# Patient Record
Sex: FEMALE | Race: WHITE | Employment: OTHER | URBAN - METROPOLITAN AREA
[De-identification: names, ages, dates, MRNs, and addresses within clinical notes are randomized per-mention and may not be internally consistent; named-entity substitution may affect disease eponyms.]

---

## 2018-11-23 ENCOUNTER — HOSPITAL ENCOUNTER (INPATIENT)
Facility: HOSPITAL | Age: 63
LOS: 11 days | Discharge: HOME | DRG: 871 | End: 2018-12-04
Attending: EMERGENCY MEDICINE | Admitting: INTERNAL MEDICINE
Payer: MEDICARE

## 2018-11-23 ENCOUNTER — APPOINTMENT (OUTPATIENT)
Dept: GENERAL RADIOLOGY | Facility: HOSPITAL | Age: 63
DRG: 871 | End: 2018-11-23
Attending: EMERGENCY MEDICINE
Payer: MEDICARE

## 2018-11-23 DIAGNOSIS — N39.0 SEPSIS DUE TO URINARY TRACT INFECTION (HCC): Primary | ICD-10-CM

## 2018-11-23 DIAGNOSIS — E87.5 HYPERKALEMIA: ICD-10-CM

## 2018-11-23 DIAGNOSIS — G93.41 ENCEPHALOPATHY IN SEPSIS: ICD-10-CM

## 2018-11-23 DIAGNOSIS — A41.9 SEPSIS DUE TO URINARY TRACT INFECTION (HCC): Primary | ICD-10-CM

## 2018-11-23 PROBLEM — E87.2 METABOLIC ACIDOSIS: Status: ACTIVE | Noted: 2018-11-23

## 2018-11-23 PROBLEM — R79.89 AZOTEMIA: Status: ACTIVE | Noted: 2018-11-23

## 2018-11-23 PROBLEM — G93.49 ENCEPHALOPATHY DUE TO INFECTION: Status: ACTIVE | Noted: 2018-11-23

## 2018-11-23 PROBLEM — D64.9 ANEMIA: Status: ACTIVE | Noted: 2018-11-23

## 2018-11-23 PROBLEM — D72.829 LEUKOCYTOSIS: Status: ACTIVE | Noted: 2018-11-23

## 2018-11-23 PROBLEM — R73.9 HYPERGLYCEMIA: Status: ACTIVE | Noted: 2018-11-23

## 2018-11-23 PROBLEM — B99.9 ENCEPHALOPATHY DUE TO INFECTION: Status: ACTIVE | Noted: 2018-11-23

## 2018-11-23 PROCEDURE — 71045 X-RAY EXAM CHEST 1 VIEW: CPT | Performed by: EMERGENCY MEDICINE

## 2018-11-23 PROCEDURE — 99291 CRITICAL CARE FIRST HOUR: CPT | Performed by: INTERNAL MEDICINE

## 2018-11-23 RX ORDER — AMMONIUM LACTATE 12 G/100G
LOTION TOPICAL AS NEEDED
Status: ON HOLD | COMMUNITY
End: 2018-12-04

## 2018-11-23 RX ORDER — HYDROCODONE BITARTRATE AND ACETAMINOPHEN 10; 325 MG/1; MG/1
1 TABLET ORAL EVERY 6 HOURS PRN
Status: ON HOLD | COMMUNITY
End: 2018-12-04

## 2018-11-23 RX ORDER — DEXTROSE MONOHYDRATE 25 G/50ML
50 INJECTION, SOLUTION INTRAVENOUS ONCE
Status: COMPLETED | OUTPATIENT
Start: 2018-11-23 | End: 2018-11-23

## 2018-11-23 RX ORDER — HYDROMORPHONE HYDROCHLORIDE 1 MG/ML
0.5 INJECTION, SOLUTION INTRAMUSCULAR; INTRAVENOUS; SUBCUTANEOUS EVERY 30 MIN PRN
Status: DISCONTINUED | OUTPATIENT
Start: 2018-11-23 | End: 2018-11-23

## 2018-11-23 RX ORDER — HEPARIN SODIUM 5000 [USP'U]/ML
5000 INJECTION, SOLUTION INTRAVENOUS; SUBCUTANEOUS EVERY 8 HOURS SCHEDULED
Status: DISCONTINUED | OUTPATIENT
Start: 2018-11-23 | End: 2018-12-04

## 2018-11-23 RX ORDER — LABETALOL 200 MG/1
200 TABLET, FILM COATED ORAL 2 TIMES DAILY
COMMUNITY

## 2018-11-23 RX ORDER — PIMECROLIMUS 10 MG/G
CREAM TOPICAL 2 TIMES DAILY
Status: ON HOLD | COMMUNITY
End: 2018-12-04

## 2018-11-23 RX ORDER — BACLOFEN 20 MG/1
20 TABLET ORAL 3 TIMES DAILY
Status: ON HOLD | COMMUNITY
End: 2018-12-04

## 2018-11-23 RX ORDER — DOCUSATE SODIUM 100 MG/1
100 CAPSULE, LIQUID FILLED ORAL 2 TIMES DAILY
COMMUNITY

## 2018-11-23 RX ORDER — CITALOPRAM 40 MG/1
40 TABLET ORAL DAILY
Status: ON HOLD | COMMUNITY
End: 2019-08-07

## 2018-11-23 RX ORDER — DEXTROSE MONOHYDRATE 25 G/50ML
50 INJECTION, SOLUTION INTRAVENOUS
Status: DISCONTINUED | OUTPATIENT
Start: 2018-11-23 | End: 2018-12-04

## 2018-11-23 RX ORDER — BISACODYL 10 MG
10 SUPPOSITORY, RECTAL RECTAL DAILY PRN
COMMUNITY

## 2018-11-23 RX ORDER — MULTIVITAMIN
TABLET ORAL
Status: ON HOLD | COMMUNITY
End: 2018-12-04

## 2018-11-23 RX ORDER — ONDANSETRON 2 MG/ML
4 INJECTION INTRAMUSCULAR; INTRAVENOUS EVERY 6 HOURS PRN
Status: DISCONTINUED | OUTPATIENT
Start: 2018-11-23 | End: 2018-12-04

## 2018-11-23 RX ORDER — ACETAMINOPHEN 325 MG/1
650 TABLET ORAL EVERY 6 HOURS PRN
COMMUNITY

## 2018-11-23 RX ORDER — NYSTATIN 10B UNIT
POWDER (EA) MISCELLANEOUS
Status: ON HOLD | COMMUNITY
End: 2018-11-24

## 2018-11-23 RX ORDER — SODIUM BICARBONATE 650 MG/1
650 TABLET ORAL 3 TIMES DAILY
Status: ON HOLD | COMMUNITY
End: 2018-12-04

## 2018-11-23 RX ORDER — LEVETIRACETAM 500 MG/1
500 TABLET ORAL 2 TIMES DAILY
Status: ON HOLD | COMMUNITY
End: 2018-12-12

## 2018-11-23 RX ORDER — POLYETHYLENE GLYCOL 3350 17 G/17G
17 POWDER, FOR SOLUTION ORAL DAILY
COMMUNITY
End: 2019-06-02 | Stop reason: ALTCHOICE

## 2018-11-23 RX ORDER — SODIUM POLYSTYRENE SULFONATE 15 G/60ML
15 SUSPENSION ORAL; RECTAL ONCE
Status: DISCONTINUED | OUTPATIENT
Start: 2018-11-23 | End: 2018-12-02

## 2018-11-23 RX ORDER — CALCIUM ACETATE 667 MG/1
1 CAPSULE ORAL 2 TIMES DAILY
Status: ON HOLD | COMMUNITY
End: 2018-12-04

## 2018-11-23 RX ORDER — SODIUM CHLORIDE 9 MG/ML
INJECTION, SOLUTION INTRAVENOUS CONTINUOUS
Status: DISCONTINUED | OUTPATIENT
Start: 2018-11-23 | End: 2018-11-25

## 2018-11-23 RX ORDER — ONDANSETRON 2 MG/ML
4 INJECTION INTRAMUSCULAR; INTRAVENOUS EVERY 4 HOURS PRN
Status: DISCONTINUED | OUTPATIENT
Start: 2018-11-23 | End: 2018-11-29

## 2018-11-23 RX ORDER — SODIUM CHLORIDE 9 MG/ML
INJECTION, SOLUTION INTRAVENOUS CONTINUOUS
Status: DISCONTINUED | OUTPATIENT
Start: 2018-11-23 | End: 2018-11-23

## 2018-11-23 RX ORDER — SODIUM POLYSTYRENE SULFONATE 15 G/60ML
15 SUSPENSION ORAL; RECTAL ONCE
Status: COMPLETED | OUTPATIENT
Start: 2018-11-23 | End: 2018-11-23

## 2018-11-23 RX ORDER — GABAPENTIN 300 MG/1
300 CAPSULE ORAL DAILY
Status: ON HOLD | COMMUNITY
End: 2018-12-04

## 2018-11-23 RX ORDER — GLIPIZIDE 5 MG/1
5 TABLET ORAL
Status: ON HOLD | COMMUNITY
End: 2019-08-07

## 2018-11-23 RX ORDER — ACETAMINOPHEN 325 MG/1
650 TABLET ORAL EVERY 6 HOURS PRN
Status: DISCONTINUED | OUTPATIENT
Start: 2018-11-23 | End: 2018-12-04

## 2018-11-23 RX ORDER — AMLODIPINE BESYLATE 5 MG/1
5 TABLET ORAL DAILY
Status: ON HOLD | COMMUNITY
End: 2018-12-04

## 2018-11-24 ENCOUNTER — APPOINTMENT (OUTPATIENT)
Dept: CT IMAGING | Facility: HOSPITAL | Age: 63
DRG: 871 | End: 2018-11-24
Attending: INTERNAL MEDICINE
Payer: MEDICARE

## 2018-11-24 ENCOUNTER — APPOINTMENT (OUTPATIENT)
Dept: ULTRASOUND IMAGING | Facility: HOSPITAL | Age: 63
DRG: 871 | End: 2018-11-24
Attending: INTERNAL MEDICINE
Payer: MEDICARE

## 2018-11-24 PROBLEM — E11.9 DIABETES (HCC): Chronic | Status: ACTIVE | Noted: 2018-11-24

## 2018-11-24 PROCEDURE — 99223 1ST HOSP IP/OBS HIGH 75: CPT | Performed by: INTERNAL MEDICINE

## 2018-11-24 PROCEDURE — 99223 1ST HOSP IP/OBS HIGH 75: CPT | Performed by: OTHER

## 2018-11-24 PROCEDURE — 99232 SBSQ HOSP IP/OBS MODERATE 35: CPT | Performed by: INTERNAL MEDICINE

## 2018-11-24 PROCEDURE — 70450 CT HEAD/BRAIN W/O DYE: CPT | Performed by: INTERNAL MEDICINE

## 2018-11-24 PROCEDURE — 99291 CRITICAL CARE FIRST HOUR: CPT | Performed by: NURSE PRACTITIONER

## 2018-11-24 RX ORDER — METOPROLOL TARTRATE 5 MG/5ML
5 INJECTION INTRAVENOUS EVERY 6 HOURS PRN
Status: DISCONTINUED | OUTPATIENT
Start: 2018-11-24 | End: 2018-12-04

## 2018-11-24 RX ORDER — NICOTINE 21 MG/24HR
1 PATCH, TRANSDERMAL 24 HOURS TRANSDERMAL DAILY
Status: DISCONTINUED | OUTPATIENT
Start: 2018-11-24 | End: 2018-12-02

## 2018-11-24 RX ORDER — NYSTATIN 100000 [USP'U]/G
POWDER TOPICAL 2 TIMES DAILY
Status: ON HOLD | COMMUNITY
End: 2018-12-04

## 2018-11-24 NOTE — PLAN OF CARE
Patient transferred from ICU in stable condition. Patient awake +alert and oriented x1; asking to go to floor 1 for a cigarette. Resting comfortably in bed. Chatman draining yellow urine. Redness noted around jerald area.  Wet cough noted; patient unable to harper

## 2018-11-24 NOTE — PLAN OF CARE
Received patient from ED. Drowsy/lethargic. Slowly improving. See flowsheet for further assessment. Taken for CT brain (-). Received on 3L NC. Weaning O2 as tolerated. SR. VSS.  NPO. Chronic greene. Wound care to see.   Bedrest.  PT and OT consulted

## 2018-11-24 NOTE — ED INITIAL ASSESSMENT (HPI)
Patient presents from [address] [place] [address] for evaluation of altered mental status. According to EMS the nursing facility stated that she is normally alert and oriented but has declined today. Fever of 102.8 for EMS.

## 2018-11-24 NOTE — H&P
[facility] H[facility]PITAL[facility]                                                               History & Physical         Homero Flores Patient Status:  Emergency    10/30/1955 ZOHRA [id number]   Location [facility] Attending Maria G Lewis reports that she has been smoking. She does not have any smokeless tobacco history on file. She reports that she does not drink alcohol or use drugs.     Allergies:    Lisinopril              UNKNOWN    Home Medications:    (Not in a hospital admission) Normal            Diagnostic Data:      Laboratory Data:   Lab Results   Component Value Date    WBC 17.6 11/23/2018    HGB 9.7 11/23/2018    HCT 30.6 11/23/2018    .0 11/23/2018    CREATSERUM 2.07 11/23/2018    BUN 50 11/23/2018     11/23/201 admission. 5. Seizure disorder–we will give IV Keppra while n.p.o.  6. Depression and anxiety  7. History of cervical disc disease with myelopathy with previous surgery  8. Hyperkalemia–status post Kayexalate. Follow BMP  9.  Acute kidney injury on chroni

## 2018-11-24 NOTE — CONSULTS
[facility]  Report of Consultation    Jayde Galeana Patient Status:  Inpatient    10/30/1955 MRN [id number]   [facility] ([place], [place]) 4SW-A Attending Socorro Selby MD   Hosp Day # 1 PCP [facility]     Reason for Consultation:  AHSAN vs CKD I Polystyrene Sulfonate (KAYEXALATE) 15 GM/60ML suspension 15 g, 15 g, Oral, Once  •  glucose (DEX4) oral liquid 15 g, 15 g, Oral, Q15 Min PRN **OR** Glucose-Vitamin C (DEX-4) 4-6 GM-MG chewable tab 4 tablet, 4 tablet, Oral, Q15 Min PRN **OR** dextrose 50 % 11/24/2018    ALB 2.3 11/23/2018    ALKPHO 127 11/23/2018    BILT 0.2 11/23/2018    TP 7.5 11/23/2018    AST 11 11/23/2018    ALT 20 11/23/2018    PTT 34.3 11/23/2018    INR 1.16 11/23/2018    PTP 15.3 11/23/2018    MG 2.0 11/24/2018    PGLU 152 11/24/2018

## 2018-11-24 NOTE — PLAN OF CARE
NEUROLOGICAL - ADULT    • Achieves stable or improved neurological status Progressing    • Absence of seizures Progressing    • Remains free of injury related to seizure activity Progressing        Received patient this am awake and alert.  Oriented to pers

## 2018-11-24 NOTE — PROGRESS NOTES
ICU  Critical Care APRN H & P    NAME: Dhara Duarte - ROOM: [address] [place] [address] - MRN: [id number] - Age: 66year old - :10/30/1955    History Of Present Illness:  Dhara Duarte is a 66year old female with PMHx significant for hypertension, diabetes, Skin: Skin color, texture, turgor normal for ethnicity, no rashes or lesions, warm and dry, refer to images- multiple areas of concern   Neurologic: CNII-XII intact, normal strength    Data this admission:  Xr Chest Ap Portable  (cpt=71045)    Result Date:

## 2018-11-24 NOTE — CONSULTS
[facility]    Report of Consultation    Abeba Citizen Patient Status:  Inpatient    10/30/1955 MRN [id number]   [facility] 3NW-A Attending Jones Quezada MD   Hosp Day # 1 [facility] [address] [facility]     Date of Admission:  2018  Da (NICODERM CQ) 14 MG/24HR 1 patch, 1 patch, Transdermal, Daily  •  lactated ringers IV bolus 2,313 mL, 30 mL/kg, Intravenous, Continuous  •  0.9%  NaCl infusion, , Intravenous, Continuous  •  Heparin Sodium (Porcine) 5000 UNIT/ML injection 5,000 Units, 5,00 Face is symmetrical.  No Drift. Pupils equally round and reactive to light. 2+ reflexes bilaterally. EOMs intact. Visual fields are full. Tongue is midline. Uvula and palate elevate symmetrically. Shrug shoulders normally bilaterally.   The rest of t stroke, and if she does not continue to further improve, an EEG. Baseline cognition is not known, and her seizure history is unclear. Will attempt to get records. I would continue her home dose Keppra.      Thank you for allowing me to participate in the ev

## 2018-11-24 NOTE — CONSULTS
Pulmonary Critical Care Consult       NAME: [address] [place] [address] Se: [id number] - MRN: [id number] - Age: 66year old - :  10/30/1955    Date of Admission: 2018  8:08 PM  Admission Diagnosis: Hyperkalemia [E87.5]  Encephalopathy in sepsis [G93.41] 11/23/2018 at Unknown time   Pimecrolimus (ELIDEL) 1 % External Cream Apply topically 2 (two) times daily. Disp:  Rfl:  11/23/2018 at Unknown time   gabapentin 300 MG Oral Cap Take 300 mg by mouth daily.    Disp:  Rfl:  11/23/2018 at Unknown time   glipiZID Alcohol use: No        Frequency: Never      Drug use: No      Sexual activity: Not on file    Other Topics      Concerns:        Not on file    Social History Narrative      Not on file    Family History   Family history unknown: Yes   patient cannot reca cooperative, no distress, appears stated age. Head:  Normocephalic, without obvious abnormality, atraumatic. Eyes:  Conjunctivae/corneas clear. Neck: Supple   Back:   Non tender   Lungs:   Clear to auscultation bilaterally.    Chest wall:  No tenderne Collection Time: 11/23/18  8:43 PM   Result Value Ref Range    Urine Color Yellow Yellow    Clarity Urine Cloudy (A) Clear    Spec Gravity 1.015 1.001 - 1.030    Glucose Urine 50  (A) Negative mg/dl    Bilirubin Urine Negative Negative    Ketones Urine Neg Immature Granulocyte % 1.0 %   RAINBOW DRAW GOLD    Collection Time: 11/23/18 11:20 PM   Result Value Ref Range    Hold Gold Auto Resulted    PROTHROMBIN TIME (PT)    Collection Time: 11/23/18 11:21 PM   Result Value Ref Range    PT 15.3 (H) 12.4 - 14.7 se Absolute 0.00 0.00 - 0.30 x10(3) uL    Basophil Absolute 0.02 0.00 - 0.10 x10(3) uL    Immature Granulocyte Absolute 0.12 0.00 - 1.00 x10(3) uL    Neutrophil % 83.9 %    Lymphocyte % 8.6 %    Monocyte % 6.7 %    Eosinophil % 0.0 %    Basophil % 0.1 %    Im

## 2018-11-24 NOTE — PROGRESS NOTES
[facility]  Progress Note     Dhara Duarte Patient Status:  Inpatient    10/30/1955 MRN [id number]   [facility] 4SW-A Attending Saida Larson MD   Hosp Day # 1 PCP [facility]     Chief Complaint: AMS    S: Patient overall Subcutaneous Q8H [address]   • piperacillin-tazobactam  3.375 g Intravenous Q8H   • levETIRAcetam  500 mg Intravenous Q12H   • Sodium Polystyrene Sulfonate  15 g Oral Once   • Insulin Aspart Pen  1-10 Units Subcutaneous TID AC and HS       ASSESSMENT / PLAN:

## 2018-11-24 NOTE — PHYSICAL THERAPY NOTE
PHYSICAL THERAPY QUICK EVALUATION - INPATIENT    Room Number: 466/466-A  Evaluation Date: 11/24/2018  Presenting Problem: Sepsis, UTI  Physician Order: PT Eval and Treat    Problem List  Principal Problem:    Sepsis due to [facility] tract infection [facility] Turning over in bed (including adjusting bedclothes, sheets and blankets)?: A Lot   -   Sitting down on and standing up from a chair with arms (e.g., wheelchair, bedside commode, etc.): Unable   -   Moving from lying on back to sitting on the side of the b services. Please re-order if a new functional limitation presents during this admission. GOALS  Patient was able to achieve the following goals . ..     Patient was able to transfer Total assist wit lift use   Patient able to ambulate on level surfaces U

## 2018-11-24 NOTE — ED NOTES
Patient had large formed and liquid bowel movement after rectal kaexelate. Brief changed and jerald-care performed.

## 2018-11-24 NOTE — ED PROVIDER NOTES
Patient Seen in: [facility] Emergency Department    History   Patient presents with:  Fever (infectious)  Altered Mental Status (neurologic)    Stated Complaint: fever, ams    HPI    51-year-old female presents to the emergency department via EMS du conjunctival pallor: Neck is supple without tenderness on palpation. Head is atraumatic normocephalic. Oral mucosa is dry. Tongue is midline. No posterior pharyngeal lesions. Lungs: Rales are noted in the lung bases bilaterally.   Rhonchi are also no panel order CBC WITH DIFFERENTIAL WITH PLATELET.   Procedure                               Abnormality         Status                     ---------                               -----------         ------                     CBC W/ DIFFERENTIAL[[id number]] PM          Disposition and Plan     Clinical Impression:  Sepsis due to urinary tract infection ([facility]  (primary encounter diagnosis)  Hyperkalemia  Encephalopathy in sepsis    Disposition:  Admit  11/23/2018  9:58 pm    Follow-up:  No follow-up provider s

## 2018-11-25 ENCOUNTER — APPOINTMENT (OUTPATIENT)
Dept: ULTRASOUND IMAGING | Facility: HOSPITAL | Age: 63
DRG: 871 | End: 2018-11-25
Attending: INTERNAL MEDICINE
Payer: MEDICARE

## 2018-11-25 ENCOUNTER — APPOINTMENT (OUTPATIENT)
Dept: CT IMAGING | Facility: HOSPITAL | Age: 63
DRG: 871 | End: 2018-11-25
Attending: Other
Payer: MEDICARE

## 2018-11-25 PROCEDURE — 99232 SBSQ HOSP IP/OBS MODERATE 35: CPT | Performed by: INTERNAL MEDICINE

## 2018-11-25 PROCEDURE — 70450 CT HEAD/BRAIN W/O DYE: CPT | Performed by: OTHER

## 2018-11-25 PROCEDURE — 76770 US EXAM ABDO BACK WALL COMP: CPT | Performed by: INTERNAL MEDICINE

## 2018-11-25 RX ORDER — HYDRALAZINE HYDROCHLORIDE 20 MG/ML
10 INJECTION INTRAMUSCULAR; INTRAVENOUS
Status: DISCONTINUED | OUTPATIENT
Start: 2018-11-25 | End: 2018-12-04

## 2018-11-25 NOTE — CONSULTS
[facility]    PATIENT'S NAME: Hector Obrien   ATTENDING PHYSICIAN: RAJESH Bautista: Neyda Jin M.D.    PATIENT ACCOUNT#:   [de-identified]    LOCATION:  [address] [place] [address] [facility]  MEDICAL RECORD #:   [id number]       DATE OF MURIEL axilla being basically not involved. However, the classic pock shira signs of hidradenitis are definitely present in the groins, and there are many scarred areas and hypertrophic tissue as well. I cannot rule out that there is infection there.       Alexey Greco

## 2018-11-25 NOTE — PROGRESS NOTES
[id number] [facility] Neurology Progress Note    Abeba Citisandie Patient Status:  Inpatient    10/30/1955 MRN [id number]   [facility] 3NW-A Attending Jones Quezada MD   Hosp Day # 2 PCP [facility]         Subjective:  Alistair Dorado Imaging/Diagnostic:    • insulin detemir  10 Units Subcutaneous Nightly   • nicotine  1 patch Transdermal Daily   • nystatin-triamcinolone   Topical BID   • Heparin Sodium (Porcine)  5,000 Units Subcutaneous Q8H [address]   • piperacillin-tazobactam  3.375 g (36.7 °C) (Oral)   Resp 16   Ht 65\"   Wt 178 lb 5.6 oz   SpO2 99%   BMI 29.68 kg/m²   Neuro exam pertinent for oriented to person and hospital and year, but still tangential, not able to answer questions about her history, EOMI, face symmetric, LUE 4/5, R

## 2018-11-25 NOTE — PROGRESS NOTES
[facility]  Nephrology Progress Note    Mohini Askewrhona Patient Status:  Inpatient    10/30/1955 MRN [id number]   [facility] 3NW-A Attending Loree Carrillo MD   Hosp Day # 2 PCP [facility]       SUBJECTIVE:  No acute events noted Current Facility-Administered Medications:  hydrALAzine HCl (APRESOLINE) injection 10 mg 10 mg Intravenous Q3H PRN   metoprolol Tartrate (LOPRESSOR) 5 MG/5ML injection 5 mg 5 mg Intravenous Q6H PRN   insulin detemir (LEVEMIR) 100 UNIT/ML flextouch 10 U with mild incr creat on admit. Remains stable today     #3. AMS/fever- appears to have mult infections incl UTI and cellulitis with pseudomonas bacteremia. Cont abx per ID.     #4. AMS- remains somewhat confused.   Neuro eval ongoing          Rajan Esqueda

## 2018-11-25 NOTE — H&P
Id consult  #[id number]  Probably uti with bacteremia, gnr  It is possible there is sepsis related to her extensive hidradenitis process.

## 2018-11-25 NOTE — PROGRESS NOTES
[facility]  Progress Note     Jayde Galeana Patient Status:  Inpatient    10/30/1955 MRN [id number]   [facility] 3NW-A Attending Socorro Selby MD   Hosp Day # 2 PCP [facility]     Chief Complaint: sepsis   S: Patient with n Nightly   • nicotine  1 patch Transdermal Daily   • nystatin-triamcinolone   Topical BID   • Heparin Sodium (Porcine)  5,000 Units Subcutaneous Q8H [address]   • piperacillin-tazobactam  3.375 g Intravenous Q8H   • levETIRAcetam  500 mg Intravenous Q12H   • Sodi

## 2018-11-26 ENCOUNTER — APPOINTMENT (OUTPATIENT)
Dept: CT IMAGING | Facility: HOSPITAL | Age: 63
DRG: 871 | End: 2018-11-26
Attending: INTERNAL MEDICINE
Payer: MEDICARE

## 2018-11-26 ENCOUNTER — APPOINTMENT (OUTPATIENT)
Dept: CV DIAGNOSTICS | Facility: HOSPITAL | Age: 63
DRG: 871 | End: 2018-11-26
Attending: INTERNAL MEDICINE
Payer: MEDICARE

## 2018-11-26 PROCEDURE — 93306 TTE W/DOPPLER COMPLETE: CPT | Performed by: INTERNAL MEDICINE

## 2018-11-26 PROCEDURE — 90792 PSYCH DIAG EVAL W/MED SRVCS: CPT | Performed by: OTHER

## 2018-11-26 PROCEDURE — 99232 SBSQ HOSP IP/OBS MODERATE 35: CPT | Performed by: INTERNAL MEDICINE

## 2018-11-26 PROCEDURE — 74176 CT ABD & PELVIS W/O CONTRAST: CPT | Performed by: INTERNAL MEDICINE

## 2018-11-26 PROCEDURE — 99233 SBSQ HOSP IP/OBS HIGH 50: CPT | Performed by: OTHER

## 2018-11-26 RX ORDER — LABETALOL 200 MG/1
200 TABLET, FILM COATED ORAL
Status: DISCONTINUED | OUTPATIENT
Start: 2018-11-26 | End: 2018-12-04

## 2018-11-26 RX ORDER — AMLODIPINE BESYLATE 5 MG/1
5 TABLET ORAL DAILY
Status: DISCONTINUED | OUTPATIENT
Start: 2018-11-26 | End: 2018-12-03

## 2018-11-26 NOTE — CONSULTS
[facility]  Report of Psychiatric Consultation    Lucia Bernal Patient Status:  Inpatient    10/30/1955 MRN [id number]   [facility] 3NW-A Attending Ja Penny MD   Hosp Day # 3 PCP [facility]     Date of Admission:  65 yo WF with recurrent major depressive disorder and cervical myelopathy was admitted on 11/23/18 for eval of her fever and lethargy. She was found to have sepsis with pseudomonas (positive blood cultures) from a suspected UTI.  Her sensorium has improved Lisinopril              UNKNOWN    Medications:    Current Facility-Administered Medications:   •  AmLODIPine Besylate (NORVASC) tab 5 mg, 5 mg, Oral, Daily  •  Labetalol HCl (NORMODYNE) tab 200 mg, 200 mg, Oral, 2x Daily(Beta Blocker)  •  Cefepime HCl (MA Suicidal ideation: no suicidal ideation  Homicidal ideation: None noted    Objective       11/26/18  1537   BP: (!) 171/61   Pulse: 78   Resp: 18   Temp: 97.9 °F (36.6 °C)     Appearance: fair grooming  Behavior: normal psychomotor  Attitude: guarded    Sp

## 2018-11-26 NOTE — CM/SW NOTE
11/26/18 1100   CM/SW Screening   Referral [phone] Merged with [facility] staff; Chart review   Patient's Mental Status Alert;Oriented   Patient's [address] Name [facility]   Discharge Needs   Antici

## 2018-11-26 NOTE — OCCUPATIONAL THERAPY NOTE
Attempted to see pt for OT. Per RN, pt occupied with ECHO. Will re-attempt to see pt as appropriate and as able.

## 2018-11-26 NOTE — PLAN OF CARE
SKIN/TISSUE INTEGRITY - ADULT    • Incision(s), wounds(s) or drain site(s) healing without S/S of infection Not Progressing          CARDIOVASCULAR - ADULT    • Maintains optimal cardiac output and hemodynamic stability Progressing        Delirium    • Min

## 2018-11-26 NOTE — PROGRESS NOTES
[facility]  Progress Note     Mohini Garrett Patient Status:  Inpatient    10/30/1955 MRN [id number]   [facility] 3NW-A Attending Loree Carrillo MD   Hosp Day # 3 PCP [facility]     Chief Complaint: \" I am upset\"    S: Garth Brace results for input(s): TROP, CK in the last 168 hours. Imaging: Imaging data reviewed in Epic.     Medications:   • AmLODIPine Besylate  5 mg Oral Daily   • Labetalol HCl  200 mg Oral 2x Daily(Beta Blocker)   • nicotine  1 patch Transdermal Daily   • impairment and claims that she is pretty with it but forgetful at times. I was not able to get more information from Mr Jorge Rodriguez.        Melanie Stephens MD

## 2018-11-26 NOTE — PROGRESS NOTES
[address] [facility] Neurology Progress Note    Homero Flores Patient Status:  Inpatient    10/30/1955 MRN [id number]   [facility] [place] [facility] 3NW-A Attending Gustavo Roche MD   Hosp Day # 3 PCP [facility]         Subjective:  Katherine Verma Keppra   Encephalopathy, improving likely TME due to infection and  multiple metabolic abnormalities  Memory impairment that is ?baseline vs encephalopathy    Plan:  Continue Keppra 500 mg IV BID (home dose)  Seizure precautions   Abx per ID  Correct under

## 2018-11-26 NOTE — PROGRESS NOTES
[facility]  Nephrology Progress Note    Karen Pro Patient Status:  Inpatient    10/30/1955 MRN [id number]   [facility] 3NW-A Attending Kalin Quick MD   Hosp Day # 3 PCP [facility]       SUBJECTIVE:  No acute events noted 11/26/18   1021   [facility]  69  78  96  149*  134*       Meds:     Current Facility-Administered Medications:   AmLODIPine Besylate (NORVASC) tab 5 mg 5 mg Oral Daily   Labetalol HCl (NORMODYNE) tab 200 mg 200 mg Oral 2x Daily(Beta Blocker)   hydrALAzine HCl (A admit. Remains stable today     #3. AMS/fever- appears to have mult infections incl UTI and cellulitis with pseudomonas bacteremia. Cont abx per ID.     #4. AMS- remains somewhat confused.   Neuro eval ongoing        Della Benitez MD  11/26/2018  1

## 2018-11-26 NOTE — PROGRESS NOTES
[facility]  TEL: [phone]  FAX: [phone]    Mohini Askewrhona Patient Status:  Inpatient    10/30/1955 MRN [id number]   [facility] [place] 3NW-A Attending Loree Carrillo MD   Hosp Day # 3 PCP [facility] 19   --    BILT  0.2   --   0.2   --    TP  7.5   --   6.0*   --        Microbiology    Reviewed in EMR,     Radiology: Ct Brain Or Head (38981)    Result Date: 11/25/2018  PROCEDURE:  CT BRAIN OR HEAD (92791)  COMPARISON:  [facility] , CT BRAIN OR HEAD (71506 PATIENT STATED HISTORY: (As transcribed by Technologist)  admitted to icu from nursing home for altered mental status and fever    FINDINGS:  VENTRICLES/SULCI:  Ventricles and sulci are prominent in size consistent with volume loss.    INTRACRANIAL:  There echogenicity posteriorly measuring 1.4 x 1.4 cm consistent with a Bosniak type 2 cyst.  BLADDER:  The Chatman catheter decompresses the bladder. OTHER:  Negative. CONCLUSION:  1. No evidence of obstructive uropathy. Benign bilateral cysts seen.   The b

## 2018-11-26 NOTE — PROGRESS NOTES
PSYCH CONSULT    Date of Admission: 11/23/18  Date of Consult: 11/26/18  Reason for Consultation: Altered mental status    Impression:  Primary Psychiatric Diagnosis:  Acute delirium from suspected urosepsis--- improved     Major depressive disorder, recur

## 2018-11-26 NOTE — PROGRESS NOTES
11/26/18 9833   Clinical Encounter Type   Visited With Health care provider;Patient and family together   Patient's Supportive Strategies/Resources Patient finds spiritual/community support within [employer].     Referral To (Patient signed HPOA form/POA M

## 2018-11-26 NOTE — PLAN OF CARE
CARDIOVASCULAR - ADULT    • Maintains optimal cardiac output and hemodynamic stability Not Progressing        Delirium    • Minimize duration of delirium Not Progressing        Diabetes/Glucose Control    • Glucose maintained within prescribed range Not Pr regarding sustained high BP. Dr. Zbigniew Coy restarted pt's home BP meds. Gave dose of Norvasc. Will continue to monitor BP.    0530 Blood sugar 69, initiation of hypoglycemia protocol and given 1st dose of 15 g dextrose. .  Second blood sugar 78, provided leanne

## 2018-11-26 NOTE — PROCEDURES
[facility] in [place]  in affiliation with [facility]  [address]  [address]  [phone]  Fax [phone]    Name: Mort Plate  10/30/1955  Date of Rfl:    bisacodyl (DULCOLAX) 10 MG Rectal Suppos Place 10 mg rectally daily. Disp:  Rfl:    HYDROcodone-acetaminophen  MG Oral Tab Take 1 tablet by mouth every 6 (six) hours as needed for Pain.  Disp:  Rfl:    ammonium lactate 12 % External Lotion Della

## 2018-11-27 ENCOUNTER — APPOINTMENT (OUTPATIENT)
Dept: MRI IMAGING | Facility: HOSPITAL | Age: 63
DRG: 871 | End: 2018-11-27
Attending: INTERNAL MEDICINE
Payer: MEDICARE

## 2018-11-27 PROCEDURE — 99232 SBSQ HOSP IP/OBS MODERATE 35: CPT | Performed by: INTERNAL MEDICINE

## 2018-11-27 PROCEDURE — 99232 SBSQ HOSP IP/OBS MODERATE 35: CPT | Performed by: OTHER

## 2018-11-27 PROCEDURE — 99233 SBSQ HOSP IP/OBS HIGH 50: CPT | Performed by: OTHER

## 2018-11-27 PROCEDURE — 70551 MRI BRAIN STEM W/O DYE: CPT | Performed by: INTERNAL MEDICINE

## 2018-11-27 RX ORDER — SODIUM CHLORIDE 9 MG/ML
INJECTION, SOLUTION INTRAVENOUS CONTINUOUS
Status: CANCELLED | OUTPATIENT
Start: 2018-11-27

## 2018-11-27 RX ORDER — SENNA AND DOCUSATE SODIUM 50; 8.6 MG/1; MG/1
2 TABLET, FILM COATED ORAL DAILY
Status: DISCONTINUED | OUTPATIENT
Start: 2018-11-27 | End: 2018-11-27

## 2018-11-27 RX ORDER — POLYVINYL ALCOHOL 14 MG/ML
1 SOLUTION/ DROPS OPHTHALMIC 4 TIMES DAILY PRN
Status: DISCONTINUED | OUTPATIENT
Start: 2018-11-27 | End: 2018-12-04

## 2018-11-27 RX ORDER — POLYETHYLENE GLYCOL 3350 17 G/17G
17 POWDER, FOR SOLUTION ORAL DAILY
Status: DISCONTINUED | OUTPATIENT
Start: 2018-11-27 | End: 2018-11-27

## 2018-11-27 NOTE — CONSULTS
[facility] Cardiology Consultation    Galileo Shepherd Patient Status:  Inpatient    10/30/1955 MRN [id number]   [facility] 3NW-A Attending Marleni Gonzáles MD   Hosp Day # 4 PCP [facility]     Reason for Consultation:  Abnormal echo, r/o endoc (159-183)/(58-69) 166/58    Last 3 Weights  11/23/18 2300 : 178 lb 5.6 oz (80.9 kg)  11/23/18 2049 : 170 lb (77.1 kg)          General: No apparent distress. Unkempt. HEENT: No focal deficits. Neck: supple.  JVP normal  Cardiac: Regular rhythm, S1, S2 no

## 2018-11-27 NOTE — PLAN OF CARE
METABOLIC/FLUID AND ELECTROLYTES - ADULT    • Glucose maintained within prescribed range Adequate for Discharge        RESPIRATORY - ADULT    • Achieves optimal ventilation and oxygenation Adequate for Discharge          RESPIRATORY - ADULT    • Achieves o

## 2018-11-27 NOTE — PLAN OF CARE
CARDIOVASCULAR - ADULT    • Maintains optimal cardiac output and hemodynamic stability Progressing        Delirium    • Minimize duration of delirium Progressing        Diabetes/Glucose Control    • Glucose maintained within prescribed range Progressing

## 2018-11-27 NOTE — CONSULTS
[facility]  Report of Inpatient Wound Care Consultation     Sky Monahan Patient Status:  Inpatient    10/30/1955 MRN [id number]   [facility] 3NW-A Attending Miguel Angel Egan MD   Hosp Day # 4 PCP [facility]  40*   --   34*   --    --    --   32*   --    GLU  203*   --    --   153*   --   67*   --   64*   --    --    --   133*   --    CA  8.8   --    --   8.1*   --   8.3   --   8.3   --    --    --   8.4   --    ALB  2.3*   --    --    --    --   1.7*   --    - #[phone] if you have any questions about this consultation and plan of care. If unable to reach me at these, please call the Inpatient Wound Care pager at #[phone]. Time Spent 30 Minutes. Thank you,    Guerda Sanders.  Constantino Caceres, PT, MPT  [address]

## 2018-11-27 NOTE — OCCUPATIONAL THERAPY NOTE
This writer called MAYA Kelly at [employer] stated that pt uses juany lift at baseline and total assist for all ADLs, pt with no skilled needs at this time, OT will sign off at this time, D/C from OT services.

## 2018-11-27 NOTE — PLAN OF CARE
CARDIOVASCULAR - ADULT    • Maintains optimal cardiac output and hemodynamic stability Progressing        Delirium    • Minimize duration of delirium Progressing        Diabetes/Glucose Control    • Glucose maintained within prescribed range Progressing abdominal folds. Some redness/ scaly areas note abdominal folds, under breast area, bilateral axillary, and jerald area. PT to work with patient. Electrolyte protocol; no replacement needed at this time. Plan of care addressed; pt verbalizes understanding.  A

## 2018-11-27 NOTE — PROGRESS NOTES
[address] [place] [address]  TEL: [phone]  FAX: [phone]    [facility] Patient Status:  Inpatient    10/30/1955 MRN [id number]   [facility] 3NW-A Attending Sharyn Howard MD   Hosp Day # 4 PCP [facility] ALT  20   --   19   --    --    BILT  0.2   --   0.2   --    --    TP  7.5   --   6.0*   --    --     < > = values in this interval not displayed.        Microbiology    Reviewed in EMR,   Blood Culture FREQ X 2 [[id number]] (Abnormal) Collected: 11/23/18 20 Ciprofloxacin <=1  Sensitive    Gentamicin 2  Sensitive    Levofloxacin <=0.25  Sensitive    Meropenem <=1  Sensitive    Piperacillin + Tazobactam <=4  Sensitive           Linear View         MRSA Culture Only Once [[id number]] Collected: 11/23/18 7205   Or

## 2018-11-27 NOTE — PROGRESS NOTES
[facility]  Nephrology Progress Note    Maria Dolores Paulson Patient Status:  Inpatient    10/30/1955 MRN [id number]   [facility] 3NW-A Attending Shama Best MD   Hosp Day # 4 PCP [facility]       SUBJECTIVE:  No acute events noted Labs   Lab  11/26/18   1212  11/26/18   1439  11/26/18   1653  11/26/18   2221  11/27/18   0705   PGLU  126*  106*  106*  135*  111*       Meds:     Current Facility-Administered Medications:  magnesium hydroxide (MILK OF MAGNESIA) 400 MG/5ML suspension 30 Impression/Plan:      #1. CKD III- b/l creat from earlier this year was close to 1.9 mg/dl and is likely due to DM/HTN. Urine microalbumin/creat ordered but not done and will re-order. U/s unremarkable.   Will also check monoclonal protein study as

## 2018-11-27 NOTE — PROGRESS NOTES
[facility]  Progress Note     Lawrance Shy Patient Status:  Inpatient    10/30/1955 MRN [id number]   [facility] 3NW-A Attending Michael Sam MD   Hosp Day # 4 PCP [facility]     Chief Complaint: confused    S: Patient adelso 2321   PTP  15.3*   INR  1.16*       No results for input(s): TROP, CK in the last 168 hours. Imaging: Imaging data reviewed in Epic.     Medications:   • AmLODIPine Besylate  5 mg Oral Daily   • Labetalol HCl  200 mg Oral 2x Daily(Beta Blocker)   •

## 2018-11-27 NOTE — PROGRESS NOTES
[facility]  Report of Psychiatric Progress Note    Nelly Iglesias Patient Status:  Inpatient    10/30/1955 MRN [id number]   [facility] 3NW-A Attending Keyon Loya MD   Hosp Day # 4 [place] [id number] [facility]     Date of Admission:  67 yo WF with recurrent major depressive disorder and cervical myelopathy was admitted on 11/23/18 for eval of her fever and lethargy. She was found to have sepsis with pseudomonas (positive blood cultures) from a suspected UTI.  Her sensorium has improved • HC INCISION AND DRAINAGE PERIRECTAL ABSCESS       Family History   Family history unknown: Yes      reports that she has been smoking. She does not have any smokeless tobacco history on file. She reports that she does not drink alcohol or use drugs. •  glucose (DEX4) oral liquid 15 g, 15 g, Oral, Q15 Min PRN **OR** Glucose-Vitamin C (DEX-4) 4-6 GM-MG chewable tab 4 tablet, 4 tablet, Oral, Q15 Min PRN **OR** dextrose 50 % injection 50 mL, 50 mL, Intravenous, Q15 Min PRN **OR** glucose (DEX4) oral liqui

## 2018-11-28 ENCOUNTER — APPOINTMENT (OUTPATIENT)
Dept: CV DIAGNOSTICS | Facility: HOSPITAL | Age: 63
DRG: 871 | End: 2018-11-28
Attending: INTERNAL MEDICINE
Payer: MEDICARE

## 2018-11-28 PROCEDURE — 99232 SBSQ HOSP IP/OBS MODERATE 35: CPT | Performed by: HOSPITALIST

## 2018-11-28 PROCEDURE — 99232 SBSQ HOSP IP/OBS MODERATE 35: CPT | Performed by: OTHER

## 2018-11-28 PROCEDURE — 99233 SBSQ HOSP IP/OBS HIGH 50: CPT | Performed by: OTHER

## 2018-11-28 PROCEDURE — B246ZZ4 ULTRASONOGRAPHY OF RIGHT AND LEFT HEART, TRANSESOPHAGEAL: ICD-10-PCS | Performed by: INTERNAL MEDICINE

## 2018-11-28 PROCEDURE — 93320 DOPPLER ECHO COMPLETE: CPT | Performed by: INTERNAL MEDICINE

## 2018-11-28 PROCEDURE — 99232 SBSQ HOSP IP/OBS MODERATE 35: CPT | Performed by: INTERNAL MEDICINE

## 2018-11-28 PROCEDURE — 93325 DOPPLER ECHO COLOR FLOW MAPG: CPT | Performed by: INTERNAL MEDICINE

## 2018-11-28 RX ORDER — SODIUM CHLORIDE 9 MG/ML
INJECTION, SOLUTION INTRAVENOUS CONTINUOUS
Status: DISCONTINUED | OUTPATIENT
Start: 2018-11-28 | End: 2018-12-04

## 2018-11-28 RX ORDER — MIDAZOLAM HYDROCHLORIDE 1 MG/ML
1 INJECTION INTRAMUSCULAR; INTRAVENOUS ONCE AS NEEDED
Status: ACTIVE | OUTPATIENT
Start: 2018-11-28 | End: 2018-11-28

## 2018-11-28 RX ORDER — POTASSIUM CHLORIDE 20 MEQ/1
40 TABLET, EXTENDED RELEASE ORAL ONCE
Status: COMPLETED | OUTPATIENT
Start: 2018-11-28 | End: 2018-11-28

## 2018-11-28 RX ORDER — MIDAZOLAM HYDROCHLORIDE 1 MG/ML
1 INJECTION INTRAMUSCULAR; INTRAVENOUS ONCE AS NEEDED
Status: COMPLETED | OUTPATIENT
Start: 2018-11-28 | End: 2018-11-28

## 2018-11-28 RX ORDER — QUETIAPINE 25 MG/1
12.5 TABLET, FILM COATED ORAL NIGHTLY PRN
Status: DISCONTINUED | OUTPATIENT
Start: 2018-11-28 | End: 2018-11-29

## 2018-11-28 RX ORDER — SODIUM CHLORIDE 9 MG/ML
INJECTION, SOLUTION INTRAVENOUS CONTINUOUS
Status: DISCONTINUED | OUTPATIENT
Start: 2018-11-28 | End: 2018-12-02

## 2018-11-28 NOTE — PROGRESS NOTES
[address] [facility]  TEL: [phone]  FAX: [phone]    Nelly Iglesias Patient Status:  Inpatient    10/30/1955 MRN [id number]   [facility] 3NW-A Attending Keyon Loya MD   Hosp Day # 5 PCP [facility] < >  22.0  19.0*  19.0*  19.0*   ALKPHO  127   --   89   --    --    --    AST  11*   --   9*   --    --    --    ALT  20   --   19   --    --    --    BILT  0.2   --   0.2   --    --    --    TP  7.5   --   6.0*   --    --    --     < > = values in this i

## 2018-11-28 NOTE — PROGRESS NOTES
[id number] [address] Neurology Progress Note    Anuj Payor Patient Status:  Inpatient    10/30/1955 MRN [id number]   [facility] 3NW-A Attending Veronica Leon MD   Hosp Day # 5 PCP [facility]         Subjective:  Colleen Green recommended.     • AmLODIPine Besylate  5 mg Oral Daily   • Labetalol HCl  200 mg Oral 2x Daily(Beta Blocker)   • cefepime  2 g Intravenous Q12H   • escitalopram  15 mg Oral Daily   • nicotine  1 patch Transdermal Daily   • nystatin-triamcinolone   Topical notes reviewed and patient independently seen and examined as well. Subjective:  Mohini Gauthier is a(n) 66year old female who is being followed for:   Confusional state    Has her moments of confusion and is aware partially when she gets these.   C

## 2018-11-28 NOTE — PROGRESS NOTES
JULIAN at bedside with Dr Yoandy Del Valle. Pt tolerated procedure well. See bedside sedation record. 09:20: Report called to [facility]. Pt transported to [facility] in stable condition without c/o.

## 2018-11-28 NOTE — PROGRESS NOTES
[facility]  Nephrology Progress Note    Dorota Murillo Patient Status:  Inpatient    10/30/1955 MRN [id number]   [facility] 3NW-A Attending Kelsey Acosta MD   Hosp Day # 5 PCP [facility]       SUBJECTIVE:  No complaints today Labs   Lab  11/27/18   1123  11/27/18   1720  11/27/18   2137  11/28/18   0726  11/28/18   1109   PGLU  173*  165*  197*  135*  143*       Meds:     Current Facility-Administered Medications:  0.9%  NaCl infusion  Intravenous Continuous   Midazolam HCl (VE (DEX-4) 4-6 GM-MG chewable tab 8 tablet 8 tablet Oral Q15 Min PRN   Insulin Aspart Pen (NOVOLOG) 100 UNIT/ML flexpen 1-10 Units 1-10 Units Subcutaneous TID AC and HS   ondansetron HCl (ZOFRAN) injection 4 mg 4 mg Intravenous Q4H PRN         Impression/Plan

## 2018-11-28 NOTE — PLAN OF CARE
CARDIOVASCULAR - ADULT    • Maintains optimal cardiac output and hemodynamic stability Progressing        Delirium    • Minimize duration of delirium Progressing        Diabetes/Glucose Control    • Glucose maintained within prescribed range Progressing Hx of hydroadenitis; mepilex dressing to left gluteal area and lower posterior thigh with minimal serosanguinous drainage noted; mepilex changed. Nicotine patch to right arm placed. Nystatin cream to abdominal folds.  Some redness/ scaly areas note abdomina

## 2018-11-28 NOTE — PROCEDURES
Suzanne [address] [place] [address] Cardiology  Transesophageal Echocardiogram Report    PREOPERATIVE DIAGNOSIS:   bacteremia    POSTOPERATIVE DIAGNOSIS:  same as above    PROCEDURE PERFORMED: Transesophageal echocardiogram with Doppler     TECHNIQUE: The risks, benefits It was then slowly withdrawn orally. No complications were noted at the end of the procedure. FINDINGS:    2D echocardiography:  The left ventricle appears normal in size, thickness, with low normal systolic function.  The estimated left ventricular eje

## 2018-11-28 NOTE — PROGRESS NOTES
[facility] [address] Cardiology Progress Note        Adair Pineda Patient Status:  Inpatient    10/30/1955 MRN [id number]   [facility] 3NW-A Attending Mp Galeana MD   Hosp Day # 5 PCP [facility]     Subjective:  No 5. 3*  4.5  4.3  3.9  3.7   CL  114*  113*  112*  111  113*   CO2  21.0*  22.0  19.0*  19.0*  19.0*   BUN  49*  40*  34*  32*  32*   CREATSERUM  2.03*  1.84*  1.76*  1.88*  1.82*   CA  8.1*  8.3  8.3  8.4  8.5   MG  2.0   --    --    --    --    GLU  153*

## 2018-11-28 NOTE — PROGRESS NOTES
[facility]  Progress Note     Lawrance Shy Patient Status:  Inpatient    10/30/1955 MRN [id number]   [facility] 3NW-A Attending Michael Sam MD   Hosp Day # 5 PCP [facility]     Chief Complaint: confused    S: Patient has displayed. Estimated Creatinine Clearance: 29.2 mL/min (A) (based on SCr of 1.82 mg/dL (H)). Recent Labs   Lab  11/23/18   2321   PTP  15.3*   INR  1.16*       No results for input(s): TROP, CK in the last 168 hours.          Imaging: Imaging data

## 2018-11-29 PROCEDURE — 99232 SBSQ HOSP IP/OBS MODERATE 35: CPT | Performed by: OTHER

## 2018-11-29 PROCEDURE — 99232 SBSQ HOSP IP/OBS MODERATE 35: CPT | Performed by: INTERNAL MEDICINE

## 2018-11-29 PROCEDURE — 99233 SBSQ HOSP IP/OBS HIGH 50: CPT | Performed by: HOSPITALIST

## 2018-11-29 RX ORDER — QUETIAPINE 25 MG/1
25 TABLET, FILM COATED ORAL NIGHTLY
Status: DISCONTINUED | OUTPATIENT
Start: 2018-11-29 | End: 2018-12-04

## 2018-11-29 RX ORDER — HALOPERIDOL 5 MG/ML
1 INJECTION INTRAMUSCULAR 2 TIMES DAILY PRN
Status: DISCONTINUED | OUTPATIENT
Start: 2018-11-29 | End: 2018-11-30

## 2018-11-29 RX ORDER — HALOPERIDOL 5 MG/ML
0.5 INJECTION INTRAMUSCULAR 2 TIMES DAILY PRN
Status: DISCONTINUED | OUTPATIENT
Start: 2018-11-29 | End: 2018-11-29

## 2018-11-29 NOTE — PLAN OF CARE
Delirium    • Minimize duration of delirium Not Progressing        NEUROLOGICAL - ADULT    • Achieves stable or improved neurological status Not Progressing          CARDIOVASCULAR - ADULT    • Maintains optimal cardiac output and hemodynamic stability Pro precautions in place. Dressing changes prn to hidradentitis wounds. Mepilex dressings in place. Wound care following. VSS. PRN BP medications. POC discussed. Call light within reach. Will continue to monitor.

## 2018-11-29 NOTE — PROGRESS NOTES
[facility]  Report of Psychiatric Progress Note    Evon Ho Patient Status:  Inpatient    10/30/1955 MRN [id number]   [facility] 3NW-A Attending Norman Dela Cruz MD   Hosp Day # 5 [place] [address] [facility]     Date of Admission: / 65 yo WF with recurrent major depressive disorder and cervical myelopathy was admitted on 11/23/18 for eval of her fever and lethargy. She was found to have sepsis with pseudomonas (positive blood cultures) from a suspected UTI.  Her sensorium has improved • Diabetes [facility])    • Essential hypertension    • Seizure disorder [facility])      Past Surgical History:   Procedure Laterality Date   • BACK SURGERY      c5-c7 herniated disc   • HC INCISION AND DRAINAGE PERIRECTAL ABSCESS       Family History   Family histor •  levETIRAcetam (KEPPRA) 500 mg in sodium chloride 0.9 % 100 mL IVPB, 500 mg, Intravenous, Q12H  •  Sodium Polystyrene Sulfonate (KAYEXALATE) 15 GM/60ML suspension 15 g, 15 g, Oral, Once  •  glucose (DEX4) oral liquid 15 g, 15 g, Oral, Q15 Min PRN **OR** PGLU 217 11/28/2018       STUDIES:    This was on 11/26/18. I think her score will improve if tested a few days from now.

## 2018-11-29 NOTE — PROGRESS NOTES
[address] [facility]  TEL: [phone]  FAX: [phone]    Kivalina Section Patient Status:  Inpatient    10/30/1955 MRN [id number]   [facility] 3NW-A Attending Chang Castellon MD   Hosp Day # 6 PCP [facility] 25*   < >  29*  31*  28*  30*   --    CA  8.8   < >  8.3  8.3  8.4  8.5   --    ALB  2.3*   --   1.7*   --    --    --    --    NA  139   < >  141  141  141  142   --    K  6.0*   < >  4.5  4.3  3.9  3.7  4.2   CL  110   < >  113*  112*  111  113*   --

## 2018-11-29 NOTE — PROGRESS NOTES
[facility]  Progress Note     Declan Martines Patient Status:  Inpatient    10/30/1955 MRN [id number]   [facility] 3NW-A Attending Dedra Barry MD   Hosp Day # 6 PCP [facility]     Chief Complaint: confused    S: Patient has --    --    BILT  0.2   --   0.2   --    --    --    --    TP  7.5   --   6.0*   --    --    --    --     < > = values in this interval not displayed. Estimated Creatinine Clearance: 29.2 mL/min (A) (based on SCr of 1.82 mg/dL (H)).     Recent Labs

## 2018-11-29 NOTE — PROGRESS NOTES
[facility]  Nephrology Progress Note    Martha Blood Patient Status:  Inpatient    10/30/1955 MRN [id number]   [facility] 3NW-A Attending Mela Desai MD   Hosp Day # 6 PCP [facility]       SUBJECTIVE:  Has been hallucinatin 11/23/18   2043  11/25/18   0540   ALT  20  19   AST  11*  9*   ALB  2.3*  1.7*       Recent Labs   Lab  11/28/18   0726  11/28/18   1109  11/28/18   1512  11/28/18 2107  11/29/18   0749   PGLU  135*  143*  217*  165*  253*       Meds:     Current Facili glucose (DEX4) oral liquid 30 g 30 g Oral Q15 Min PRN   Or      Glucose-Vitamin C (DEX-4) 4-6 GM-MG chewable tab 8 tablet 8 tablet Oral Q15 Min PRN   Insulin Aspart Pen (NOVOLOG) 100 UNIT/ML flexpen 1-10 Units 1-10 Units Subcutaneous TID AC and HS   onda

## 2018-11-29 NOTE — PROGRESS NOTES
[address] [address] Neurology Progress Note    Evon Ho Patient Status:  Inpatient    10/30/1955 MRN [id number]   [facility] 3NW-A Attending Pernell Castleman, MD   Hosp Day # 6 PCP [facility]         Subjective:  Jesse Yeager (Porcine)  5,000 Units Subcutaneous Q8H [address]   • levETIRAcetam  500 mg Intravenous Q12H   • Sodium Polystyrene Sulfonate  15 g Oral Once   • Insulin Aspart Pen  1-10 Units Subcutaneous TID AC and HS       Patient Active Problem List:     Anemia     Leukocyt getting confused again    Pulled out her IV while I was coming into the room     During today's visit, the following items were reviewed: medications and lab results.   The relevant information   • QUEtiapine Fumarate  25 mg Oral Nightly   • AmLODIPine Besy >  22.0  19.0*  19.0*  19.0*   --    ALKPHO  127   --   89   --    --    --    --    AST  11*   --   9*   --    --    --    --    ALT  20   --   19   --    --    --    --    BILT  0.2   --   0.2   --    --    --    --    TP  7.5   --   6.0*   --    --    -

## 2018-11-29 NOTE — PROGRESS NOTES
[address] Cardiology Progress Note        Abeba Citizen Patient Status:  Inpatient    10/30/1955 MRN [id number]   [facility] 3NW-A Attending Olga Singleton MD   Hosp Day # 6 PCP [facility]     Subjective:  No 11/26/18   0446  11/27/18   0826  11/28/18   0556  11/29/18   0531   NA  141  141  141  141  142   --    K  5.3*  4.5  4.3  3.9  3.7  4.2   CL  114*  113*  112*  111  113*   --    CO2  21.0*  22.0  19.0*  19.0*  19.0*   --    BUN  49*  40*  34*  32*  32*

## 2018-11-29 NOTE — PROGRESS NOTES
[facility]  Report of Psychiatric Progress Note    Marta Cole Patient Status:  Inpatient    10/30/1955 MRN [id number]   [facility] 3NW-A Attending Jamil Serrano MD   Hosp Day # 6 [facility] [address] [facility]     Date of Admission: / suspected UTI. Her sensorium has improved per staff, but she continues to be inattentive and disorganized in thinking. She feels anxious and depressed and has low energy and motivation.  She has feelings of helplessness, but no hopelessness or suicidal idea History:   Procedure Laterality Date   • BACK SURGERY      c5-c7 herniated disc   • HC INCISION AND DRAINAGE PERIRECTAL ABSCESS       Family History   Family history unknown: Yes      reports that she has been smoking.   She does not have any smokeless toba Min PRN **OR** Glucose-Vitamin C (DEX-4) 4-6 GM-MG chewable tab 4 tablet, 4 tablet, Oral, Q15 Min PRN **OR** dextrose 50 % injection 50 mL, 50 mL, Intravenous, Q15 Min PRN **OR** glucose (DEX4) oral liquid 30 g, 30 g, Oral, Q15 Min PRN **OR** Glucose-Vitam

## 2018-11-30 PROCEDURE — 99232 SBSQ HOSP IP/OBS MODERATE 35: CPT | Performed by: OTHER

## 2018-11-30 PROCEDURE — 99232 SBSQ HOSP IP/OBS MODERATE 35: CPT | Performed by: INTERNAL MEDICINE

## 2018-11-30 PROCEDURE — 99232 SBSQ HOSP IP/OBS MODERATE 35: CPT | Performed by: HOSPITALIST

## 2018-11-30 RX ORDER — ESCITALOPRAM OXALATE 10 MG/1
10 TABLET ORAL DAILY
Status: DISCONTINUED | OUTPATIENT
Start: 2018-12-01 | End: 2018-12-04

## 2018-11-30 RX ORDER — CIPROFLOXACIN 2 MG/ML
400 INJECTION, SOLUTION INTRAVENOUS EVERY 12 HOURS
Status: DISCONTINUED | OUTPATIENT
Start: 2018-11-30 | End: 2018-12-04

## 2018-11-30 RX ORDER — CIPROFLOXACIN 500 MG/1
750 TABLET, FILM COATED ORAL DAILY
Qty: 15 TABLET | Refills: 0 | Status: SHIPPED | OUTPATIENT
Start: 2018-11-30 | End: 2018-12-07

## 2018-11-30 RX ORDER — QUETIAPINE 25 MG/1
12.5 TABLET, FILM COATED ORAL DAILY
Status: DISCONTINUED | OUTPATIENT
Start: 2018-11-30 | End: 2018-12-02

## 2018-11-30 RX ORDER — OLANZAPINE 10 MG/1
5 INJECTION, POWDER, LYOPHILIZED, FOR SOLUTION INTRAMUSCULAR ONCE AS NEEDED
Status: COMPLETED | OUTPATIENT
Start: 2018-11-30 | End: 2018-11-30

## 2018-11-30 NOTE — PROGRESS NOTES
[facility]  Progress Note     [facility] Patient Status:  Inpatient    10/30/1955 MRN [id number]   [facility] 3NW-A Attending Hemalatha Holt MD   Hosp Day # 7 PCP [facility]     Chief Complaint: confused    S: Patient rem --    --    --    --    AST  11*   --   9*   --    --    --    --    --    ALT  20   --   19   --    --    --    --    --    BILT  0.2   --   0.2   --    --    --    --    --    TP  7.5   --   6.0*   --    --    --    --    --     < > = values in this inte chronic     Estimated date of discharge: TBD  Discharge is dependent on: course  At this point Ms. Terrence Matthew is expected to be discharge to: TBD  Plan of care discussed with patient and RN.   Bety Monroe MD

## 2018-11-30 NOTE — PROGRESS NOTES
[address] [address] Neurology Progress Note    Maria Dolores Paulson Patient Status:  Inpatient    10/30/1955 MRN [id number]   [facility] 3NW-A Attending Magaly Ann MD   Hosp Day # 7 PCP [facility]     Subjective:  Maria Dolores Paulson delirium / TME secondary to sepsis / UTI  Hx seizures  Hx severe depression    Plan:  Seizure precautions  Cont keppra 500 mg BID  ID following, abx per ID   Avoid CNS depressants as able   Psych following   - Seroquel 25 mg at night   - Haldol 0.5 mg prn

## 2018-11-30 NOTE — PLAN OF CARE
Pt is a&o x2. Oriented to person and time, disoriented to place and situation. Patient experienced episode of HTN during shift. PRN IV metoprolol given per MAR. Other VSS and afebrile. Patient is very agitated and has been hallucinating.  Attempted to reori Patient will remain free from self-harm Progressing        SKIN/TISSUE INTEGRITY - ADULT    • Incision(s), wounds(s) or drain site(s) healing without S/S of infection Progressing

## 2018-11-30 NOTE — PROGRESS NOTES
[facility]  Report of Psychiatric Progress Note    Joyhakeem Cutler Patient Status:  Inpatient    10/30/1955 MRN [id number]   [facility] 3NW-A Attending Kumar Terrazas MD   Hosp Day # 7 [facility] [address] [facility]     Date of Admission: / Await repeat EEG    Jaidacelsa Saldana    History of Present Illness:  67 yo WF with recurrent major depressive disorder and cervical myelopathy was admitted on 11/23/18 for eval of her fever and lethargy.  She was found to have sepsis with pseudomonas (positive b  is her main support system.      Past Medical History:   Diagnosis Date   • Cervical disc disease with myelopathy    • [facility]    • Depression    • Diabetes [facility]    • Essential hypertension    • Seizure di acetaminophen (TYLENOL) tab 650 mg, 650 mg, Oral, Q6H PRN  •  ondansetron HCl (ZOFRAN) injection 4 mg, 4 mg, Intravenous, Q6H PRN  •  levETIRAcetam (KEPPRA) 500 mg in sodium chloride 0.9 % 100 mL IVPB, 500 mg, Intravenous, Q12H  •  Sodium Polystyrene Sulfo 11/30/2018    PGLU 253 11/30/2018

## 2018-11-30 NOTE — PHYSICAL THERAPY NOTE
New PT order received, chart reviewed. Pt from LTC, is dependent for mobility with use of juany lift. Pt has assist for all ADLs. Will again dc from acute PT as not appropriate for skilled intervention.

## 2018-11-30 NOTE — PLAN OF CARE
Problem: Diabetes/Glucose Control  Goal: Glucose maintained within prescribed range  INTERVENTIONS:  - Monitor Blood Glucose as ordered  - Assess for signs and symptoms of hyperglycemia and hypoglycemia  - Administer ordered medications to maintain glucose for signs and symptoms of hyperglycemia and hypoglycemia  - Administer ordered medications to maintain glucose within target range  - Assess barriers to adequate nutritional intake and initiate nutrition consult as needed  - Instruct patient on self manage in neurological status  - Initiate measures to prevent increased intracranial pressure  - Maintain blood pressure and fluid volume within ordered parameters to optimize cerebral perfusion and minimize risk of hemorrhage  - Monitor temperature, glucose, and Apply the least restrictive restraint to prevent harm  - Notify patient and family of reasons restraints applied  - Assess for any contributing factors to confusion (electrolyte disturbances, delirium, medications)  - Discontinue any unnecessary medical de

## 2018-11-30 NOTE — PROGRESS NOTES
[facility]  Nephrology Progress Note    Nelly Iglesias Patient Status:  Inpatient    10/30/1955 MRN [id number]   [facility] 3NW-A Attending Keyon Loya MD   Hosp Day # 7 PCP [facility]       SUBJECTIVE:  Cont to hallucinate i --    --    --    --    --    GLU  153*  67*  64*  133*  117*   --   143*       Recent Labs   Lab  11/23/18   2043  11/25/18   0540   ALT  20  19   AST  11*  9*   ALB  2.3*  1.7*       Recent Labs   Lab  11/29/18   0749  11/29/18   1252  11/29/18   1701 Q15 Min PRN   Or      dextrose 50 % injection 50 mL 50 mL Intravenous Q15 Min PRN   Or      glucose (DEX4) oral liquid 30 g 30 g Oral Q15 Min PRN   Or      Glucose-Vitamin C (DEX-4) 4-6 GM-MG chewable tab 8 tablet 8 tablet Oral Q15 Min PRN   Insulin Aspart

## 2018-11-30 NOTE — PROGRESS NOTES
[address] [facility]  TEL: [phone]  FAX: [phone]    Aliciacarlos enrique Galeanomary lou Patient Status:  Inpatient    10/30/1955 MRN [id number]   [facility] 3NW-A Attending Noemi Pena MD   Hosp Day # 7 PCP [facility] 29*   < >  28*  30*   --   30*   CA  8.8   < >  8.3   < >  8.4  8.5   --   8.8   ALB  2.3*   --   1.7*   --    --    --    --    --    NA  139   < >  141   < >  141  142   --   143   K  6.0*   < >  4.5   < >  3.9  3.7  4.2  4.2   CL  110   < >  113*   < >

## 2018-12-01 PROCEDURE — 99232 SBSQ HOSP IP/OBS MODERATE 35: CPT | Performed by: OTHER

## 2018-12-01 PROCEDURE — 99232 SBSQ HOSP IP/OBS MODERATE 35: CPT | Performed by: HOSPITALIST

## 2018-12-01 PROCEDURE — 95816 EEG AWAKE AND DROWSY: CPT | Performed by: OTHER

## 2018-12-01 NOTE — PROGRESS NOTES
[facility]  Progress Note     Elma Barron Patient Status:  Inpatient    10/30/1955 MRN [id number]   [facility] 3NW-A Attending Arron Garcia MD   Hosp Day # 8 PCP [facility]     Chief Complaint: confused    S: Patient's c results for input(s): PTP, INR in the last 168 hours. Recent Labs   Lab  11/30/18   0632   CK  27            Imaging: Imaging data reviewed in Epic.     Medications:   • QUEtiapine Fumarate  12.5 mg Oral Daily   • escitalopram  10 mg Oral Daily   • Cipro

## 2018-12-01 NOTE — PROCEDURES
[facility] - ELECTROENCEPHALOGRAM (EEG) REPORT  Patient Name:  Naseem Victor   MRN / CSN:  [id number] / [id number]   Date of Birth / Age:  10/30/1955 /  66year old   Encounter Date:  11/30/18         METHODS:  Twenty-two electrodes were applied according to consistent with a mild-moderate encephalopahty. No epileptiform activity is seen and no seizures are recorded.      Report covers  Start 11/30/18 at [phone]  End 1/30/18 at 1278    SIGNATURES:  [address] Neurology

## 2018-12-01 NOTE — PLAN OF CARE
CARDIOVASCULAR - ADULT    • Maintains optimal cardiac output and hemodynamic stability Progressing        Diabetes/Glucose Control    • Glucose maintained within prescribed range Progressing        NEUROLOGICAL - ADULT    • Achieves stable or improved neur

## 2018-12-01 NOTE — PROGRESS NOTES
[address] [address] Neurology Progress Note    Berhane Monsalve Patient Status:  Inpatient    10/30/1955 MRN [id number]   [facility] 3NW-A Attending Unique Mcgregor MD   Hosp Day # 8 PCP [facility]     Subjective:  Berhane Monsalve normal  Hx seizures  Hx severe depression      Plan:  Seizure precautions  Cont keppra 500 mg BID  ID following, abx per ID   Avoid CNS depressants as able   Psych following, appreciate recs   - seroquel increased to 12.5 mg at noon 25 mg nightly   - Zypre

## 2018-12-01 NOTE — PLAN OF CARE
Patient alert to self, requires frequent reorientation, agitated more tonight compared to previous night, hallucination noted. Able to sleep intermittently, improvement from previous night. Bilat upper extremity tremors noted.  On RA, no sob noted, . Red

## 2018-12-01 NOTE — PLAN OF CARE
Problem: Diabetes/Glucose Control  Goal: Glucose maintained within prescribed range  INTERVENTIONS:  - Monitor Blood Glucose as ordered  - Assess for signs and symptoms of hyperglycemia and hypoglycemia  - Administer ordered medications to maintain glucose for signs and symptoms of hyperglycemia and hypoglycemia  - Administer ordered medications to maintain glucose within target range  - Assess barriers to adequate nutritional intake and initiate nutrition consult as needed  - Instruct patient on self manage in neurological status  - Initiate measures to prevent increased intracranial pressure  - Maintain blood pressure and fluid volume within ordered parameters to optimize cerebral perfusion and minimize risk of hemorrhage  - Monitor temperature, glucose, and wearing SCD's to the patient and the risks associated with not wearing them, but patient continues to refuse to wear them. Remains confused, agitated, delirious, suspicious today, very few intermittent breaks of reality today.  Pt appears tan and continues

## 2018-12-02 PROCEDURE — 99232 SBSQ HOSP IP/OBS MODERATE 35: CPT | Performed by: OTHER

## 2018-12-02 PROCEDURE — 99232 SBSQ HOSP IP/OBS MODERATE 35: CPT | Performed by: HOSPITALIST

## 2018-12-02 RX ORDER — LEVETIRACETAM 500 MG/1
500 TABLET ORAL 2 TIMES DAILY
Status: DISCONTINUED | OUTPATIENT
Start: 2018-12-02 | End: 2018-12-04

## 2018-12-02 NOTE — PROGRESS NOTES
[facility]  Report of Psychiatric Progress Note    Dorota Murillo Patient Status:  Inpatient    10/30/1955 MRN [id number]   [facility] 3NW-A Attending Kelsey Acosta MD   Hosp Day # 9 [facility] [address] [facility]     Date of Admission: / Nadege Rao    History of Present Illness:  67 yo WF with recurrent major depressive disorder and cervical myelopathy was admitted on 11/23/18 for eval of her fever and lethargy.  She was found to have sepsis with pseudomonas (positive blood cultures) from a suspe patients with severe mental illness for [facility] before. She stopped working in 1997 when she had the cervical myelopathy and couldn't walk anymore. Her  is her main support system.      Past Medical History:   Diagnosis Date   • Cervical disc dise Topical, BID  •  Heparin Sodium (Porcine) 5000 UNIT/ML injection 5,000 Units, 5,000 Units, Subcutaneous, Q8H [address]  •  acetaminophen (TYLENOL) tab 650 mg, 650 mg, Oral, Q6H PRN  •  ondansetron HCl (ZOFRAN) injection 4 mg, 4 mg, Intravenous, Q6H PRN  •  levET

## 2018-12-02 NOTE — PROGRESS NOTES
[facility]  Progress Note     Haritha Juanjomar Patient Status:  Inpatient    10/30/1955 MRN [id number]   [facility] 3NW-A Attending Crystal Salmon MD   Hosp Day # 9 PCP [facility]     Chief Complaint: confused    S: Patient's M nystatin-triamcinolone   Topical BID   • Heparin Sodium (Porcine)  5,000 Units Subcutaneous Q8H [address]   • Insulin Aspart Pen  1-10 Units Subcutaneous TID AC and HS       ASSESSMENT / PLAN:     1. Pseudomonas sepsis due to UTI Likely  1. Cont abx   2.  JULIAN and

## 2018-12-02 NOTE — PROGRESS NOTES
12/2/18 Pt sleeping in bed at this time. Pt remains drowsy but occasionally opens her eyes to ask a question and then falls asleep again. RA. CPOX on. Tolerated soft diet without nausea reported. SCD's on. Chatman draining yellow urine freely. IVF at [facility].  Se

## 2018-12-02 NOTE — PROGRESS NOTES
[address] Neurology Progress Note    Nelly Iglesias Patient Status:  Inpatient    10/30/1955 MRN [id number]   [facility] 3NW-A Attending Patrecia Cooks, MD   Hosp Day # 9 PCP [facility]     Subjective:  Nelly Iglesias mg BID  ID following, abx per ID   Avoid CNS depressants as able   Psych following, appreciate recs                - seroquel increased to 12.5 mg at noon 25 mg nightly                - Zyprexa prn for combativeness                - Celexa 20 mg daily

## 2018-12-02 NOTE — PLAN OF CARE
Delirium    • Minimize duration of delirium Not Progressing        NEUROLOGICAL - ADULT    • Achieves stable or improved neurological status Not Progressing          CARDIOVASCULAR - ADULT    • Maintains optimal cardiac output and hemodynamic stability Pro Nystatin cream applied skin folds. VSS. Call light within reach. Frequent rounds made. Will continue to monitor.

## 2018-12-03 PROCEDURE — 99232 SBSQ HOSP IP/OBS MODERATE 35: CPT | Performed by: OTHER

## 2018-12-03 PROCEDURE — 99232 SBSQ HOSP IP/OBS MODERATE 35: CPT | Performed by: HOSPITALIST

## 2018-12-03 PROCEDURE — 99233 SBSQ HOSP IP/OBS HIGH 50: CPT | Performed by: OTHER

## 2018-12-03 RX ORDER — AMLODIPINE BESYLATE 5 MG/1
10 TABLET ORAL DAILY
Status: DISCONTINUED | OUTPATIENT
Start: 2018-12-04 | End: 2018-12-04

## 2018-12-03 NOTE — PROGRESS NOTES
12/3/18 Upon discharge, pt is asking to be sent to a different facility other than back to [facility]. Pt states \" I do not feel safe there. \" Call placed to [facility] with above.  Silvia ROWE States she will speak to pt regarding safety concerns and

## 2018-12-03 NOTE — PROGRESS NOTES
YESY HOSPITALIST  Progress Note     Dhara Duarte Patient Status:  Inpatient    10/30/1955 MRN [id number]   [facility] 3NW-A Attending Saida Larson MD   Hosp Day # 10 PCP [facility]     Chief Complaint: confused    S: Patient's Topical BID   • Heparin Sodium (Porcine)  5,000 Units Subcutaneous Q8H [address]   • Insulin Aspart Pen  1-10 Units Subcutaneous TID AC and HS       ASSESSMENT / PLAN:     1. Pseudomonas sepsis due to UTI Likely  1. Cont abx   2.  JULIAN and brain MRI unremarkable

## 2018-12-03 NOTE — PROGRESS NOTES
[address] [facility]  TEL: [phone]  FAX: [phone]    Declan Martines Patient Status:  Inpatient    10/30/1955 MRN [id number]   [facility] 3NW-A Attending Dedra Barry MD   Hosp Day # 10 PCP [facility] VICKIE contamination. No occult intra abd infection or abscess related to hidradenitis. JULIAN noted, no vegetations  - new Bcx clearing and wbc back to nl  - transitioned to cipro in view of concerns regarding cipro causing encephalopathy.  Needs 2 weeks of abx tota

## 2018-12-03 NOTE — PROGRESS NOTES
[facility]  Report of Psychiatric Progress Note    [facility] Patient Status:  Inpatient    10/30/1955 MRN [id number]   [facility] 3NW-A Attending Sharyn Howard MD   Hosp Day # 10 PCP [facility]     Date of Admission:  was admitted on 11/23/18 for eval of her fever and lethargy. She was found to have sepsis with pseudomonas (positive blood cultures) from a suspected UTI.  Her sensorium has improved per staff, but she continues to be inattentive and disorganized in thinkin Celexa for \"many years\". Substance Use History: Smokes cigarettes. Psych Family History: Mother with depression    Social and Developmental History: . No children.  She was a  for patients with severe mental illness for Thresholds Topical, PRN  •  nystatin-triamcinolone (MYCOLOG II) cream, , Topical, BID  •  Heparin Sodium (Porcine) 5000 UNIT/ML injection 5,000 Units, 5,000 Units, Subcutaneous, Q8H [address]  •  acetaminophen (TYLENOL) tab 650 mg, 650 mg, Oral, Q6H PRN  •  ondansetron HCl

## 2018-12-03 NOTE — PROGRESS NOTES
[id number] [facility] Neurology Progress Note    Martha Blood Patient Status:  Inpatient    10/30/1955 MRN [id number]   [facility] 3NW-A Attending Frieda Wyatt MD   Hosp Day # 10 PCP [facility]     Subjective:  Martha Blood Imaging:  NO NEW IMAGING     EEG 12/1/18 - abnormal study recorded in the awake and agitated states showing background slowing as noted above consistent with a mild-moderate encephalopahty. No epileptiform activity is seen and no seizures are recorded. numbness/tingling/weakness; no reported hallucinations; states she feels well from a \"cogntive standpoint\"    O:  Exam  /54 (BP Location: Right arm)   Pulse 61   Temp 98.2 °F (36.8 °C) (Oral)   Resp 16   Ht 65\"   Wt 178 lb 5.6 oz   SpO2 97%   BMI CREATSERUM 1.88 (H) 11/27/2018       CBC:    Lab Results   Component Value Date    WBC 9.5 11/29/2018    WBC 16.2 (H) 11/24/2018    WBC 17.6 (H) 11/23/2018     Lab Results   Component Value Date    HGB 8.5 (L) 11/29/2018    HGB 8.4 (L) 11/24/2018    HGB 9.

## 2018-12-04 VITALS
OXYGEN SATURATION: 98 % | TEMPERATURE: 98 F | WEIGHT: 178.38 LBS | BODY MASS INDEX: 29.72 KG/M2 | DIASTOLIC BLOOD PRESSURE: 44 MMHG | HEART RATE: 69 BPM | RESPIRATION RATE: 18 BRPM | HEIGHT: 65 IN | SYSTOLIC BLOOD PRESSURE: 157 MMHG

## 2018-12-04 PROCEDURE — 99239 HOSP IP/OBS DSCHRG MGMT >30: CPT | Performed by: HOSPITALIST

## 2018-12-04 RX ORDER — QUETIAPINE 25 MG/1
25 TABLET, FILM COATED ORAL NIGHTLY
Qty: 30 TABLET | Refills: 0 | Status: SHIPPED | OUTPATIENT
Start: 2018-12-04 | End: 2018-12-07

## 2018-12-04 RX ORDER — AMLODIPINE BESYLATE 10 MG/1
10 TABLET ORAL DAILY
Refills: 0 | Status: ON HOLD | COMMUNITY
Start: 2018-12-04 | End: 2018-12-12

## 2018-12-04 NOTE — CM/SW NOTE
Call out to state shruti Montes [phone].  VM left, awaiting call back to confirm DC and DC destination

## 2018-12-04 NOTE — PLAN OF CARE
Pt is A&Ox3, attentive & conversing appropriately w/RN. Tolerating 1800 ADA diet,  at bedside helping pt w/feeding. Pt briefed & incontinence care provided as needed. Turn q2 instructed, pt declining.   Reports generalized pain,Tylenol administere

## 2018-12-04 NOTE — CM/SW NOTE
sw received call from savannah rodarte, pts state appointed temporary guardian who consents to discharge back to [facility]. He is aware that pt wants alternate SNF placement and will work with [place] on that.  Per Orlando Dumont there are no safety concerns and pt sh

## 2018-12-04 NOTE — PHYSICAL THERAPY NOTE
Patient presented supine in bed; VSS and agreeable to participate. Performed restorative BUE/BLE AAROM regimen as directed by PT. Upon completion, patient made comfortable in bed with call light in reach. RN Celine Membreno aware of session.

## 2018-12-04 NOTE — CM/SW NOTE
Interdisciplinary Rounds: 12/04/18  Admitted: 11/23/2018 LOS: 6  Disciplines in attendance: staff nurse, CM, SW.    Care plan and discharge plan reviewed  Mental status is improved, per MD notes likely TME.     Active issues needing resolution prior to dis

## 2018-12-04 NOTE — PROGRESS NOTES
YESY HOSPITALIST  Progress Note     Nelly Iglesias Patient Status:  Inpatient    10/30/1955 MRN [id number]   [facility] 3NW-A Attending Keyon Loya MD   Hosp Day # 6 PCP [facility]     Chief Complaint: confused    S: Patient's Topical BID   • Heparin Sodium (Porcine)  5,000 Units Subcutaneous Q8H [facility]       ASSESSMENT / PLAN:     1. Pseudomonas sepsis due to UTI Likely  1. Cont abx   2. JULIAN and brain MRI unremarkable  3. ID following  2.  Acute encephalopathy with delirium/TME due

## 2018-12-04 NOTE — CM/SW NOTE
SW notified that there is a pending petition of guardianship. Petition filed by prior hospitalization at [facility]. Office of state guardian is appointed as temporary emergency guardian for all healthcare decisions.  This temporary guardianship will

## 2018-12-04 NOTE — PROGRESS NOTES
[facility]  TEL: [phone]  FAX: [phone]    Marcin Mendoza Patient Status:  Inpatient    10/30/1955 MRN [id number]   [facility] 3NW-A Attending Michael Sam MD   Hosp Day # 6 PCP [facility] VICKIE and wbc back to nl  - transitioned to cipro in view of concerns regarding cipro causing encephalopathy.  Needs 2 weeks of abx total. Would recheck cx 7-10 days later to doc resolution of BSI    2) Delirium- psych following, suspect medication and acute illn

## 2018-12-05 ENCOUNTER — MOBILE ENCOUNTER (OUTPATIENT)
Dept: INTERNAL MEDICINE UNIT | Facility: HOSPITAL | Age: 63
End: 2018-12-05

## 2018-12-05 NOTE — DISCHARGE SUMMARY
[facility] HOSPITALIST  DISCHARGE SUMMARY     Dhara Duarte Patient Status:  Inpatient    10/30/1955 MRN [id number]   [facility] 3NW-A Attending No att. providers found   Hosp Day # 11 PCP [facility]     Date of Admission: 2018  D eventually improved with adjustments to her medications. She was eventually stable for DC to [facility] on PO ABX. Lace+ Score: 43  59-90 High Risk  29-58 Medium Risk  0-28   Low Risk. TCM Follow-Up Recommendation:  LACE > 58:  High Risk of readmission afte mouth 2 (two) times daily. Refills:  0     levETIRAcetam 500 MG Tabs  Commonly known as:  KEPPRA      Take 500 mg by mouth 2 (two) times daily. Refills:  0     Polyethylene Glycol 3350 Pack  Commonly known as:  MIRALAX      Take 17 g by mouth daily. nondistended  -----------------------------------------------------------------------------------------------  PATIENT DISCHARGE INSTRUCTIONS: See electronic chart    Abby Koenig MD    Time spent:  > 30 minutes

## 2018-12-05 NOTE — PROGRESS NOTES
NURSING DISCHARGE NOTE    Discharged Nursing home via Ambulance. Accompanied by Support staff  Belongings Taken by patient/family. PT DISCHARGED BACK TO [facility] AT THIS TIME (ARRANGED BY  HERNANDEZ).  NO IV ACCESS UPON DISCHARGE. IV

## 2018-12-07 ENCOUNTER — APPOINTMENT (OUTPATIENT)
Dept: GENERAL RADIOLOGY | Facility: HOSPITAL | Age: 63
DRG: 689 | End: 2018-12-07
Attending: EMERGENCY MEDICINE
Payer: MEDICARE

## 2018-12-07 ENCOUNTER — HOSPITAL ENCOUNTER (INPATIENT)
Facility: HOSPITAL | Age: 63
LOS: 6 days | Discharge: NURSING, REHAB OR LONG-TERM CARE | DRG: 689 | End: 2018-12-13
Attending: EMERGENCY MEDICINE | Admitting: HOSPITALIST
Payer: MEDICARE

## 2018-12-07 DIAGNOSIS — N18.9 ACUTE RENAL FAILURE SUPERIMPOSED ON CHRONIC KIDNEY DISEASE, UNSPECIFIED CKD STAGE, UNSPECIFIED ACUTE RENAL FAILURE TYPE (HCC): Primary | ICD-10-CM

## 2018-12-07 DIAGNOSIS — R41.82 ALTERED MENTAL STATUS, UNSPECIFIED ALTERED MENTAL STATUS TYPE: ICD-10-CM

## 2018-12-07 DIAGNOSIS — N39.0 URINARY TRACT INFECTION WITHOUT HEMATURIA, SITE UNSPECIFIED: ICD-10-CM

## 2018-12-07 DIAGNOSIS — N17.9 ACUTE RENAL FAILURE SUPERIMPOSED ON CHRONIC KIDNEY DISEASE, UNSPECIFIED CKD STAGE, UNSPECIFIED ACUTE RENAL FAILURE TYPE (HCC): Primary | ICD-10-CM

## 2018-12-07 PROBLEM — G93.41 METABOLIC ENCEPHALOPATHY: Status: ACTIVE | Noted: 2018-12-07

## 2018-12-07 PROCEDURE — 99223 1ST HOSP IP/OBS HIGH 75: CPT | Performed by: HOSPITALIST

## 2018-12-07 PROCEDURE — 71045 X-RAY EXAM CHEST 1 VIEW: CPT | Performed by: EMERGENCY MEDICINE

## 2018-12-07 RX ORDER — CALCIUM ACETATE 667 MG/1
1 CAPSULE ORAL 2 TIMES DAILY
Status: DISCONTINUED | OUTPATIENT
Start: 2018-12-07 | End: 2018-12-13

## 2018-12-07 RX ORDER — ESCITALOPRAM OXALATE 10 MG/1
10 TABLET ORAL EVERY MORNING
Status: DISCONTINUED | OUTPATIENT
Start: 2018-12-08 | End: 2018-12-13

## 2018-12-07 RX ORDER — DOCUSATE SODIUM 100 MG/1
100 CAPSULE, LIQUID FILLED ORAL 2 TIMES DAILY
Status: DISCONTINUED | OUTPATIENT
Start: 2018-12-07 | End: 2018-12-13

## 2018-12-07 RX ORDER — ACETAMINOPHEN 325 MG/1
650 TABLET ORAL EVERY 6 HOURS PRN
Status: DISCONTINUED | OUTPATIENT
Start: 2018-12-07 | End: 2018-12-13

## 2018-12-07 RX ORDER — HEPARIN SODIUM 5000 [USP'U]/ML
5000 INJECTION, SOLUTION INTRAVENOUS; SUBCUTANEOUS EVERY 8 HOURS SCHEDULED
Status: DISCONTINUED | OUTPATIENT
Start: 2018-12-07 | End: 2018-12-13

## 2018-12-07 RX ORDER — SODIUM CHLORIDE 9 MG/ML
INJECTION, SOLUTION INTRAVENOUS CONTINUOUS
Status: ACTIVE | OUTPATIENT
Start: 2018-12-07 | End: 2018-12-07

## 2018-12-07 RX ORDER — ONDANSETRON 2 MG/ML
4 INJECTION INTRAMUSCULAR; INTRAVENOUS EVERY 6 HOURS PRN
Status: DISCONTINUED | OUTPATIENT
Start: 2018-12-07 | End: 2018-12-13

## 2018-12-07 RX ORDER — SODIUM CHLORIDE 9 MG/ML
INJECTION, SOLUTION INTRAVENOUS CONTINUOUS
Status: DISCONTINUED | OUTPATIENT
Start: 2018-12-07 | End: 2018-12-13

## 2018-12-07 RX ORDER — BISACODYL 10 MG
10 SUPPOSITORY, RECTAL RECTAL
Status: DISCONTINUED | OUTPATIENT
Start: 2018-12-07 | End: 2018-12-09

## 2018-12-07 RX ORDER — BACLOFEN 20 MG/1
20 TABLET ORAL 3 TIMES DAILY
Status: ON HOLD | COMMUNITY
End: 2018-12-22

## 2018-12-07 RX ORDER — AMLODIPINE BESYLATE 5 MG/1
10 TABLET ORAL DAILY
Status: DISCONTINUED | OUTPATIENT
Start: 2018-12-08 | End: 2018-12-13

## 2018-12-07 RX ORDER — GABAPENTIN 300 MG/1
300 CAPSULE ORAL DAILY
Status: DISCONTINUED | OUTPATIENT
Start: 2018-12-08 | End: 2018-12-10

## 2018-12-07 RX ORDER — CIPROFLOXACIN 500 MG/1
500 TABLET, FILM COATED ORAL DAILY
Status: ON HOLD | COMMUNITY
Start: 2018-12-05 | End: 2018-12-12

## 2018-12-07 RX ORDER — METOCLOPRAMIDE HYDROCHLORIDE 5 MG/ML
5 INJECTION INTRAMUSCULAR; INTRAVENOUS EVERY 8 HOURS PRN
Status: DISCONTINUED | OUTPATIENT
Start: 2018-12-07 | End: 2018-12-13

## 2018-12-07 RX ORDER — TRAZODONE HYDROCHLORIDE 50 MG/1
50 TABLET ORAL NIGHTLY PRN
COMMUNITY
End: 2019-03-18 | Stop reason: CLARIF

## 2018-12-07 RX ORDER — LABETALOL 200 MG/1
200 TABLET, FILM COATED ORAL
Status: DISCONTINUED | OUTPATIENT
Start: 2018-12-07 | End: 2018-12-13

## 2018-12-07 RX ORDER — CALCIUM ACETATE 667 MG/1
1 CAPSULE ORAL 2 TIMES DAILY
COMMUNITY

## 2018-12-07 RX ORDER — LEVETIRACETAM 500 MG/1
500 TABLET ORAL 2 TIMES DAILY
Status: DISCONTINUED | OUTPATIENT
Start: 2018-12-07 | End: 2018-12-11

## 2018-12-07 RX ORDER — BACLOFEN 10 MG/1
20 TABLET ORAL 3 TIMES DAILY
Status: DISCONTINUED | OUTPATIENT
Start: 2018-12-07 | End: 2018-12-10

## 2018-12-07 RX ORDER — GABAPENTIN 300 MG/1
300 CAPSULE ORAL DAILY
Status: ON HOLD | COMMUNITY
End: 2018-12-12

## 2018-12-07 RX ORDER — MULTIVITAMIN WITH FOLIC ACID 400 MCG
1 TABLET ORAL DAILY
COMMUNITY

## 2018-12-07 RX ORDER — HYDROCODONE BITARTRATE AND ACETAMINOPHEN 5; 325 MG/1; MG/1
1 TABLET ORAL EVERY 6 HOURS PRN
Status: ON HOLD | COMMUNITY
End: 2018-12-24

## 2018-12-07 RX ORDER — POLYETHYLENE GLYCOL 3350 17 G/17G
17 POWDER, FOR SOLUTION ORAL DAILY PRN
Status: DISCONTINUED | OUTPATIENT
Start: 2018-12-07 | End: 2018-12-13

## 2018-12-07 RX ORDER — CIPROFLOXACIN 250 MG/1
250 TABLET, FILM COATED ORAL DAILY
Status: ON HOLD | COMMUNITY
Start: 2018-12-05 | End: 2018-12-12

## 2018-12-07 NOTE — ED PROVIDER NOTES
Patient Seen in: [facility] Emergency Department    History   Patient presents with:  Altered Mental Status (neurologic)  Dyspnea MELVIN SOB (respiratory)    Stated Complaint: AMS and Hypoxia    HPI    82-year-old female with past medical history of di Exam    General:  Vitals as listed. No acute distress   HEENT: Sclerae anicteric. Conjunctivae show no pallor.   Oropharynx clear, mucous membranes moist   Neck: supple, no rigidity   Lungs: good air exchange and clear   Heart: regular rate rhythm and no All other components within normal limits   CBC W/ DIFFERENTIAL - Abnormal; Notable for the following components:    WBC 16.5 (*)     RBC 3.04 (*)     HGB 9.0 (*)     HCT 28.2 (*)     RDW 16.6 (*)     RDW-SD 55.8 (*)     Neutrophil Absolute Prelim 13.94 intraparenchymal brain abnormalities. There is nothing specific for acute infarct. There is no hemorrhage or mass lesion. Trace decreased CT attenuation the periventricular deep white matter unchanged. Skull base.  SINUSES:           No sign of acute si intracranial hemorrhage. 2. Findings most consistent with chronic small vessel ischemic change within the deep white matter. Clinical service notified of these findings with preliminary radiology report from [facility].      Dictated by: Lola Roldan Dose information is transmitted to the [facility] [employer] of Radiology) NRDR ([address]) which includes the Dose Index Registry.   PATIENT STATED HISTORY: (As transcribed by Technologist)  Patient presents with gram negative bacter stenosis at L3-4 and L4-5. 8. Extensive coronary artery calcium.     Dictated by: Markus Fraire MD on 11/26/2018 at 18:12     Approved by: Markus Fraire MD             Kidney/bladder (MSK=[id number])    Result Date: 11/25/2018  PROCEDURE:  US KIDNEY/BLADDER (C retrocardiac opacity. CONCLUSION:  Minimal opacities within the lung bases bilaterally and right mid lung zone which may represent atelectasis versus infiltrates.      Dictated by: Enrique Noel MD on 12/07/2018 at 18:21     Approved by: Rosario aVrela been admitted to telemetry medicine.   Admission disposition: 12/7/2018  6:17 PM                 Disposition and Plan     Clinical Impression:  Acute renal failure superimposed on chronic kidney disease, unspecified CKD stage, unspecified acute renal failur

## 2018-12-07 NOTE — PROGRESS NOTES
[facility] Pharmacy Note: Antimicrobial Dose Adjustment for: Vancomycin (VANCOCIN)    Saba Vincent is a 66year old female who has been prescribed vancomycin (VANCOCIN) 1000 x1 in the ED.     The order was changed to a loading dose of 1500mg (~25mg/kg adjus

## 2018-12-07 NOTE — ED INITIAL ASSESSMENT (HPI)
Pt presented via Ems from NH c/o worsening AMS and hypoxia. Pt 02 sat 86% on 1L NC pt discharged from [facility] two days ago AMS noted but worsening today with hypoxia.

## 2018-12-08 PROCEDURE — 99233 SBSQ HOSP IP/OBS HIGH 50: CPT | Performed by: HOSPITALIST

## 2018-12-08 RX ORDER — ACETAMINOPHEN 650 MG/1
650 SUPPOSITORY RECTAL EVERY 6 HOURS PRN
Status: DISCONTINUED | OUTPATIENT
Start: 2018-12-08 | End: 2018-12-13

## 2018-12-08 RX ORDER — LABETALOL HYDROCHLORIDE 5 MG/ML
10 INJECTION, SOLUTION INTRAVENOUS EVERY 6 HOURS PRN
Status: DISCONTINUED | OUTPATIENT
Start: 2018-12-08 | End: 2018-12-13

## 2018-12-08 NOTE — CONSULTS
INFECTIOUS DISEASE [facility] Patient Status:  Inpatient    10/30/1955 MRN [id number]   [facility] 3NE-A Attending Dharmesh Chisholm MD   Hosp Day # 1 PCP [facility] Q6H PRN  •  ondansetron HCl (ZOFRAN) injection 4 mg, 4 mg, Intravenous, Q6H PRN  •  Metoclopramide HCl (REGLAN) injection 5 mg, 5 mg, Intravenous, Q8H PRN  •  docusate sodium (COLACE) cap 100 mg, 100 mg, Oral, BID  •  PEG 3350 (MIRALAX) powder packet 17 g, Disp:  Rfl:    glipiZIDE 5 MG Oral Tab Take 5 mg by mouth 2 (two) times daily before meals. Disp:  Rfl:    levETIRAcetam 500 MG Oral Tab Take 500 mg by mouth 2 (two) times daily.  Disp:  Rfl:    Labetalol HCl 200 MG Oral Tab Take 200 mg by mouth 2 (two) t urinary tract infection (HCC)     Hyperkalemia     Encephalopathy in sepsis     Sepsis (HCC)     Diabetes ([facility]     CKD (chronic kidney disease) stage 3, GFR 30-59 ml/min (HCC)     AHSAN (acute kidney injury) ([facility]     History of seizure     Cognitive impair

## 2018-12-08 NOTE — ED NOTES
Bedside report received from Bertrand Michelle [facility]. Patient sleeping at this time.

## 2018-12-08 NOTE — H&P
YESY RIVERAIST  History and Physical     Serina Edouard Patient Status:  Emergency    10/30/1955 MRN [id number]   Location [facility] Attending Kenzie Parker MD   Hosp Day # 0 PCP [facility]     Chief Complaint: A 5-325 MG Oral Tab Take 1 tablet by mouth every 6 (six) hours as needed for Pain. Disp:  Rfl:    TraZODone HCl 50 MG Oral Tab Take 50 mg by mouth nightly as needed for Sleep. Disp:  Rfl:    Ciprofloxacin HCl 500 MG Oral Tab Take 500 mg by mouth daily.  For 5 asleep. HEENT: Normocephalic atraumatic. Moist mucous membranes. EOM-I. PERRLA. Anicteric. Neck: No lymphadenopathy. No JVD. No carotid bruits. Respiratory: Clear to auscultation bilaterally. No wheezes. No rhonchi.   Cardiovascular: S1, S2. Regular rate gabapentin. 7. Cognitive disorder-we will continue on Seroquel. Will use Zyprexa 5 mg IM if she is to become combative per psychiatry recommendations on last admission. Will avoid benzos and opiates. 8. Depression-we will continue on Celexa.   [address] [employer]

## 2018-12-08 NOTE — PROGRESS NOTES
YESY HOSPITALIST  Progress Note     Homero Flores Patient Status:  Inpatient    10/30/1955 MRN [id number]   [facility] 3NE-A Attending Eunice Raymond MD   Hosp Day # 1 PCP [facility]     Chief Complaint: increased confusion    S: Pa • Labetalol HCl  200 mg Oral 2x Daily(Beta Blocker)   • levETIRAcetam  500 mg Oral BID   • Heparin Sodium (Porcine)  5,000 Units Subcutaneous Q8H [facility]   • docusate sodium  100 mg Oral BID   • piperacillin-tazobactam  3.375 g Intravenous Q8H       ASSESSME

## 2018-12-08 NOTE — PROGRESS NOTES
NURSING ADMISSION NOTE      Patient admitted via Cart  Oriented to room. Safety precautions initiated. Bed in low position. Call light in reach. Assumed patient care at 2130. Nonverbal. Very lethargic, patient sleeping at this time.    Unable to c

## 2018-12-08 NOTE — PROGRESS NOTES
[facility] dosing service    Initial Pharmacokinetic Consult for Vancomycin Dosing     Nelson Sparks is a 66year old female admitted from the nursing home with altered mental status who is being treated for possible pneumonia, UTI and skin infecti will continue to follow her. We appreciate the opportunity to assist in her care.     Danyelle Torres, PharmD  12/7/2018  10:05 PM  [address] [place] [address]: [phone]

## 2018-12-08 NOTE — PROGRESS NOTES
Pharmacy Note: Renal dose adjustment for Metoclopramide (Reglan)  Naseem Section has been prescribed Metoclopramide (Reglan) 10 mg every 8 hours as needed. Estimated Creatinine Clearance: 20.5 mL/min (A) (based on SCr of 2.38 mg/dL (H)).     Her ulisses

## 2018-12-09 PROCEDURE — 99232 SBSQ HOSP IP/OBS MODERATE 35: CPT | Performed by: HOSPITALIST

## 2018-12-09 RX ORDER — DEXTROSE MONOHYDRATE 25 G/50ML
50 INJECTION, SOLUTION INTRAVENOUS
Status: DISCONTINUED | OUTPATIENT
Start: 2018-12-09 | End: 2018-12-13

## 2018-12-09 RX ORDER — BISACODYL 10 MG
10 SUPPOSITORY, RECTAL RECTAL
Status: DISCONTINUED | OUTPATIENT
Start: 2018-12-09 | End: 2018-12-13

## 2018-12-09 NOTE — PLAN OF CARE
Assumed care at [facility], but arouses to verbal stimuli. Non-verbal and does not follow commands. vss on 2L-will attempt to titrate, labetolol given for /60 , down to 155/49. Seizure precautions in place, turn/reposition q2, chronic greene.  No s

## 2018-12-09 NOTE — PHYSICAL THERAPY NOTE
Pt orders recd via fall risk protocol, chart reviewed. Per notes, pt is a resident at [address] [place] [address] and dependent for all ADL's, dependent for mobility with use of total lift for transfers.   Will dc from our services as pt is not appropriate for skilled IPPT at [facility]

## 2018-12-09 NOTE — PLAN OF CARE
DISCHARGE PLANNING    • Discharge to home or other facility with appropriate resources Not Progressing        Impaired Activities of Daily Living    • Achieve highest/safest level of independence in self care Not Progressing        MUSCULOSKELETAL - ADULT

## 2018-12-09 NOTE — PROGRESS NOTES
YESY HOSPITALIST  Progress Note     Jayde Galeana Patient Status:  Inpatient    10/30/1955 MRN [id number]   [facility] 3NE-A Attending Renetta Alcala MD   Hosp Day # 2 PCP [facility]     Chief Complaint: increased confusion    S: Pa baclofen  20 mg Oral TID   • calcium acetate  1 capsule Oral BID   • escitalopram  10 mg Oral QAM   • gabapentin  300 mg Oral Daily   • Labetalol HCl  200 mg Oral 2x Daily(Beta Blocker)   • levETIRAcetam  500 mg Oral BID   • Heparin Sodium (Porcine)  5,000

## 2018-12-10 PROBLEM — G92.8 TOXIC METABOLIC ENCEPHALOPATHY: Status: ACTIVE | Noted: 2018-11-23

## 2018-12-10 PROCEDURE — 99223 1ST HOSP IP/OBS HIGH 75: CPT | Performed by: OTHER

## 2018-12-10 PROCEDURE — 95816 EEG AWAKE AND DROWSY: CPT | Performed by: OTHER

## 2018-12-10 PROCEDURE — 99232 SBSQ HOSP IP/OBS MODERATE 35: CPT | Performed by: HOSPITALIST

## 2018-12-10 NOTE — CM/SW NOTE
MSW reviewed chart, form listed as \"guardianship\" in EMR is only a Petition for Appointment. 11:07am   MSW called Guardianship office to get copy of actual determination of guardianship.  Pt also has HCPOA on file, however this may not be valid pending

## 2018-12-10 NOTE — CONSULTS
[facility]  Report of Inpatient Wound Care Consultation     Serina Edouard Patient Status:  Inpatient    10/30/1955 MRN [id number]   [facility] 3NE-A Attending Sharon Hoyos MD   Hosp Day # 3  [facility] --    --    --    --    CA  8.5   --   8.1*   --    --    --    --    --    --    --    ALB  2.4*   --    --    --    --    --    --    --    --    --    TP  7.1   --    --    --    --    --    --    --    --    --    PGLU   --    < >   --    < >   --    < Inpatient Wound Care pager at #[phone]. Time Spent 30 Minutes. Thank you,    Glennie Cushing.  Abdiel Shabazz, PT, MPT  [address] [facility]  [address] [address]  [place], [address] [place] [address]  [phone]

## 2018-12-10 NOTE — CONSULTS
[facility]    Report of Consultation    Abeba Purdy Patient Status:  Inpatient    10/30/1955 MRN [id number]   [facility] 3NE-A Attending Ralph Rubio MD   Hosp Day # 3 [place] [id number] [facility]     Date of Admission:  2018  Date PRN **OR** dextrose 50 % injection 50 mL, 50 mL, Intravenous, Q15 Min PRN **OR** glucose (DEX4) oral liquid 30 g, 30 g, Oral, Q15 Min PRN **OR** Glucose-Vitamin C (DEX-4) 4-6 GM-MG chewable tab 8 tablet, 8 tablet, Oral, Q15 Min PRN  •  Insulin Aspart Pen ( UE's, LE's- chronically plegic    Diagnostic Data:   Lab Results   Component Value Date    CREATSERUM 1.79 12/10/2018         Prior pertinent laboratory work-up:  Component      Latest Ref Rng & Units 12/10/2018 11/26/2018   CREATININE      0.55 - 1.02 mg/ holding gabapentin, and hold any potentially sedating medications. Continue home dose of Keppra for prior history of seizure.     Thank you for allowing me to participate in the evaluation of your patient,    Vamsi Johnson, DO  Neuromuscular and General Ne

## 2018-12-10 NOTE — PROGRESS NOTES
YESY HOSPITALIST  Progress Note     Sky Taniya Patient Status:  Inpatient    10/30/1955 MRN [id number]   [facility] 3NE-A Attending Stalin Darnell MD   Hosp Day # 3 PCP [facility]     Chief Complaint: fever chills    S: Patient s Lab  12/07/18   1636   TROP  <0.046            Imaging: Imaging data reviewed in Epic.     Medications:   • piperacillin-tazobactam  3.375 g Intravenous Q8H   • Insulin Aspart Pen  1-5 Units Subcutaneous TID CC and HS   • AmLODIPine Besylate  10 mg Oral D

## 2018-12-10 NOTE — PLAN OF CARE
Delirium    • Minimize duration of delirium Progressing        DISCHARGE PLANNING    • Discharge to home or other facility with appropriate resources Progressing        Impaired Activities of Daily Living    • Achieve highest/safest level of independence i

## 2018-12-10 NOTE — PROGRESS NOTES
[facility]                INFECTIOUS DISEASE PROGRESS NOTE    Homero Flores Patient Status:  Inpatient    10/30/1955 MRN [id number]   [facility] 3NE-A Attending Eunice Raymond MD   Hosp Day # 3 PCP [facility]     Antibiotics (Preliminary result)    Collection Time: 12/07/18  4:45 PM   Result Value Ref Range    Blood Culture Result No Growth 2 Days N/A   2. URINE CULTURE, ROUTINE     Status: Abnormal    Collection Time: 12/07/18  4:36 PM   Result Value Ref Range    Urine Cultur

## 2018-12-10 NOTE — CONSULTS
ELECTROENCEPHALOGRAM REPORT      Patient Name: Alicia Herr   : 10/30/1955   Requesting Physician: Dr. Rica Hi   Date of Test: 12/10/2018   History: 66year old female with history of seizure, CKD, with waxing and waning level of sensorium    TE

## 2018-12-10 NOTE — CM/SW NOTE
Temp guardian called and left MSW message. He states he is still the [facility] maker, the case should be heard today. He will call MSW on 12/11 and update MSW on status of case.

## 2018-12-11 PROCEDURE — 99232 SBSQ HOSP IP/OBS MODERATE 35: CPT | Performed by: OTHER

## 2018-12-11 PROCEDURE — 99233 SBSQ HOSP IP/OBS HIGH 50: CPT | Performed by: HOSPITALIST

## 2018-12-11 RX ORDER — LEVETIRACETAM 500 MG/1
500 TABLET ORAL NIGHTLY
Status: DISCONTINUED | OUTPATIENT
Start: 2018-12-12 | End: 2018-12-13

## 2018-12-11 RX ORDER — LEVETIRACETAM 250 MG/1
250 TABLET ORAL DAILY
Status: DISCONTINUED | OUTPATIENT
Start: 2018-12-12 | End: 2018-12-13

## 2018-12-11 NOTE — PROGRESS NOTES
[id number] Lex[facility] Neurology Progress Note    Sky Monahan Patient Status:  Inpatient    10/30/1955 MRN [id number]   [facility] 3NE-A Attending Stalin Darnell MD   Hosp Day # 4 PCP [facility]         Subjective:  Sky Monahan Hyperglycemia     Sepsis due to urinary tract infection (HCC)     Hyperkalemia     Encephalopathy in sepsis     Sepsis (HCC)     Diabetes (HCC)     CKD (chronic kidney disease) stage 3, GFR 30-59 ml/min (HCC)     AHSAN (acute kidney injury) ([facility]     History exam pertinent for opened eyes to voice, and much more engaged today, still drowsy, answered year, hospital, did not know season, follows simple commands, EOMI, face symmetric, LE's plegic, UEs with distal weakness 3/5  EEG showing triphasic waves    Asses

## 2018-12-11 NOTE — PROGRESS NOTES
[facility]                INFECTIOUS DISEASE PROGRESS NOTE    Adair Pineda Patient Status:  Inpatient    10/30/1955 MRN [id number]   [facility] 3NE-A Attending Izaiah Witt MD   Hosp Day # 4 PCP [facility]     Antibiotics (Preliminary result)    Collection Time: 12/07/18  4:45 PM   Result Value Ref Range    Blood Culture Result No Growth 3 Days N/A   2. URINE CULTURE, ROUTINE     Status: Abnormal    Collection Time: 12/07/18  4:36 PM   Result Value Ref Range    Urine Cultur

## 2018-12-11 NOTE — PROGRESS NOTES
YESY HOSPITALIST  Progress Note     Mollysourav Kohli Patient Status:  Inpatient    10/30/1955 MRN [id number]   [facility] 3NE-A Attending Tray Jacinto MD   Hosp Day # 4 PCP [facility]     Chief Complaint: lethargy    S: Patient feels <0.046            Imaging: Imaging data reviewed in Epic.     Medications:   • piperacillin-tazobactam  3.375 g Intravenous Q8H   • Insulin Aspart Pen  1-5 Units Subcutaneous TID CC and HS   • AmLODIPine Besylate  10 mg Oral Daily   • calcium acetate  1 cap

## 2018-12-11 NOTE — PLAN OF CARE
Delirium    • Minimize duration of delirium Not Progressing        DISCHARGE PLANNING    • Discharge to home or other facility with appropriate resources Not Progressing        Impaired Activities of Daily Living    • Achieve highest/safest level of indepe

## 2018-12-12 PROCEDURE — 99232 SBSQ HOSP IP/OBS MODERATE 35: CPT | Performed by: OTHER

## 2018-12-12 PROCEDURE — 99232 SBSQ HOSP IP/OBS MODERATE 35: CPT | Performed by: HOSPITALIST

## 2018-12-12 RX ORDER — AMOXICILLIN AND CLAVULANATE POTASSIUM 875; 125 MG/1; MG/1
1 TABLET, FILM COATED ORAL EVERY 12 HOURS SCHEDULED
Status: DISCONTINUED | OUTPATIENT
Start: 2018-12-12 | End: 2018-12-13

## 2018-12-12 RX ORDER — AMLODIPINE BESYLATE 10 MG/1
10 TABLET ORAL DAILY
Status: ON HOLD | COMMUNITY
End: 2018-12-12 | Stop reason: CLARIF

## 2018-12-12 RX ORDER — BACLOFEN 10 MG/1
20 TABLET ORAL 3 TIMES DAILY
Status: DISCONTINUED | OUTPATIENT
Start: 2018-12-12 | End: 2018-12-13

## 2018-12-12 RX ORDER — LEVETIRACETAM 250 MG/1
250 TABLET ORAL DAILY
Qty: 30 TABLET | Refills: 0 | Status: ON HOLD | OUTPATIENT
Start: 2018-12-13 | End: 2018-12-24

## 2018-12-12 RX ORDER — BACLOFEN 20 MG/1
20 TABLET ORAL 3 TIMES DAILY
Qty: 90 TABLET | Refills: 0 | Status: SHIPPED | OUTPATIENT
Start: 2018-12-12

## 2018-12-12 RX ORDER — AMLODIPINE BESYLATE 5 MG/1
5 TABLET ORAL DAILY
COMMUNITY

## 2018-12-12 RX ORDER — LEVETIRACETAM 500 MG/1
500 TABLET ORAL NIGHTLY
Qty: 30 TABLET | Refills: 0 | Status: ON HOLD | OUTPATIENT
Start: 2018-12-12 | End: 2018-12-24

## 2018-12-12 NOTE — PROGRESS NOTES
[id number] Lexi [facility] Neurology Progress Note    Marcin Mendoza Patient Status:  Inpatient    10/30/1955 MRN [id number]   [facility] 3NE-A Attending Sun Tabor MD   Hosp Day # 5 PCP [facility]         Subjective:  Jerman Bond Leukocytosis     Azotemia     Metabolic acidosis     Toxic metabolic encephalopathy     Hyperglycemia     Sepsis due to urinary tract infection (HCC)     Hyperkalemia     Encephalopathy in sepsis     Sepsis (HCC)     Diabetes (HCC)     CKD (chronic kidney note with following additions:  S: spoke to  through RN, patient had 3 seizure like events 5 months ago, foaming at mouth, was started on Keppra, and has not had any since. He is not clear if she had low sodium.    O: BP (!) 171/60 (BP Location: Left

## 2018-12-12 NOTE — PLAN OF CARE
Resumed care of pt. At 2300. Pt. Is Ax2-3, calm, and cooperative. Zosyn for UTI  Carb 1800 diet-Order/set-up/assist feed  Accu: QID  . 5L O2 NC-weaning down  Chronic Chatman  Seizure precautions maintained  PIV:.9/83  SCD's and Heparin  Daily dressing galaviz

## 2018-12-12 NOTE — CM/SW NOTE
Spoke with Grant Huang at [facility] 930 re: possible d/c back today. Updates sent via [address].  left a message for  (temporary guardian) re: status since he was to return to court. Awaiting return call.

## 2018-12-12 NOTE — PLAN OF CARE
Pt is alert and orientated to person, place and situation she was unable to tell me the year. She is slow to respond. She was able to feed herself after I sat up her tray. Continue to turn q 2 hours and to comfort. Chatman in place, urine is cloudy yellow.

## 2018-12-12 NOTE — PROGRESS NOTES
[facility] HOSPITALIST  Progress Note     Mohini Garrett Patient Status:  Inpatient    10/30/1955 MRN [id number]   [facility] 3NE-A Attending Krysta Stout MD   Hosp Day # 5 PCP [facility]     Chief Complaint: UTI  S:  Patient denies levETIRAcetam  250 mg Oral Daily   • levETIRAcetam  500 mg Oral Nightly   • Insulin Aspart Pen  1-5 Units Subcutaneous TID CC and HS   • AmLODIPine Besylate  10 mg Oral Daily   • calcium acetate  1 capsule Oral BID   • escitalopram  10 mg Oral QAM   • Labe

## 2018-12-12 NOTE — PROCEDURES
See note on 12/10/18 labeled \"consult\". Triphasic waves and significant slowing, consistent with a toxic metabolic encephalopathy.

## 2018-12-12 NOTE — CM/SW NOTE
Second call out to savannah rodarte regarding pts discharge and status of guardianship. Awaiting call back. Will need to confirm pts guardianship status and confirm DC destination of [facility] if Mr Mikle Boas is indeed still the guardian.       [facility]

## 2018-12-12 NOTE — PROGRESS NOTES
[facility]                INFECTIOUS DISEASE PROGRESS NOTE    Adair Pineda Patient Status:  Inpatient    10/30/1955 MRN [id number]   [facility] 3NE-A Attending Izaiah Witt MD   Hosp Day # 5 PCP [facility]     Antibiotics (Preliminary result)    Collection Time: 12/07/18  4:45 PM   Result Value Ref Range    Blood Culture Result No Growth 4 Days N/A   2. URINE CULTURE, ROUTINE     Status: Abnormal    Collection Time: 12/07/18  4:36 PM   Result Value Ref Range    Urine Cultur

## 2018-12-12 NOTE — CM/SW NOTE
Office of the guardian has called--shared mr Nimo Melendez is off today--advised to call/follow up tomorrow.   Patient unable to discharge back today--nurse updated

## 2018-12-13 VITALS
OXYGEN SATURATION: 93 % | SYSTOLIC BLOOD PRESSURE: 168 MMHG | BODY MASS INDEX: 30.18 KG/M2 | WEIGHT: 170.31 LBS | DIASTOLIC BLOOD PRESSURE: 57 MMHG | HEIGHT: 63 IN | TEMPERATURE: 99 F | RESPIRATION RATE: 18 BRPM | HEART RATE: 63 BPM

## 2018-12-13 PROCEDURE — 99239 HOSP IP/OBS DSCHRG MGMT >30: CPT | Performed by: HOSPITALIST

## 2018-12-13 NOTE — PLAN OF CARE
Resumed care of pt. At [address]. Pt. Is Ax4 but forgetful.     Seizure precautions maintained-Keppra  Chatman-chronic for urinary retention  Meds whole with water  PO Augmentin  Carb-1800 ulisses diet/order-setup-assist feed  Accu: QID  2L O2 NC  Daily dressing chapa

## 2018-12-13 NOTE — CM/SW NOTE
MSW made referrals to [facility] closer to pt's spouse's home in [place]. Pt also wants a place that allows smoking. MSW spoke to pt that if no bed is found elsewhere that Pt will d/c to [facility]. She is agreeable. 2:31pm  MSW set up BLS for 4pm, (Rn aware).  MS

## 2018-12-13 NOTE — CM/SW NOTE
10:40am  MSW spoke to Mr. Evonne Ordoñez, he is agreeable for d/c today. He is agreeable to pt's request to go to [facility] in [place]. If [facility] says no pt will transfer to [facility]. MSW spoke to patient as well who is agreeable to above plan.

## 2018-12-13 NOTE — PROGRESS NOTES
NURSING DISCHARGE NOTE    Discharged to [facility] via Ambulance. Accompanied by Support staff  Belongings Taken by patient/family. Discharge instructions/AVS and transfer summary sent with patient via EMS/ambulance back to [facility].   Report called t

## 2018-12-13 NOTE — PROGRESS NOTES
Received patient at 0730  A/O x 4  Cognition significantly improved  Accuchecks QID  SS Insulin  Heparin SQ for VTE prophylaxis  Eating/drinking well  Awaiting placement in [facility]  Patient requesting [facility] closer to  in [facility]  SW working on placement  W

## 2018-12-13 NOTE — PROGRESS NOTES
[facility]                INFECTIOUS DISEASE PROGRESS NOTE    Berhane Monsalve Patient Status:  Inpatient    10/30/1955 MRN [id number]   [facility] 3NE-A Attending Sherry Griffith MD   Hosp Day # 6 PCP [facility]     Antibiotics Collection Time: 12/07/18  4:45 PM   Result Value Ref Range    Blood Culture Result No Growth 5 Days N/A   2. URINE CULTURE, ROUTINE     Status: Abnormal    Collection Time: 12/07/18  4:36 PM   Result Value Ref Range    Urine Culture >100,000 CFU/ML Ent

## 2018-12-13 NOTE — PROGRESS NOTES
[facility] HOSPITALIST  Progress Note     Abeba Citizen Patient Status:  Inpatient    10/30/1955 MRN [id number]   [facility] 3NE-A Attending Mars Conteh MD   Hosp Day # 6 PCP [facility]     Chief Complaint: UTI  S:  Patient denies levETIRAcetam  250 mg Oral Daily   • levETIRAcetam  500 mg Oral Nightly   • Insulin Aspart Pen  1-5 Units Subcutaneous TID CC and HS   • AmLODIPine Besylate  10 mg Oral Daily   • calcium acetate  1 capsule Oral BID   • escitalopram  10 mg Oral QAM   • Labe

## 2018-12-14 NOTE — DISCHARGE SUMMARY
[facility] HOSPITAL[facility]  DISCHARGE SUMMARY     Martha Blood Patient Status:  Inpatient    10/30/1955 MRN [id number]   [facility] 3NE-A Attending No att. providers found   Hosp Day # 6 PCP [facility]     Date of Admission: 2018  Deep cooperate with exam or answer any questions due to the mental status. No reported fevers, vomiting, diarrhea, constipation from nursing home. No hematochezia, melena, hematuria, hemoptysis, hematemesis.     Brief Synopsis: The patient's encephalopathy imp KEPPRA  What changed:    · medication strength  · how much to take  · when to take this      Take 1 tablet (250 mg total) by mouth daily.    Quantity:  30 tablet  Refills:  0        CONTINUE taking these medications      Instructions Prescription details prescriptions at the location directed by your doctor or nurse    Bring a paper prescription for each of these medications  · baclofen 20 MG Tabs  · levETIRAcetam 250 MG Tabs  · levETIRAcetam 500 MG Tabs         Follow-up appointment:   Lan Arrington

## 2018-12-22 ENCOUNTER — HOSPITAL ENCOUNTER (INPATIENT)
Facility: HOSPITAL | Age: 63
LOS: 2 days | Discharge: NURSING, REHAB OR LONG-TERM CARE | DRG: 091 | End: 2018-12-24
Admitting: HOSPITALIST
Payer: MEDICARE

## 2018-12-22 ENCOUNTER — APPOINTMENT (OUTPATIENT)
Dept: GENERAL RADIOLOGY | Facility: HOSPITAL | Age: 63
DRG: 091 | End: 2018-12-22
Payer: MEDICARE

## 2018-12-22 ENCOUNTER — APPOINTMENT (OUTPATIENT)
Dept: CT IMAGING | Facility: HOSPITAL | Age: 63
DRG: 091 | End: 2018-12-22
Attending: HOSPITALIST
Payer: MEDICARE

## 2018-12-22 DIAGNOSIS — R41.82 ALTERED MENTAL STATUS, UNSPECIFIED ALTERED MENTAL STATUS TYPE: Primary | ICD-10-CM
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
## 2018-12-22 DIAGNOSIS — N12 PYELONEPHRITIS: ICD-10-CM

## 2018-12-22 DIAGNOSIS — J18.9 NOSOCOMIAL PNEUMONIA: ICD-10-CM

## 2018-12-22 PROCEDURE — 70450 CT HEAD/BRAIN W/O DYE: CPT | Performed by: HOSPITALIST

## 2018-12-22 PROCEDURE — 99223 1ST HOSP IP/OBS HIGH 75: CPT | Performed by: HOSPITALIST

## 2018-12-22 PROCEDURE — 71045 X-RAY EXAM CHEST 1 VIEW: CPT

## 2018-12-22 RX ORDER — METOPROLOL TARTRATE 5 MG/5ML
5 INJECTION INTRAVENOUS EVERY 6 HOURS
Status: DISCONTINUED | OUTPATIENT
Start: 2018-12-22 | End: 2018-12-22

## 2018-12-22 RX ORDER — NYSTATIN 100000 U/G
100000 CREAM TOPICAL 2 TIMES DAILY PRN
COMMUNITY
End: 2019-04-05 | Stop reason: CLARIF

## 2018-12-22 RX ORDER — HEPARIN SODIUM 5000 [USP'U]/ML
5000 INJECTION, SOLUTION INTRAVENOUS; SUBCUTANEOUS EVERY 8 HOURS SCHEDULED
Status: DISCONTINUED | OUTPATIENT
Start: 2018-12-22 | End: 2018-12-22

## 2018-12-22 RX ORDER — FUROSEMIDE 10 MG/ML
20 INJECTION INTRAMUSCULAR; INTRAVENOUS ONCE
Status: COMPLETED | OUTPATIENT
Start: 2018-12-22 | End: 2018-12-22

## 2018-12-22 RX ORDER — LORAZEPAM 2 MG/ML
1 INJECTION INTRAMUSCULAR ONCE
Status: DISCONTINUED | OUTPATIENT
Start: 2018-12-22 | End: 2018-12-24

## 2018-12-22 RX ORDER — ACETAMINOPHEN 650 MG/1
650 SUPPOSITORY RECTAL EVERY 6 HOURS PRN
Status: DISCONTINUED | OUTPATIENT
Start: 2018-12-22 | End: 2018-12-24

## 2018-12-22 RX ORDER — ACETAMINOPHEN 650 MG/1
1300 SUPPOSITORY RECTAL ONCE
Status: COMPLETED | OUTPATIENT
Start: 2018-12-22 | End: 2018-12-22

## 2018-12-22 RX ORDER — INSULIN ASPART 100 [IU]/ML
0.1 INJECTION, SOLUTION INTRAVENOUS; SUBCUTANEOUS ONCE
Status: COMPLETED | OUTPATIENT
Start: 2018-12-22 | End: 2018-12-22

## 2018-12-22 RX ORDER — CLINDAMYCIN PHOSPHATE 600 MG/50ML
600 INJECTION INTRAVENOUS EVERY 8 HOURS
Status: DISCONTINUED | OUTPATIENT
Start: 2018-12-22 | End: 2018-12-22

## 2018-12-22 RX ORDER — ONDANSETRON 2 MG/ML
4 INJECTION INTRAMUSCULAR; INTRAVENOUS EVERY 6 HOURS PRN
Status: DISCONTINUED | OUTPATIENT
Start: 2018-12-22 | End: 2018-12-24

## 2018-12-22 RX ORDER — HYDRALAZINE HYDROCHLORIDE 20 MG/ML
10 INJECTION INTRAMUSCULAR; INTRAVENOUS EVERY 6 HOURS PRN
Status: DISCONTINUED | OUTPATIENT
Start: 2018-12-22 | End: 2018-12-24

## 2018-12-22 RX ORDER — ASPIRIN 325 MG
325 TABLET, DELAYED RELEASE (ENTERIC COATED) ORAL DAILY
Status: DISCONTINUED | OUTPATIENT
Start: 2018-12-22 | End: 2018-12-24

## 2018-12-22 RX ORDER — BISACODYL 10 MG
10 SUPPOSITORY, RECTAL RECTAL
Status: DISCONTINUED | OUTPATIENT
Start: 2018-12-22 | End: 2018-12-24

## 2018-12-22 RX ORDER — METOPROLOL TARTRATE 5 MG/5ML
5 INJECTION INTRAVENOUS ONCE
Status: DISCONTINUED | OUTPATIENT
Start: 2018-12-22 | End: 2018-12-22

## 2018-12-22 RX ORDER — LABETALOL 200 MG/1
200 TABLET, FILM COATED ORAL 2 TIMES DAILY
Status: DISCONTINUED | OUTPATIENT
Start: 2018-12-22 | End: 2018-12-24

## 2018-12-22 RX ORDER — SODIUM CHLORIDE, SODIUM LACTATE, POTASSIUM CHLORIDE, CALCIUM CHLORIDE 600; 310; 30; 20 MG/100ML; MG/100ML; MG/100ML; MG/100ML
INJECTION, SOLUTION INTRAVENOUS CONTINUOUS
Status: DISCONTINUED | OUTPATIENT
Start: 2018-12-22 | End: 2018-12-22

## 2018-12-22 RX ORDER — METOCLOPRAMIDE HYDROCHLORIDE 5 MG/ML
10 INJECTION INTRAMUSCULAR; INTRAVENOUS EVERY 8 HOURS PRN
Status: DISCONTINUED | OUTPATIENT
Start: 2018-12-22 | End: 2018-12-22 | Stop reason: DRUGHIGH

## 2018-12-22 RX ORDER — METOCLOPRAMIDE HYDROCHLORIDE 5 MG/ML
5 INJECTION INTRAMUSCULAR; INTRAVENOUS EVERY 8 HOURS PRN
Status: DISCONTINUED | OUTPATIENT
Start: 2018-12-22 | End: 2018-12-24

## 2018-12-22 RX ORDER — DEXTROSE MONOHYDRATE 25 G/50ML
50 INJECTION, SOLUTION INTRAVENOUS
Status: DISCONTINUED | OUTPATIENT
Start: 2018-12-22 | End: 2018-12-24

## 2018-12-22 RX ORDER — ESCITALOPRAM OXALATE 5 MG/1
5 TABLET ORAL DAILY
Status: DISCONTINUED | OUTPATIENT
Start: 2018-12-22 | End: 2018-12-24

## 2018-12-22 RX ORDER — METOPROLOL TARTRATE 5 MG/5ML
5 INJECTION INTRAVENOUS EVERY 6 HOURS
Status: DISCONTINUED | OUTPATIENT
Start: 2018-12-22 | End: 2018-12-24

## 2018-12-22 RX ORDER — LABETALOL HYDROCHLORIDE 5 MG/ML
10 INJECTION, SOLUTION INTRAVENOUS ONCE
Status: COMPLETED | OUTPATIENT
Start: 2018-12-22 | End: 2018-12-22

## 2018-12-22 RX ORDER — LABETALOL HYDROCHLORIDE 5 MG/ML
10 INJECTION, SOLUTION INTRAVENOUS EVERY 4 HOURS PRN
Status: DISCONTINUED | OUTPATIENT
Start: 2018-12-22 | End: 2018-12-24

## 2018-12-22 RX ORDER — POLYETHYLENE GLYCOL 3350 17 G/17G
17 POWDER, FOR SOLUTION ORAL DAILY PRN
Status: DISCONTINUED | OUTPATIENT
Start: 2018-12-22 | End: 2018-12-24

## 2018-12-22 RX ORDER — AMLODIPINE BESYLATE 5 MG/1
10 TABLET ORAL DAILY
Status: DISCONTINUED | OUTPATIENT
Start: 2018-12-22 | End: 2018-12-24

## 2018-12-22 RX ORDER — DOCUSATE SODIUM 100 MG/1
100 CAPSULE, LIQUID FILLED ORAL 2 TIMES DAILY
Status: DISCONTINUED | OUTPATIENT
Start: 2018-12-22 | End: 2018-12-22

## 2018-12-22 RX ORDER — METOPROLOL TARTRATE 5 MG/5ML
5 INJECTION INTRAVENOUS ONCE
Status: COMPLETED | OUTPATIENT
Start: 2018-12-22 | End: 2018-12-22

## 2018-12-22 RX ORDER — DOCUSATE SODIUM 100 MG/1
100 CAPSULE, LIQUID FILLED ORAL 2 TIMES DAILY
Status: DISCONTINUED | OUTPATIENT
Start: 2018-12-22 | End: 2018-12-24

## 2018-12-22 RX ORDER — ACETAMINOPHEN 325 MG/1
650 TABLET ORAL EVERY 6 HOURS PRN
Status: DISCONTINUED | OUTPATIENT
Start: 2018-12-22 | End: 2018-12-24

## 2018-12-22 RX ORDER — SODIUM CHLORIDE 9 MG/ML
INJECTION, SOLUTION INTRAVENOUS CONTINUOUS
Status: DISCONTINUED | OUTPATIENT
Start: 2018-12-22 | End: 2018-12-22

## 2018-12-22 RX ORDER — ASPIRIN 300 MG
325 SUPPOSITORY, RECTAL RECTAL DAILY
Status: DISCONTINUED | OUTPATIENT
Start: 2018-12-22 | End: 2018-12-24

## 2018-12-22 RX ORDER — CLINDAMYCIN PHOSPHATE 600 MG/50ML
600 INJECTION INTRAVENOUS ONCE
Status: COMPLETED | OUTPATIENT
Start: 2018-12-22 | End: 2018-12-22

## 2018-12-22 RX ORDER — BACLOFEN 10 MG/1
20 TABLET ORAL 3 TIMES DAILY
Status: DISCONTINUED | OUTPATIENT
Start: 2018-12-22 | End: 2018-12-24

## 2018-12-22 NOTE — SLP NOTE
Attempted to see patient for swallowing evaluation. Patient was asleep and unable to to arouse for swallowing evaluation. Spoke to [facility].  RN reported patient has not been demonstrated being appropriate for a swallowing evaluation since admission to

## 2018-12-22 NOTE — PLAN OF CARE
Assumed patient care at . Patient nonverbal, lethargic. Admission navigators completed with  via phone. Patient remains NPO. On 6L, . Febrile. PRN tylenol. BP elevated. Scheduled BP medications adminisitered. Bed alarm activated.   Jesica

## 2018-12-22 NOTE — CONSULTS
[facility] Pharmacy Note:  Renal Dose Adjustment for Metoclopramide (REGLAN)    Nereida Howell has been prescribed Metoclopramide (REGLAN) 10 mg every 8 hours as needed for N/V.     Estimated Creatinine Clearance: 20.4 mL/min (A) (based on SCr of 2.4 mg/dL (H

## 2018-12-22 NOTE — CONSULTS
INFECTIOUS DISEASE [facility] Patient Status:  Inpatient    10/30/1955 MRN [id number]   [facility] 4NW-A Attending Deya Aguirre MD   Hosp Day # 0 PCP [facility] VICKIE PRN **OR** Glucose-Vitamin C (DEX-4) 4-6 GM-MG chewable tab 4 tablet, 4 tablet, Oral, Q15 Min PRN **OR** dextrose 50 % injection 50 mL, 50 mL, Intravenous, Q15 Min PRN **OR** glucose (DEX4) oral liquid 30 g, 30 g, Oral, Q15 Min PRN **OR** Glucose-Vitamin C Oral Tab Take 20 mg by mouth 3 (three) times daily. Disp:  Rfl:    acetaminophen 325 MG Oral Tab Take 650 mg by mouth every 6 (six) hours as needed for Pain. Disp:  Rfl:    Citalopram Hydrobromide 20 MG Oral Tab Take 20 mg by mouth daily.  Disp:  Rfl:    do Microbiologic Data:   No results found for this visit on 12/22/18.       Imaging:  CXR gen congestino    Problem list reviewed:  Patient Active Problem List:     Anemia     Leukocytosis     Azotemia     Metabolic acidosis     Toxic metabolic encepha

## 2018-12-22 NOTE — PROGRESS NOTES
Hospitalist follow up note from this morning    Patient lethargic with stable vitals,  Has been hypertensive since admission without much improvement with antihypertensives    Chart reviewed- patient with same presentation during previous admission with le

## 2018-12-22 NOTE — ED INITIAL ASSESSMENT (HPI)
Pt arrives from nursing home with increased AMS, pt is presently being treated for a UTI, febrile at this time.

## 2018-12-22 NOTE — H&P
[facility] HOSPITALIST  History and Physical     Auglaize Section Patient Status:  Emergency    10/30/1955 MRN [id number]   Location [address] [facility] Attending Romina Stewart MD   Hosp Day # 0 PCP [facility]     Chief Complaint: A 500 MG Oral Tab Take 1 tablet (500 mg total) by mouth nightly. Disp: 30 tablet Rfl: 0   AmLODIPine Besylate 5 MG Oral Tab Take 10 mg by mouth daily. Disp:  Rfl:    baclofen 20 MG Oral Tab Take 1 tablet (20 mg total) by mouth 3 (three) times daily.  Disp: 90 Regular rate and rhythm. No murmurs, rubs or gallops. Equal pulses. Chest and Back: No tenderness or deformity. Abdomen: Soft, nontender, nondistended. Positive bowel sounds. No rebound, guarding or organomegaly.   Neurologic: No focal neurological defi

## 2018-12-22 NOTE — ED PROVIDER NOTES
Patient Seen in: [facility] Emergency Department    History   Patient presents with:  Altered Mental Status (neurologic)  Febrile Seizure (neurologic)    Stated Complaint: AMS, febrile    HPI    65-year-old female with altered bowel status and fever developed, well nourished, alert       Head: Normocephalic and atraumatic. Sclera anicteric, conjunctiva pink and moist.   PERRL, EOMI    Mucus membranes pink and moist,   Neck: Supple with normal range of motion.   No lymphadenopathy, no meningismus, components:    WBC 15.6 (*)     RBC 3.77 (*)     HGB 10.9 (*)     HCT 33.8 (*)     RDW-SD 53.0 (*)     Neutrophil Absolute Prelim 14.45 (*)     Neutrophil Absolute 14.45 (*)     Lymphocyte Absolute 0.72 (*)     All other components within normal limits   L diagnosis)  Pyelonephritis  Nosocomial pneumonia    Disposition:  Admit  12/22/2018  5:20 am    Follow-up:  No follow-up provider specified.       Medications Prescribed:  Current Discharge Medication List        Present on Admission  Date Reviewed: 11/24/2

## 2018-12-22 NOTE — PROGRESS NOTES
Asked to evaluate patient at bedside by RN    Patient arrived to the floors, lethargic,  Requiring increased o2-  Currently on 8 L    Responds to pain and opens eye very briefly, does not appear to be in distress but lethargic    Chest- coarse bilaterally

## 2018-12-22 NOTE — PLAN OF CARE
Patient lethargic, not following commands, opens eyes, responds to Painful stimuli. Respirations non-labored, O2 sat 93% on O2 5l/nc. 'A-197'O systolic. Labetalol given, still 723'Q-084'W systolic after IV Labetalol given.  Patient with left arm contr

## 2018-12-22 NOTE — PROGRESS NOTES
NURSING ADMISSION NOTE      Patient admitted via Cart  Oriented to room. Safety precautions initiated. Bed in low position. Call light in reach. Patietn admitted from ER. Patient lethargic, non-verbal, responds to painful stimuli.  's-190's sy

## 2018-12-23 ENCOUNTER — APPOINTMENT (OUTPATIENT)
Dept: MRI IMAGING | Facility: HOSPITAL | Age: 63
DRG: 091 | End: 2018-12-23
Attending: Other
Payer: MEDICARE

## 2018-12-23 PROCEDURE — 70551 MRI BRAIN STEM W/O DYE: CPT | Performed by: OTHER

## 2018-12-23 PROCEDURE — 99223 1ST HOSP IP/OBS HIGH 75: CPT | Performed by: OTHER

## 2018-12-23 PROCEDURE — 99232 SBSQ HOSP IP/OBS MODERATE 35: CPT | Performed by: HOSPITALIST

## 2018-12-23 PROCEDURE — 95816 EEG AWAKE AND DROWSY: CPT | Performed by: OTHER

## 2018-12-23 RX ORDER — NICOTINE 21 MG/24HR
1 PATCH, TRANSDERMAL 24 HOURS TRANSDERMAL DAILY
Status: DISCONTINUED | OUTPATIENT
Start: 2018-12-23 | End: 2018-12-24

## 2018-12-23 RX ORDER — LORAZEPAM 2 MG/ML
1 INJECTION INTRAMUSCULAR
Status: COMPLETED | OUTPATIENT
Start: 2018-12-23 | End: 2018-12-23

## 2018-12-23 NOTE — PLAN OF CARE
CARDIOVASCULAR - ADULT    • Maintains optimal cardiac output and hemodynamic stability Progressing        Delirium    • Minimize duration of delirium Progressing        GASTROINTESTINAL - ADULT    • Maintains adequate nutritional intake (undernourished) Pr

## 2018-12-23 NOTE — SLP NOTE
ADULT SWALLOWING EVALUATION    ASSESSMENT    ASSESSMENT/OVERALL IMPRESSION:  Order received for BSSE and completed. D/w RN prior to entering room. Pt to go for MRI to r/o acute/subacute CVA. Pt had a seizure overnight, currently on seizure precautions.  Pt MEDICAL HISTORY  Reason for Referral: R/O aspiration    Problem List  Principal Problem:    Altered mental status, unspecified altered mental status type  Active Problems:    Toxic metabolic encephalopathy    Diabetes ([facility] [address]    CKD (chronic kidney disease Impaired  Laryngeal Elevation Timing: Appears intact  Laryngeal Elevation Strength: Appears intact  Laryngeal Elevation Coordination: Appears impaired  (Please note: Silent aspiration cannot be evaluated clinically.  Videofluoroscopic Swallow Study is requi

## 2018-12-23 NOTE — PROCEDURES
Date of Procedure: 12/23/2018    Procedure: EEG (ELECTROENCEPHALOGRAM)     DX:SPEECH DIFFICULTY  HX:  66 YR OLD FEMALE WHO WAS DISCHARGED FROM HOSPITAL 10 DAYS AGO IS BACK FROM NH WITH AMS  PMH:  HTN, SEIZURE DISORDER, DM2, COPD  RX: TYLENOL, Isaac Stockton

## 2018-12-23 NOTE — PLAN OF CARE
Problem: Impaired Swallowing  Goal: Minimize aspiration risk  Interventions:  Nectar-thick liquids  VFSS if s/s of aspiration persist  Full assist with all meals  Regular oral care  - Patient should be alert and upright for all feedings (90 degrees preferr

## 2018-12-23 NOTE — CONSULTS
[id number] Lexi Sandhu Neurology Consultation    Date of consult: 12/23/2018    Reason for consult: altered mental status    HPI: Alicia Herr is a 66year old female with past medical history as listed of hypertension, siezure disorder, type 2 diabe injection 5 mg 5 mg Intravenous Q6H   Meropenem (MERREM) 500 mg in sodium chloride 0.9% 100 mL  mg Intravenous Q12H   hydrALAzine HCl (APRESOLINE) injection 10 mg 10 mg Intravenous Q6H PRN   acetaminophen (TYLENOL) 650 MG rectal suppository 650 mg 6 cognitive slowing, awake but following some commands  Language: aphasic  Speech: no dysarthria  CN: II-XII    pupils 2-3 mm equal and reactive to light  III, IV, VI extraocular movements intact, no nystagmus, no ptosis  V face sensation normal  VII face sy Pyelonephritis    Nosocomial pneumonia    Recommendations:  Neuro check    mg  Keep SBP >110  Avoid hypotensive events for a better cerebral perfusion  dysphagia screen  MRI brain today, if positive, needs stroke protocol  EEG  Cont keppra 750 mg /

## 2018-12-23 NOTE — PROGRESS NOTES
[facility]                INFECTIOUS DISEASE PROGRESS NOTE    Marcin Mendoza Patient Status:  Inpatient    10/30/1955 MRN [id number]   [facility] 3NE-A Attending Stiven Lopez MD   Hosp Day # 1 PCP Krzysztof Ferreira Azotemia     Metabolic acidosis     Toxic metabolic encephalopathy     Hyperglycemia     Sepsis due to urinary tract infection (HCC)     Hyperkalemia     Encephalopathy in sepsis     Sepsis (HCC)     Diabetes (HCC)     CKD (chronic kidney disease) stage 3,

## 2018-12-23 NOTE — PROGRESS NOTES
[facility] HOSPITALIST  Progress Note     Evon Ho Patient Status:  Inpatient    10/30/1955 MRN [id number]   [facility] 3NE-A Attending Chanel Rubi MD   Hosp Day # 1 PCP [facility]     Chief Complaint: AMS    S: Patient has no Tartrate  5 mg Intravenous Q6H   • meropenem  500 mg Intravenous Q12H   • AmLODIPine Besylate  10 mg Oral Daily   • baclofen  20 mg Oral TID   • escitalopram  5 mg Oral Daily   • docusate sodium  100 mg Oral BID   • Labetalol HCl  200 mg Oral BID   • Rita Cary

## 2018-12-24 VITALS
WEIGHT: 165.19 LBS | BODY MASS INDEX: 29 KG/M2 | DIASTOLIC BLOOD PRESSURE: 44 MMHG | OXYGEN SATURATION: 94 % | TEMPERATURE: 98 F | RESPIRATION RATE: 18 BRPM | SYSTOLIC BLOOD PRESSURE: 131 MMHG | HEART RATE: 67 BPM

## 2018-12-24 PROCEDURE — 99239 HOSP IP/OBS DSCHRG MGMT >30: CPT | Performed by: HOSPITALIST

## 2018-12-24 RX ORDER — POTASSIUM CHLORIDE 20 MEQ/1
20 TABLET, EXTENDED RELEASE ORAL ONCE
Status: COMPLETED | OUTPATIENT
Start: 2018-12-24 | End: 2018-12-24

## 2018-12-24 RX ORDER — LEVETIRACETAM 1000 MG/1
1000 TABLET ORAL DAILY
Qty: 60 TABLET | Refills: 0 | Status: ON HOLD | OUTPATIENT
Start: 2018-12-24 | End: 2019-06-04

## 2018-12-24 RX ORDER — AMOXICILLIN AND CLAVULANATE POTASSIUM 500; 125 MG/1; MG/1
1 TABLET, FILM COATED ORAL 2 TIMES DAILY
Qty: 14 TABLET | Refills: 0 | Status: SHIPPED | OUTPATIENT
Start: 2018-12-24 | End: 2018-12-31

## 2018-12-24 RX ORDER — LEVETIRACETAM 1000 MG/1
1000 TABLET ORAL DAILY
Qty: 60 TABLET | Refills: 0 | Status: SHIPPED | OUTPATIENT
Start: 2018-12-24 | End: 2018-12-24

## 2018-12-24 NOTE — PROGRESS NOTES
[facility]                INFECTIOUS DISEASE PROGRESS NOTE    Stacy Fish Patient Status:  Inpatient    10/30/1955 MRN [id number]   [facility] 3NE-A Attending Virginie Ureña MD   Hosp Day # 2 PCP Krzysztof Ferreira Result Value Ref Range    Urine Culture Insufficient growth at 18-24 hours N/A           Problem list reviewed:  Patient Active Problem List:     Anemia     Leukocytosis     Azotemia     Metabolic acidosis     Toxic metabolic encephalopathy     Hyperglyc

## 2018-12-24 NOTE — PROGRESS NOTES
[facility] HOSPITALIST  Progress Note     Nereida Howell Patient Status:  Inpatient    10/30/1955 MRN [id number]   [facility] 3NE-A Attending Abdulaziz Mireles MD   Hosp Day # 2 PCP [facility]     Chief Complaint: AMS    S: Patient has no Intravenous Q6H   • meropenem  500 mg Intravenous Q12H   • AmLODIPine Besylate  10 mg Oral Daily   • baclofen  20 mg Oral TID   • escitalopram  5 mg Oral Daily   • docusate sodium  100 mg Oral BID   • Labetalol HCl  200 mg Oral BID   • LORazepam  1 mg Intr

## 2018-12-24 NOTE — PROGRESS NOTES
Patient not able to confirm whether or not she received a flu shot. Unable to reach [facility] to find out. Do not want to risk over vaccinating. Will hold flu shot.

## 2018-12-24 NOTE — PROGRESS NOTES
[id number] Lexi Sandhu Neurology Progress Note    Martha Blood Patient Status:  Inpatient    10/30/1955 MRN [id number]   [facility] 3NE-A Attending Calos Bethea MD   Hosp Day # 2 PCP [facility]     Neurology Attending note    Beryl touch is intact bilaterally. Motor:  No arm weakness noted. Chronic bilateral leg weakness. Finger-to-nose coordination is intact.      Labs:  Lab Results   Component Value Date    PGLU 146 12/24/2018       Diagnostics:  12/23/2018 MRI Brain  No restricted

## 2018-12-24 NOTE — SLP NOTE
SPEECH DAILY NOTE - INPATIENT    ASSESSMENT & PLAN   ASSESSMENT  Pt seen for meal assess/dysphagia therapy to monitor po tolerance of recommended diet and ensure adherence to aspiration precautions. Pt alert and confused. Delayed responses to questions. status    FOLLOW UP  Follow Up Needed: Yes  SLP Follow-up Date: 12/26/18  Number of Visits to Meet Established Goals: (2-3)    Session: 1    If you have any questions, please contact Ángel Chino.  Magen Hernandez M.S.,CCC-SLP  Sebastian Lyons

## 2018-12-24 NOTE — CM/SW NOTE
Case discussed in rounds. Pt may possibly be discharged back to [facility] today. BRAYDON sent updates to [facility] and informed admissions rep of possible d/c. They are ready for patient if d/c'd today.  BRAYDON placed ambulance on \"will call\" through [employer]

## 2018-12-25 NOTE — PROGRESS NOTES
NURSING DISCHARGE NOTE    Discharged Nursing home via Ambulance. Accompanied by Support staff  Belongings Taken by patient/family. Report called to [facility]. Spouse also notified of discharge.

## 2018-12-26 NOTE — DISCHARGE SUMMARY
[facility] HOSPITALIST  DISCHARGE SUMMARY     Haritha Braden Patient Status:  Inpatient    10/30/1955 MRN [id number]   [facility] 3NE-A Attending No att. providers found   Hosp Day # 2 PCP [facility]     Date of Admission: 2018  Da Discharge Medications      START taking these medications      Instructions Prescription details   Amoxicillin-Pot Clavulanate 500-125 MG Tabs  Commonly known as:  AUGMENTIN      Take 1 tablet by mouth 2 (two) times daily for 7 days.    Stop taking on:  1 Crea  Commonly known as:  MYCOSTATIN      Apply 100,000 Units topically 2 (two) times daily as needed. Refills:  0     Polyethylene Glycol 3350 Pack  Commonly known as:  MIRALAX      Take 17 g by mouth daily.    Refills:  0     TAB-A-RAHEEM Tabs      Take 1

## 2019-03-18 ENCOUNTER — APPOINTMENT (OUTPATIENT)
Dept: GENERAL RADIOLOGY | Facility: HOSPITAL | Age: 64
DRG: 682 | End: 2019-03-18
Attending: EMERGENCY MEDICINE
Payer: MEDICARE

## 2019-03-18 ENCOUNTER — HOSPITAL ENCOUNTER (INPATIENT)
Facility: HOSPITAL | Age: 64
LOS: 3 days | Discharge: NURSING, REHAB OR LONG-TERM CARE | DRG: 682 | End: 2019-03-21
Attending: EMERGENCY MEDICINE | Admitting: HOSPITALIST
Payer: MEDICARE

## 2019-03-18 ENCOUNTER — APPOINTMENT (OUTPATIENT)
Dept: CT IMAGING | Facility: HOSPITAL | Age: 64
DRG: 682 | End: 2019-03-18
Attending: EMERGENCY MEDICINE
Payer: MEDICARE

## 2019-03-18 DIAGNOSIS — E87.5 HYPERKALEMIA: ICD-10-CM

## 2019-03-18 DIAGNOSIS — D72.829 LEUKOCYTOSIS, UNSPECIFIED TYPE: ICD-10-CM

## 2019-03-18 DIAGNOSIS — N17.9 ACUTE RENAL FAILURE SUPERIMPOSED ON CHRONIC KIDNEY DISEASE, UNSPECIFIED CKD STAGE, UNSPECIFIED ACUTE RENAL FAILURE TYPE (HCC): Primary | ICD-10-CM

## 2019-03-18 DIAGNOSIS — N18.9 ACUTE RENAL FAILURE SUPERIMPOSED ON CHRONIC KIDNEY DISEASE, UNSPECIFIED CKD STAGE, UNSPECIFIED ACUTE RENAL FAILURE TYPE (HCC): Primary | ICD-10-CM

## 2019-03-18 PROCEDURE — 70450 CT HEAD/BRAIN W/O DYE: CPT | Performed by: EMERGENCY MEDICINE

## 2019-03-18 PROCEDURE — 71045 X-RAY EXAM CHEST 1 VIEW: CPT | Performed by: EMERGENCY MEDICINE

## 2019-03-18 PROCEDURE — 99223 1ST HOSP IP/OBS HIGH 75: CPT | Performed by: HOSPITALIST

## 2019-03-18 RX ORDER — AMLODIPINE BESYLATE 10 MG/1
10 TABLET ORAL DAILY
Status: DISCONTINUED | OUTPATIENT
Start: 2019-03-19 | End: 2019-03-21

## 2019-03-18 RX ORDER — LABETALOL 200 MG/1
200 TABLET, FILM COATED ORAL
Status: DISCONTINUED | OUTPATIENT
Start: 2019-03-18 | End: 2019-03-21

## 2019-03-18 RX ORDER — HEPARIN SODIUM 5000 [USP'U]/ML
5000 INJECTION, SOLUTION INTRAVENOUS; SUBCUTANEOUS EVERY 12 HOURS SCHEDULED
Status: DISCONTINUED | OUTPATIENT
Start: 2019-03-18 | End: 2019-03-20

## 2019-03-18 RX ORDER — DEXTROSE MONOHYDRATE 25 G/50ML
50 INJECTION, SOLUTION INTRAVENOUS ONCE
Status: COMPLETED | OUTPATIENT
Start: 2019-03-18 | End: 2019-03-18

## 2019-03-18 RX ORDER — RISPERIDONE 0.5 MG/1
0.5 TABLET, FILM COATED ORAL 2 TIMES DAILY
Status: DISCONTINUED | OUTPATIENT
Start: 2019-03-18 | End: 2019-03-21

## 2019-03-18 RX ORDER — DEXTROSE MONOHYDRATE 25 G/50ML
50 INJECTION, SOLUTION INTRAVENOUS
Status: DISCONTINUED | OUTPATIENT
Start: 2019-03-18 | End: 2019-03-21

## 2019-03-18 RX ORDER — SODIUM CHLORIDE 9 MG/ML
INJECTION, SOLUTION INTRAVENOUS CONTINUOUS
Status: CANCELLED | OUTPATIENT
Start: 2019-03-18 | End: 2019-03-18

## 2019-03-18 RX ORDER — BACLOFEN 10 MG/1
20 TABLET ORAL 3 TIMES DAILY
Status: DISCONTINUED | OUTPATIENT
Start: 2019-03-18 | End: 2019-03-21

## 2019-03-18 RX ORDER — ACETAMINOPHEN 325 MG/1
650 TABLET ORAL EVERY 6 HOURS PRN
Status: DISCONTINUED | OUTPATIENT
Start: 2019-03-18 | End: 2019-03-21

## 2019-03-18 RX ORDER — LEVETIRACETAM 500 MG/1
1000 TABLET ORAL DAILY
Status: DISCONTINUED | OUTPATIENT
Start: 2019-03-19 | End: 2019-03-21

## 2019-03-18 RX ORDER — ESCITALOPRAM OXALATE 10 MG/1
10 TABLET ORAL DAILY
Status: DISCONTINUED | OUTPATIENT
Start: 2019-03-19 | End: 2019-03-21

## 2019-03-18 RX ORDER — RISPERIDONE 0.5 MG/1
0.5 TABLET, FILM COATED ORAL 2 TIMES DAILY
COMMUNITY

## 2019-03-18 RX ORDER — ONDANSETRON 2 MG/ML
4 INJECTION INTRAMUSCULAR; INTRAVENOUS EVERY 6 HOURS PRN
Status: DISCONTINUED | OUTPATIENT
Start: 2019-03-18 | End: 2019-03-21

## 2019-03-18 RX ORDER — CALCIUM ACETATE 667 MG/1
1 CAPSULE ORAL 2 TIMES DAILY
Status: DISCONTINUED | OUTPATIENT
Start: 2019-03-18 | End: 2019-03-21

## 2019-03-18 RX ORDER — SODIUM CHLORIDE 9 MG/ML
INJECTION, SOLUTION INTRAVENOUS CONTINUOUS
Status: DISCONTINUED | OUTPATIENT
Start: 2019-03-18 | End: 2019-03-19

## 2019-03-18 NOTE — ED NOTES
Nurse to Nurse report called, [id number]. Pt okay to floor per Dr. Alfredito Palomares. Okay to end Stroke Documentation per Dr. Alfredito Palomares. Pt transport ordered.

## 2019-03-18 NOTE — H&P
[facility] HOSPITALIST  History and Physical     Lawrance Shy Patient Status:  Emergency    10/30/1955 MRN [id number]   Location [address] [facility] Attending Charis Morrow MD   Hosp Day # 0 PCP [facility]     Chief Complaint: total) by mouth 3 (three) times daily. Disp: 90 tablet Rfl: 0   TraZODone HCl 50 MG Oral Tab Take 50 mg by mouth nightly as needed for Sleep. Disp:  Rfl:    calcium acetate 667 MG Oral Cap Take 1 capsule by mouth 2 (two) times daily.  Disp:  Rfl:    Multipl rashes or lesions. Psychiatric: Appropriate mood and affect.       Diagnostic Data:      Labs:  Recent Labs   Lab  03/18/19   1111   WBC  20.3*   HGB  8.7*   MCV  93.0   PLT  319.0       Recent Labs   Lab  03/18/19   1111   GLU  109*   BUN  70*   CREATSER

## 2019-03-18 NOTE — ED INITIAL ASSESSMENT (HPI)
Pt presented from NH via EMS c/o unresponsive  On arrival to NH pt  Noted to have left side facial droop now resolved pt left arm contracted which is new for the patient. Pt glucose on EMS arrival to NH was 110, in route to ED gluc 100.   Pt responds to n

## 2019-03-18 NOTE — ED PROVIDER NOTES
Patient Seen in: [facility] Emergency Department    History   Patient presents with:  Stroke (neurologic)    Stated Complaint: Stroke    HPI    Patient is a 72-year-old female nursing home resident with a history of hypertension, diabetes, seizure d 134/63   Pulse 64   Temp 97.2 °F (36.2 °C) (Temporal)   Resp 13   Ht 162.6 cm (5' 4\")   Wt 74.9 kg   SpO2 100%   BMI 28.34 kg/m²         Physical Exam   Constitutional: She appears well-developed and well-nourished.    HENT:   Head: Normocephalic and atrau POC Glucose 114 (*)     All other components within normal limits   CBC W/ DIFFERENTIAL - Abnormal; Notable for the following components:    WBC 20.3 (*)     RBC 3.02 (*)     HGB 8.7 (*)     HCT 28.1 (*)     RDW 16.0 (*)     RDW-SD 54.6 (*)     Neutrophil 59-year-old female nursing home resident, presenting for possible stroke.   Report from paramedics is that staff found her \"unresponsive\" about 9 AM.  By the time the paramedics arrived, she was starting to wake up, noted facial droop and some left arm insulin/D50/calcium. She has a leukocytosis as well. No evidence of pneumonia on chest x-ray. Suspect she has a UTI which has been a recurrent problem for which she has been admitted in the past.  Chatman catheter was placed, no urine sample yet.   Continu

## 2019-03-18 NOTE — PLAN OF CARE
Pt admitted from ER at 1420 hours, Pt was awake and alert upon arrival. Oriented x 1, able to answer some question but pt is confused. Pt is little Lethargic. Wound care done. Pt has rash all folds and on the back, periarea.  There is wound present on the Lt

## 2019-03-19 ENCOUNTER — APPOINTMENT (OUTPATIENT)
Dept: ULTRASOUND IMAGING | Facility: HOSPITAL | Age: 64
DRG: 682 | End: 2019-03-19
Attending: INTERNAL MEDICINE
Payer: MEDICARE

## 2019-03-19 PROCEDURE — 99223 1ST HOSP IP/OBS HIGH 75: CPT | Performed by: INTERNAL MEDICINE

## 2019-03-19 PROCEDURE — 76770 US EXAM ABDO BACK WALL COMP: CPT | Performed by: INTERNAL MEDICINE

## 2019-03-19 PROCEDURE — 99232 SBSQ HOSP IP/OBS MODERATE 35: CPT | Performed by: HOSPITALIST

## 2019-03-19 RX ORDER — SODIUM CHLORIDE 450 MG/100ML
INJECTION, SOLUTION INTRAVENOUS CONTINUOUS
Status: DISCONTINUED | OUTPATIENT
Start: 2019-03-19 | End: 2019-03-19

## 2019-03-19 RX ORDER — SODIUM POLYSTYRENE SULFONATE 4.1 MEQ/G
30 POWDER, FOR SUSPENSION ORAL; RECTAL ONCE
Status: COMPLETED | OUTPATIENT
Start: 2019-03-19 | End: 2019-03-19

## 2019-03-19 RX ORDER — CLOTRIMAZOLE AND BETAMETHASONE DIPROPIONATE 10; .64 MG/G; MG/G
CREAM TOPICAL 2 TIMES DAILY
Status: DISCONTINUED | OUTPATIENT
Start: 2019-03-19 | End: 2019-03-21

## 2019-03-19 RX ORDER — SODIUM POLYSTYRENE SULFONATE 4.1 MEQ/G
15 POWDER, FOR SUSPENSION ORAL; RECTAL ONCE
Status: COMPLETED | OUTPATIENT
Start: 2019-03-19 | End: 2019-03-19

## 2019-03-19 RX ORDER — NICOTINE 21 MG/24HR
1 PATCH, TRANSDERMAL 24 HOURS TRANSDERMAL DAILY
Status: DISCONTINUED | OUTPATIENT
Start: 2019-03-19 | End: 2019-03-21

## 2019-03-19 RX ORDER — DEXTROSE MONOHYDRATE 25 G/50ML
50 INJECTION, SOLUTION INTRAVENOUS AS NEEDED
Status: DISCONTINUED | OUTPATIENT
Start: 2019-03-19 | End: 2019-03-21

## 2019-03-19 NOTE — PLAN OF CARE
SKIN/TISSUE INTEGRITY - ADULT    • Incision(s), wounds(s) or drain site(s) healing without S/S of infection Not Progressing          GASTROINTESTINAL - ADULT    • Maintains or returns to baseline bowel function Progressing    • Maintains adequate nutrition

## 2019-03-19 NOTE — OCCUPATIONAL THERAPY NOTE
OT orders received, chart reviewed. Per notes, pt is a resident at [address] [place] [address] and dependent for all ADL's, dependent for mobility with use of total lift for transfers. Will dc from our services as pt is not appropriate for skilled IPOT at this time.

## 2019-03-19 NOTE — CONSULTS
[facility]  Report of Consultation    Madison Kamilla Patient Status:  Inpatient    10/30/1955 MRN [id number]   [facility] 4NW-A Attending Rubi Perez MD   Hosp Day # 1 PCP [facility]     Reason for Consultation:  AHSAN/CKD  Hyp dextrose 50 % injection 50 mL, 50 mL, Intravenous, PRN  •  nicotine (NICODERM CQ) 14 MG/24HR 1 patch, 1 patch, Transdermal, Daily  •  clotrimazole-betamethasone (LOTRISONE) cream, , Topical, BID  •  sodium bicarbonate 75 mEq in sodium chloride 0.45 % 1,000 bruits. Respiratory: Clear to auscultation bilaterally. No wheezes. No rhonchi.   Cardiovascular: S1, S2.  Regular rate and rythmn  Abdomen: Soft, ND; + BS  Neurologic: moves all extremities; decreased strength of BLE   Musculoskeletal: + dependent edema;

## 2019-03-19 NOTE — PLAN OF CARE
Pt confused and forgetful alert x2. Pt constantly asking to go outside and smoke. Nicotine patch on left arm applied this am. Potassium elevated this am 6.1 orders recd repeat level at 1400 5.9 order recd for renal consult Dr Guillermo brunner.  Pt baseline is t

## 2019-03-19 NOTE — CM/SW NOTE
03/19/19 1500   CM/SW Referral Data   Referral Source Social Work (self-referral)   Reason for Referral Discharge planning   Informant Patient   Patient Info   Patient's Mental Status Alert;Oriented   Patient's [facility]

## 2019-03-19 NOTE — PHYSICAL THERAPY NOTE
PT orders rec'd via fall risk protocol, chart reviewed. Per notes, pt is a resident at [address] [place] [address] and dependent for all ADL's, dependent for mobility with use of total lift for transfers.  Will dc from our services as pt is not appropriate for skilled IPPT at this

## 2019-03-19 NOTE — PLAN OF CARE
Pt  at bedside he is requesting information about POA paperwork states he signed POA paperwork during pt's last admission. However Pt has a state appointed guardian listed in the system. Will consult social work for this issue.

## 2019-03-19 NOTE — PROGRESS NOTES
[facility] H[facility]PITAL[facility]  Progress Note     Mort Plate Patient Status:  Inpatient    10/30/1955 MRN [id number]   [facility] 4NW-A Attending Bettina Orozco MD   Hosp Day # 1 PCP [facility]     Chief Complaint: AMS    S: Patient improved Imaging data reviewed in Epic.     Medications:   • nicotine  1 patch Transdermal Daily   • Heparin Sodium (Porcine)  5,000 Units Subcutaneous 2 times per day   • Insulin Aspart Pen  1-10 Units Subcutaneous TID AC and HS   • amLODIPine Besylate  10 mg Oral

## 2019-03-19 NOTE — SLP NOTE
ADULT SWALLOWING EVALUATION    ASSESSMENT    ASSESSMENT/OVERALL IMPRESSION:  Pt seen this PM for bedside evaluation. Pt admitted to hospital from NH due to unresponsiveness. Concern for CVA initially upon admission.  No CVA diagnosis and pt diagnosed with e for Referral: R/O aspiration    Problem List  Principal Problem:    Acute renal failure superimposed on chronic kidney disease, unspecified CKD stage, unspecified acute renal failure type ([facility] [address]  Active Problems:    Hyperkalemia    Leukocytosis, unspecified intact  Laryngeal Elevation Coordination: Appears intact  (Please note: Silent aspiration cannot be evaluated clinically.  Videofluoroscopic Swallow Study is required to rule-out silent aspiration.)    Esophageal Phase of Swallow: No complaints consistent w

## 2019-03-19 NOTE — CONSULTS
[facility]  Report of Inpatient Wound Care Consultation    [place] Patient Status:  Inpatient    10/30/1955 MRN [id number]   [facility] 4NW-A Attending Jericho Muniz MD   Hosp Day # 1 PCP [facility]     Reason for [facility] interval not displayed. ASSESSMENT:  Patient is currently on hospital specialty bed, being repositioned frequently, moisture,  nutrition and pressure redistribution is being managed.    Pt with macular papular rash to skin folds/buttocks and very chroni

## 2019-03-19 NOTE — DIETARY NOTE
[address]     Admitting diagnosis:  Hyperkalemia [E87.5]  Leukocytosis, unspecified type [D72.829]  Acute renal failure superimposed on chronic kidney disease, unspecified CKD stage, unspecified acute renal

## 2019-03-20 PROCEDURE — 99233 SBSQ HOSP IP/OBS HIGH 50: CPT | Performed by: INTERNAL MEDICINE

## 2019-03-20 PROCEDURE — 99232 SBSQ HOSP IP/OBS MODERATE 35: CPT | Performed by: HOSPITALIST

## 2019-03-20 RX ORDER — SODIUM POLYSTYRENE SULFONATE 4.1 MEQ/G
30 POWDER, FOR SUSPENSION ORAL; RECTAL ONCE
Status: COMPLETED | OUTPATIENT
Start: 2019-03-20 | End: 2019-03-20

## 2019-03-20 RX ORDER — MAGNESIUM OXIDE 400 MG (241.3 MG MAGNESIUM) TABLET
400 TABLET ONCE
Status: COMPLETED | OUTPATIENT
Start: 2019-03-20 | End: 2019-03-20

## 2019-03-20 NOTE — CM/SW NOTE
SW reviewed pt's chart. BRAYDON called Mike Govea  who confirmed he is the pt's guardian. Guardianship paperwork to be faxed to SW later today.  Pt appears to be alert and oriented, but is not able to make personal/ finacial decisions for herself

## 2019-03-20 NOTE — PROGRESS NOTES
[facility]  Nephrology Progress Note    Lucia Bernal Attending:  Priyank Farris MD       Assessment and Plan:    1) CKD 4- baseline Cr > 2 mg/dl due to longstanding DM / HTN; previous eval for other etiologies unrevealing- no further w/u    2)  03/20/2019    CO2 23.0 03/20/2019     03/20/2019    CA 8.1 03/20/2019    MG 1.6 03/20/2019    CK 30 03/19/2019    PGLU 114 03/20/2019       Imaging: All imaging studies reviewed.     Meds:     Current Facility-Administered Medications:  jessicar

## 2019-03-20 NOTE — PROGRESS NOTES
[facility] H[facility]PITAL[facility]  Progress Note     Karen Pro Patient Status:  Inpatient    10/30/1955 MRN [id number]   [facility] 4NW-A Attending Seth Mercado MD   Hosp Day # 2 PCP [facility]     Chief Complaint: AMS    S: Patient without --    --    BILT  0.2   --    --    --    --    TP  6.1*   --    --    --    --        Estimated Creatinine Clearance: 16.2 mL/min (A) (based on SCr of 3.14 mg/dL (H)). No results for input(s): PTP, INR in the last 168 hours.     Recent Labs   Lab  03/1

## 2019-03-20 NOTE — PLAN OF CARE
Maintains or returns to baseline bowel function Progressing      Absence of urinary retention Progressing      Absence of fever/infection during anticipated neutropenic period Progressing      Free from fall injury Progressing    Alert, slow speech at time

## 2019-03-20 NOTE — SLP NOTE
SPEECH DAILY NOTE - INPATIENT    ASSESSMENT & PLAN   ASSESSMENT  Pt seen at bedside this AM for speech therapy services. Pt cooperative, pleasant, and alert. Per RN documentation, pt tolerating diet well with no concerns of aspiration.  Trial thin liquids v

## 2019-03-21 VITALS
BODY MASS INDEX: 28.19 KG/M2 | OXYGEN SATURATION: 96 % | WEIGHT: 165.13 LBS | RESPIRATION RATE: 18 BRPM | SYSTOLIC BLOOD PRESSURE: 158 MMHG | HEIGHT: 64 IN | TEMPERATURE: 98 F | DIASTOLIC BLOOD PRESSURE: 67 MMHG | HEART RATE: 64 BPM

## 2019-03-21 PROCEDURE — 99239 HOSP IP/OBS DSCHRG MGMT >30: CPT | Performed by: HOSPITALIST

## 2019-03-21 PROCEDURE — 99232 SBSQ HOSP IP/OBS MODERATE 35: CPT | Performed by: INTERNAL MEDICINE

## 2019-03-21 RX ORDER — SODIUM BICARBONATE 325 MG/1
650 TABLET ORAL 2 TIMES DAILY
Status: DISCONTINUED | OUTPATIENT
Start: 2019-03-21 | End: 2019-03-21

## 2019-03-21 RX ORDER — SODIUM BICARBONATE 650 MG/1
650 TABLET ORAL 2 TIMES DAILY
Qty: 60 TABLET | Refills: 11 | Status: ON HOLD | OUTPATIENT
Start: 2019-03-21 | End: 2019-12-19

## 2019-03-21 RX ORDER — MAGNESIUM OXIDE 400 MG (241.3 MG MAGNESIUM) TABLET
400 TABLET ONCE
Status: DISCONTINUED | OUTPATIENT
Start: 2019-03-21 | End: 2019-03-21

## 2019-03-21 NOTE — PROGRESS NOTES
[facility]PITAL[facility]  Progress Note     Evon Ho Patient Status:  Inpatient    10/30/1955 MRN [id number]   [facility] 4NW-A Attending Twin Christopher MD   Hosp Day # 3 PCP [facility]     Chief Complaint: AMS    S: Patient without CO2  23.0   < >  18.0*   --   23.0   --   23.0   ALKPHO  80   --    --    --    --    --    --    AST  8*   --    --    --    --    --    --    ALT  14   --    --    --    --    --    --    BILT  0.2   --    --    --    --    --    --    TP  6.1*   --

## 2019-03-21 NOTE — PROGRESS NOTES
[facility]  Nephrology Progress Note    Haritha Braden Attending:  Memo Regan MD       Assessment and Plan:    1) CKD 4- baseline Cr > 2 mg/dl due to longstanding DM / HTN; previous eval for other etiologies unrevealing- no further w/u    2) 03/21/2019    K 4.9 03/21/2019     03/21/2019    CO2 23.0 03/21/2019     03/21/2019    CA 7.8 03/21/2019    MG 1.6 03/21/2019    PGLU 134 03/21/2019       Imaging: All imaging studies reviewed.     Meds:     Current Facility-Administered Medic

## 2019-03-21 NOTE — PLAN OF CARE
CARDIOVASCULAR - ADULT    • Maintains optimal cardiac output and hemodynamic stability Progressing        GASTROINTESTINAL - ADULT    • Maintains or returns to baseline bowel function Progressing    • Maintains adequate nutritional intake (undernourished) should be the POA\". Pt  requesting POA form. Informed pt  that social work is here during the day and he could speak with them during the day tomorrow if pt is still here. Pt needs met at this time. Will continue to monitor.

## 2019-03-21 NOTE — CM/SW NOTE
03/21/19 1100   Discharge disposition   Expected discharge disposition Skilled Nurs   Name of [facility]   Discharge transportation [facility] will transfer at [address] [facility] today.  Pt is aware and agreeable

## 2019-03-22 NOTE — DISCHARGE SUMMARY
[facility] HOSPITALIST  DISCHARGE SUMMARY     Nelson Sparks Patient Status:  Inpatient    10/30/1955 MRN [id number]   [facility] 4NW-A Attending No att. providers found   Hosp Day # 3 PCP [facility]     Date of Admission: 3/18/2019  Deep hours as needed for Pain. Refills:  0     amLODIPine Besylate 5 MG Tabs  Commonly known as:  NORVASC      Take 10 mg by mouth daily.    Refills:  0     baclofen 20 MG Tabs  Commonly known as:  LIORESAL      Take 1 tablet (20 mg total) by mouth 3 (three) t [address] [address]  [phone]    In 1 week      Appointments for Next 30 Days 3/22/2019 - 4/21/2019    None          Vital signs:       Physical Exam:    General: No acute distress.    Respiratory: Clear to auscultation bilateral

## 2019-03-22 NOTE — PLAN OF CARE
Maintains or returns to baseline bowel function Adequate for Discharge      Absence of urinary retention Adequate for Discharge      Electrolytes maintained within normal limits Adequate for Discharge      Absence of fever/infection during anticipated neut

## 2019-03-26 NOTE — CDS QUERY
Encephalopathy  Esther Bonilla   Dear Dr. Marlon Castellano:  Clinical information (provided below) documents a diagnosis of acute encephalopathy.  For accurate ICD-10-CM code assignment to reflect severity of illness and risk of mortality,

## 2019-04-05 ENCOUNTER — APPOINTMENT (OUTPATIENT)
Dept: GENERAL RADIOLOGY | Facility: HOSPITAL | Age: 64
DRG: 071 | End: 2019-04-05
Payer: MEDICARE

## 2019-04-05 ENCOUNTER — HOSPITAL ENCOUNTER (INPATIENT)
Facility: HOSPITAL | Age: 64
LOS: 4 days | Discharge: NURSING, REHAB OR LONG-TERM CARE | DRG: 071 | End: 2019-04-09
Attending: EMERGENCY MEDICINE | Admitting: HOSPITALIST
Payer: MEDICARE

## 2019-04-05 ENCOUNTER — APPOINTMENT (OUTPATIENT)
Dept: CT IMAGING | Facility: HOSPITAL | Age: 64
DRG: 071 | End: 2019-04-05
Attending: EMERGENCY MEDICINE
Payer: MEDICARE

## 2019-04-05 DIAGNOSIS — N18.9 ACUTE RENAL FAILURE SUPERIMPOSED ON CHRONIC KIDNEY DISEASE, UNSPECIFIED CKD STAGE, UNSPECIFIED ACUTE RENAL FAILURE TYPE (HCC): ICD-10-CM

## 2019-04-05 DIAGNOSIS — N17.9 ACUTE RENAL FAILURE SUPERIMPOSED ON CHRONIC KIDNEY DISEASE, UNSPECIFIED CKD STAGE, UNSPECIFIED ACUTE RENAL FAILURE TYPE (HCC): ICD-10-CM

## 2019-04-05 DIAGNOSIS — R41.82 ALTERED MENTAL STATUS, UNSPECIFIED ALTERED MENTAL STATUS TYPE: Primary | ICD-10-CM

## 2019-04-05 DIAGNOSIS — E87.5 HYPERKALEMIA: ICD-10-CM

## 2019-04-05 DIAGNOSIS — B37.2 YEAST DERMATITIS: ICD-10-CM

## 2019-04-05 PROCEDURE — 71045 X-RAY EXAM CHEST 1 VIEW: CPT

## 2019-04-05 PROCEDURE — 99222 1ST HOSP IP/OBS MODERATE 55: CPT | Performed by: INTERNAL MEDICINE

## 2019-04-05 PROCEDURE — 99223 1ST HOSP IP/OBS HIGH 75: CPT | Performed by: HOSPITALIST

## 2019-04-05 PROCEDURE — 70450 CT HEAD/BRAIN W/O DYE: CPT | Performed by: EMERGENCY MEDICINE

## 2019-04-05 RX ORDER — ESCITALOPRAM OXALATE 10 MG/1
10 TABLET ORAL EVERY MORNING
Status: DISCONTINUED | OUTPATIENT
Start: 2019-04-06 | End: 2019-04-09

## 2019-04-05 RX ORDER — HEPARIN SODIUM 5000 [USP'U]/ML
5000 INJECTION, SOLUTION INTRAVENOUS; SUBCUTANEOUS EVERY 12 HOURS SCHEDULED
Status: ON HOLD | COMMUNITY
End: 2019-12-10

## 2019-04-05 RX ORDER — SODIUM CHLORIDE 450 MG/100ML
INJECTION, SOLUTION INTRAVENOUS CONTINUOUS
Status: DISCONTINUED | OUTPATIENT
Start: 2019-04-05 | End: 2019-04-06

## 2019-04-05 RX ORDER — PREDNISONE 20 MG/1
40 TABLET ORAL DAILY
Status: ON HOLD | COMMUNITY
Start: 2019-04-04 | End: 2019-04-09

## 2019-04-05 RX ORDER — HYDRALAZINE HYDROCHLORIDE 20 MG/ML
10 INJECTION INTRAMUSCULAR; INTRAVENOUS EVERY 6 HOURS PRN
Status: DISCONTINUED | OUTPATIENT
Start: 2019-04-05 | End: 2019-04-09

## 2019-04-05 RX ORDER — HALOPERIDOL 5 MG/ML
1 INJECTION INTRAMUSCULAR EVERY 4 HOURS PRN
Status: DISCONTINUED | OUTPATIENT
Start: 2019-04-05 | End: 2019-04-09

## 2019-04-05 RX ORDER — LEVETIRACETAM 500 MG/1
1000 TABLET ORAL DAILY
Status: DISCONTINUED | OUTPATIENT
Start: 2019-04-06 | End: 2019-04-09

## 2019-04-05 RX ORDER — DOCUSATE SODIUM 100 MG/1
100 CAPSULE, LIQUID FILLED ORAL 2 TIMES DAILY
Status: DISCONTINUED | OUTPATIENT
Start: 2019-04-05 | End: 2019-04-09

## 2019-04-05 RX ORDER — HALOPERIDOL 1 MG/1
0.5 TABLET ORAL EVERY 4 HOURS PRN
Status: DISCONTINUED | OUTPATIENT
Start: 2019-04-05 | End: 2019-04-09

## 2019-04-05 RX ORDER — DEXTROSE MONOHYDRATE 25 G/50ML
50 INJECTION, SOLUTION INTRAVENOUS
Status: DISCONTINUED | OUTPATIENT
Start: 2019-04-05 | End: 2019-04-09

## 2019-04-05 RX ORDER — HEPARIN SODIUM 5000 [USP'U]/ML
5000 INJECTION, SOLUTION INTRAVENOUS; SUBCUTANEOUS EVERY 12 HOURS SCHEDULED
Status: CANCELLED | OUTPATIENT
Start: 2019-04-05

## 2019-04-05 RX ORDER — BACLOFEN 10 MG/1
20 TABLET ORAL 3 TIMES DAILY
Status: DISCONTINUED | OUTPATIENT
Start: 2019-04-05 | End: 2019-04-09

## 2019-04-05 RX ORDER — SODIUM BICARBONATE 325 MG/1
650 TABLET ORAL 2 TIMES DAILY
Status: DISCONTINUED | OUTPATIENT
Start: 2019-04-05 | End: 2019-04-09

## 2019-04-05 RX ORDER — CALCIUM ACETATE 667 MG/1
1 CAPSULE ORAL 2 TIMES DAILY WITH MEALS
Status: DISCONTINUED | OUTPATIENT
Start: 2019-04-05 | End: 2019-04-09

## 2019-04-05 RX ORDER — FLUCONAZOLE 100 MG/1
200 TABLET ORAL ONCE
Status: COMPLETED | OUTPATIENT
Start: 2019-04-05 | End: 2019-04-05

## 2019-04-05 RX ORDER — RISPERIDONE 0.25 MG/1
0.5 TABLET, FILM COATED ORAL 2 TIMES DAILY
Status: DISCONTINUED | OUTPATIENT
Start: 2019-04-05 | End: 2019-04-09

## 2019-04-05 RX ORDER — DEXTROSE MONOHYDRATE 25 G/50ML
50 INJECTION, SOLUTION INTRAVENOUS ONCE
Status: COMPLETED | OUTPATIENT
Start: 2019-04-05 | End: 2019-04-05

## 2019-04-05 RX ORDER — AMLODIPINE BESYLATE 5 MG/1
5 TABLET ORAL DAILY
Status: DISCONTINUED | OUTPATIENT
Start: 2019-04-05 | End: 2019-04-09

## 2019-04-05 RX ORDER — POLYETHYLENE GLYCOL 3350 17 G/17G
17 POWDER, FOR SOLUTION ORAL DAILY
Status: DISCONTINUED | OUTPATIENT
Start: 2019-04-05 | End: 2019-04-09

## 2019-04-05 RX ORDER — LABETALOL 200 MG/1
200 TABLET, FILM COATED ORAL 2 TIMES DAILY
Status: DISCONTINUED | OUTPATIENT
Start: 2019-04-05 | End: 2019-04-09

## 2019-04-05 RX ORDER — SULFAMETHOXAZOLE AND TRIMETHOPRIM 800; 160 MG/1; MG/1
1 TABLET ORAL 2 TIMES DAILY
Status: ON HOLD | COMMUNITY
Start: 2019-04-04 | End: 2019-04-09

## 2019-04-05 NOTE — H&P
[facility] H[facility]PITALIST  History and Physical     Saba Vincent Patient Status:  Emergency    10/30/1955 MRN [id number]   Location [address] [facility] Attending Jerrod Coyle MD   Hosp Day # 0 PCP [facility]     Chief Complaint: Take 40 mg by mouth daily. Disp:  Rfl:    nystatin-triamcinolone 580910-3.8 UNIT/GM-% External Cream Apply topically 2 (two) times daily. Disp:  Rfl:    sodium bicarbonate 650 MG Oral Tab Take 1 tablet (650 mg total) by mouth 2 (two) times daily.  Disp: 60 Clear to auscultation bilaterally. No wheezes. No rhonchi. Cardiovascular: S1, S2. Regular rate and rhythm. No murmurs, no rubs or gallops. Equal pulses. Chest and Back: No tenderness or deformity. Abdomen: Soft, nontender, nondistended.   Positive emy

## 2019-04-05 NOTE — CONSULTS
[facility]  Report of Consultation    Simone Mejia Patient Status:  Emergency    10/30/1955 MRN [id number]   Location [address] [facility] Attending Balbina Coyle MD   Hosp Day # 0 PCP [facility]     Reason for [facility] topically 2 (two) times daily. sodium bicarbonate 650 MG Oral Tab Take 1 tablet (650 mg total) by mouth 2 (two) times daily. risperiDONE 0.5 MG Oral Tab Take 0.5 mg by mouth 2 (two) times daily.    levetiracetam 1000 MG Oral Tab Take 1 tablet (1,000 mg wheezes, rales, rhonchi. Abdomen: Soft, non-tender. + bowel sounds, no palpable organomegaly  Extremities: Without clubbing, cyanosis or edema.   Neurologic: Alertt, moving all extremities  Skin: noted to have erythema/fungal appearing rash over lower ab not hesitate to call with any questions or concerns.        Art Evangelista  4/5/2019  4:42 PM

## 2019-04-05 NOTE — ED PROVIDER NOTES
Patient Seen in: [facility] Emergency Department    History   Patient presents with:  Altered Mental Status (neurologic)    Stated Complaint: AMS    HPI    The patient is a 33-year-old female with a history of hypertension, diabetes, COPD, metabolic able to intermittently say yes or no to some questions. Also able to answer her name, otherwise unable to answer any orientation questions. Neuro: No facial droop.   Limited neuro exam, but she has what appears to be a chronically contracted left hand, an HGB 8.1 (*)     HCT 25.9 (*)     RDW 15.4 (*)     RDW-SD 52.9 (*)     All other components within normal limits   LACTIC ACID, PLASMA - Normal   CBC WITH DIFFERENTIAL WITH PLATELET    Narrative:      The following orders were created for panel order CBC Data Registry)     which includes the Dose Index     Registry. PATIENT STATED HISTORY: (As transcribed by Technologist)  Confusion and     weakness. No additional history at this time.                FINDINGS:  Asymmetric positioning in the head sca with acute on chronic renal failure. Also has hyperkalemia. No evidence of hyperkalemic changes on her EKG. She was given insulin, D50 and sodium bicarbonate. Dr. Mimi Pierce of nephrology involved to the patient in the ED.   Remainder of her workup is re

## 2019-04-05 NOTE — ED INITIAL ASSESSMENT (HPI)
Patient to ED from [facility] by EMS for AMS today. Last known well time unknown. Patient with history of CVA and urosepsis.

## 2019-04-05 NOTE — ED NOTES
Pt noted to be continually removing her gown. Pt re-directed and gown re-placed. Pt urinated on self. Pt cleansed with washcloth and water. new bedding and chux placed. Pt left open to air d/t extensive fungal infection in groin/back area.  Pt given distrac

## 2019-04-06 PROCEDURE — 99233 SBSQ HOSP IP/OBS HIGH 50: CPT | Performed by: HOSPITALIST

## 2019-04-06 PROCEDURE — 99232 SBSQ HOSP IP/OBS MODERATE 35: CPT | Performed by: INTERNAL MEDICINE

## 2019-04-06 RX ORDER — DEXTROSE MONOHYDRATE 25 G/50ML
50 INJECTION, SOLUTION INTRAVENOUS AS NEEDED
Status: DISCONTINUED | OUTPATIENT
Start: 2019-04-06 | End: 2019-04-09

## 2019-04-06 RX ORDER — DEXTROSE AND SODIUM CHLORIDE 5; .45 G/100ML; G/100ML
INJECTION, SOLUTION INTRAVENOUS CONTINUOUS
Status: DISCONTINUED | OUTPATIENT
Start: 2019-04-06 | End: 2019-04-07

## 2019-04-06 RX ORDER — ALBUTEROL SULFATE 2.5 MG/3ML
5 SOLUTION RESPIRATORY (INHALATION) ONCE
Status: COMPLETED | OUTPATIENT
Start: 2019-04-06 | End: 2019-04-06

## 2019-04-06 RX ORDER — SODIUM POLYSTYRENE SULFONATE 4.1 MEQ/G
30 POWDER, FOR SUSPENSION ORAL; RECTAL ONCE
Status: COMPLETED | OUTPATIENT
Start: 2019-04-06 | End: 2019-04-06

## 2019-04-06 RX ORDER — DEXTROSE MONOHYDRATE 25 G/50ML
50 INJECTION, SOLUTION INTRAVENOUS ONCE
Status: COMPLETED | OUTPATIENT
Start: 2019-04-06 | End: 2019-04-06

## 2019-04-06 NOTE — PLAN OF CARE
Delirium    • Minimize duration of delirium Progressing        SAFETY ADULT - FALL    • Free from fall injury Progressing        Assumed patient care at 2100. A&Ox1. Confused. Denies pain. Room air. Tele, NSR. Elevated BP on admission, MD paged.  Prn Hydr

## 2019-04-06 NOTE — PROGRESS NOTES
[facility]  Nephrology Progress Note    Abeba Citizen Patient Status:  Inpatient    10/30/1955 MRN [id number]   [facility] 3NE-A Attending Olga Singleton MD   Hosp Day # 1 PCP [facility]       SUBJECTIVE:  No acute events Facility-Administered Medications:  Insulin Regular Human (NOVOLIN R) 100 UNIT/ML injection 10 Units 10 Units Intravenous Once   dextrose 50 % injection 50 mL 50 mL Intravenous PRN   sodium bicarbonate injection 50 mEq 50 mEq Intravenous Once   0.45% NaCl Hypernatremia- due to free water deficit. Better today and will cont 1/2 NS and follow     #4. Skin rash- appears to be candidal infection. Per primary service     #5.  AMS/hx stroke- somewhat better today, may be at baseline        Jayant Vasquez  4/6/

## 2019-04-06 NOTE — PROGRESS NOTES
[facility] H[facility]PITAL[facility]  Progress Note     Elma Barron Patient Status:  Inpatient    10/30/1955 MRN [id number]   [place] 3NE-A Attending Royal Mili MD   Hosp Day # 1 PCP [facility]     Chief Complaint: confusion    S: Patient i baclofen  20 mg Oral TID   • levetiracetam  1,000 mg Oral Daily   • risperiDONE  0.5 mg Oral BID   • sodium bicarbonate  650 mg Oral BID   • nystatin-triamcinolone   Topical QID   • Insulin Aspart Pen  1-10 Units Subcutaneous TID AC and HS       ASSESSMENT

## 2019-04-06 NOTE — PROGRESS NOTES
NURSING ADMISSION NOTE      Patient admitted via Cart  Oriented to room. Safety precautions initiated. Bed in low position. Call light in reach. Confused. A&Ox1. Generalized rash noted. Pulling and tugging on things. Denies pain.  Admission naviga

## 2019-04-06 NOTE — PLAN OF CARE
Assumed care of patient at 0700. Patient A & O x 3-4 this morning, correctly stating name & birthday, location, month, & president. Delayed responses. Patient refusing risperdal & lexapro this morning. Will continue to closely monitor mood.   L side possi needs  - Identify cognitive and physical deficits and behaviors that affect risk of falls.   - [place] fall precautions as indicated by assessment.  - Educate pt/family on patient safety including physical limitations  - Instruct pt to call for assistance improved or at baseline  Description  INTERVENTIONS:  - Assess for possible contributors to thought disturbance, including medications, impaired vision or hearing, underlying metabolic abnormalities, dehydration, psychiatric diagnoses, and notify attending

## 2019-04-07 ENCOUNTER — APPOINTMENT (OUTPATIENT)
Dept: ULTRASOUND IMAGING | Facility: HOSPITAL | Age: 64
DRG: 071 | End: 2019-04-07
Attending: INTERNAL MEDICINE
Payer: MEDICARE

## 2019-04-07 PROCEDURE — 99232 SBSQ HOSP IP/OBS MODERATE 35: CPT | Performed by: INTERNAL MEDICINE

## 2019-04-07 PROCEDURE — 76770 US EXAM ABDO BACK WALL COMP: CPT | Performed by: INTERNAL MEDICINE

## 2019-04-07 PROCEDURE — 99232 SBSQ HOSP IP/OBS MODERATE 35: CPT | Performed by: HOSPITALIST

## 2019-04-07 RX ORDER — NICOTINE 21 MG/24HR
1 PATCH, TRANSDERMAL 24 HOURS TRANSDERMAL DAILY
Status: DISCONTINUED | OUTPATIENT
Start: 2019-04-08 | End: 2019-04-09

## 2019-04-07 RX ORDER — BUMETANIDE 0.25 MG/ML
2 INJECTION, SOLUTION INTRAMUSCULAR; INTRAVENOUS ONCE
Status: COMPLETED | OUTPATIENT
Start: 2019-04-07 | End: 2019-04-07

## 2019-04-07 RX ORDER — ACETAMINOPHEN 325 MG/1
650 TABLET ORAL EVERY 6 HOURS PRN
Status: DISCONTINUED | OUTPATIENT
Start: 2019-04-07 | End: 2019-04-09

## 2019-04-07 RX ORDER — HEPARIN SODIUM 5000 [USP'U]/ML
5000 INJECTION, SOLUTION INTRAVENOUS; SUBCUTANEOUS EVERY 12 HOURS SCHEDULED
Status: DISCONTINUED | OUTPATIENT
Start: 2019-04-07 | End: 2019-04-09

## 2019-04-07 NOTE — PLAN OF CARE
Problem: Delirium  Goal: Minimize duration of delirium  Description  Interventions:  - Encourage use of hearing aids, eye glasses  - Promote highest level of mobility daily  - Provide frequent reorientation  - Promote wakefulness i.e. lights on, blinds o view, family's view, and healthcare provider's view of condition  - Facilitate patient and family articulation of goals for care  - Help patient and family identify pros/cons of alternative solutions  - Provide information as requested by patient/family  -

## 2019-04-07 NOTE — PROGRESS NOTES
[facility] H[facility]PITAL[facility]  Progress Note     Lucia Public Patient Status:  Inpatient    10/30/1955 MRN [id number]   [facility] 3NE-A Attending Joshua Florez MD   Hosp Day # 2 PCP [facility]     Chief Complaint: confusion    S: Patient i Imaging: Imaging data reviewed in Epic.     Medications:   • escitalopram  10 mg Oral QAM   • docusate sodium  100 mg Oral BID   • Labetalol HCl  200 mg Oral BID   • PEG 3350  17 g Oral Daily   • calcium acetate  1 capsule Oral BID with meals   • amLODI

## 2019-04-07 NOTE — PROGRESS NOTES
[facility]  Nephrology Progress Note    Haritha Braden Patient Status:  Inpatient    10/30/1955 MRN [id number]   [facility] 3NE-A Attending Ariane Dillon MD   Hosp Day # 2 PCP [facility]       SUBJECTIVE:  Much more alert and Lab 04/06/19  1646 04/06/19  1749 04/06/19  1947 04/06/19 2052 04/07/19  0714   PGLU 81 158* 280* 225* 128*       Meds:     Current Facility-Administered Medications:  dextrose 50 % injection 50 mL 50 mL Intravenous PRN   dextrose 5 %-0.45 % NaCl infusi today     #3. Hypernatremia- due to free water deficit. Resolved with hypotonic IVF and will d/c. Encourage po intake     #4. Skin rash- appears to be candidal infection. Per primary service     #5.  AMS/hx stroke-significantly better today and likely at

## 2019-04-07 NOTE — PHYSICAL THERAPY NOTE
Orders received, chart reviewed. Per history, pt is a resident at [address] [place] [address] & is dependent in ADLs, dependent in transfers via total lift equipment. Pt is not appropriate for skilled PT services, pt at baseline level, RN made aware & is in agreement.   DC PT s

## 2019-04-07 NOTE — PLAN OF CARE
Pt A&OX4; forgetful at times/ delayed responses  AHSAN on CKD-- bumex x1 given, IVF d/c, purewick catheter in place, Kidney US normal  Monitor elevated K+ levels; no BM's today  Accucheck QID  Bedbound- Q2 turn; skin checks- stage 11 on buttocks/left upper t

## 2019-04-07 NOTE — PROGRESS NOTES
1945  after hypoglycemia treatment protocol orders. MD paged to recheck at 2100.     2100 , after patient ate one whole burger for dinner. MD paged, ordered to hold insulin for tonight and recheck sugars in the morning. QID accuchecks.

## 2019-04-08 PROCEDURE — 95819 EEG AWAKE AND ASLEEP: CPT | Performed by: OTHER

## 2019-04-08 PROCEDURE — 99232 SBSQ HOSP IP/OBS MODERATE 35: CPT | Performed by: INTERNAL MEDICINE

## 2019-04-08 PROCEDURE — 99232 SBSQ HOSP IP/OBS MODERATE 35: CPT | Performed by: HOSPITALIST

## 2019-04-08 RX ORDER — SODIUM POLYSTYRENE SULFONATE 4.1 MEQ/G
15 POWDER, FOR SUSPENSION ORAL; RECTAL ONCE
Status: COMPLETED | OUTPATIENT
Start: 2019-04-08 | End: 2019-04-08

## 2019-04-08 RX ORDER — FLUDROCORTISONE ACETATE 0.1 MG/1
0.1 TABLET ORAL DAILY
Status: DISCONTINUED | OUTPATIENT
Start: 2019-04-08 | End: 2019-04-09

## 2019-04-08 NOTE — PROGRESS NOTES
[facility]  Nephrology Progress Note    Lucia Public Patient Status:  Inpatient    10/30/1955 MRN [id number]   [facility] 3NE-A Attending Joshua Florez MD   Hosp Day # 3 PCP [facility]       SUBJECTIVE:  Remains alert, upset interval not displayed.        Recent Labs   Lab 04/05/19  1303   ALT 15   AST 5*   ALB 1.9*       Recent Labs   Lab 04/07/19  0714 04/07/19  1230 04/07/19  1710 04/07/19  2125 04/08/19 0719   PGLU 128* 187* 206* 190* 107*       Meds:     Current Facility- Impression/Plan:    #1. AHSAN/CKD IV- baseline creat appears to be 2.4 mg/dl or so due to DM/HTN with AHSAN appearing related to prerenal azotemia although only modestly better with IVF. Creat relatively stable. Will follow     #2.   Hyperkalemia- like

## 2019-04-08 NOTE — PROCEDURES
[facility], [address] [place] [address]      PATIENT'S NAME: Liz Ribera   ATTENDING PHYSICIAN: Tabitha Aviles M.D.    PATIENT ACCOUNT #: [de-identified] LOCATION: [address] [place] [address]   MEDICAL RECORD #: [id number] DATE OF B epileptiform activity;   toxic metabolic encephalopathy is a predominant concern, but high susceptibility of seizure focus cannot be entirely ruled out. Clinical correlation is indicated.     Dictated By Horacio Cui M.D.  d: 04/08/2019 09:33:30  t: 04/08/201

## 2019-04-08 NOTE — PLAN OF CARE
Problem: Delirium  Goal: Minimize duration of delirium  Description  Interventions:  - Encourage use of hearing aids, eye glasses  - Promote highest level of mobility daily  - Provide frequent reorientation  - Promote wakefulness i.e. lights on, blinds o RN  Outcome: Progressing  4/7/2019 2112 by Milana Orellana RN  Outcome: Progressing     Problem: DECISION MAKING  Goal: Pt/Family able to effectively weigh alternatives and participate in decision making related to treatment and care  Description  IN

## 2019-04-08 NOTE — PLAN OF CARE
Resumed care of pt. At 1. PT. Is Ax3, cooperative, but forgetful. Seizure precautions maintained  Heparin  Purewick catheter changed at 2100    Pt. To have an EEG today, was not done on Sunday. RA, Tele: URIEL QUILES, -pt.  Removes  [employer] lift-turn Q2-W

## 2019-04-08 NOTE — CM/SW NOTE
04/08/19 1500   CM/SW Referral Data   Referral Source Physician   Reason for Referral Discharge planning   Informant Patient   Social History   Recreational Drug/Alcohol Use no   Major Changes Last 6 Months no   Domestic/Partner Violence no   Suicidal I

## 2019-04-08 NOTE — PROGRESS NOTES
[facility] HOSPITALIST  Progress Note     Glorious Bonnie Patient Status:  Inpatient    10/30/1955 MRN [id number]   [facility] 3NE-A Attending Cody Stoddard MD   Hosp Day # 3 PCP [facility]     Chief Complaint: confusion    S: Patient h No results for input(s): PTP, INR in the last 168 hours. No results for input(s): TROP, CK in the last 168 hours. Imaging: Imaging data reviewed in Epic.     Medications:   • Fludrocortisone Acetate  0.1 mg Oral Daily   • Heparin Sodium (Po

## 2019-04-09 VITALS
DIASTOLIC BLOOD PRESSURE: 58 MMHG | SYSTOLIC BLOOD PRESSURE: 140 MMHG | BODY MASS INDEX: 31 KG/M2 | WEIGHT: 180.13 LBS | RESPIRATION RATE: 18 BRPM | HEART RATE: 56 BPM | OXYGEN SATURATION: 98 % | TEMPERATURE: 98 F

## 2019-04-09 PROCEDURE — 99239 HOSP IP/OBS DSCHRG MGMT >30: CPT | Performed by: HOSPITALIST

## 2019-04-09 PROCEDURE — 99232 SBSQ HOSP IP/OBS MODERATE 35: CPT | Performed by: INTERNAL MEDICINE

## 2019-04-09 RX ORDER — FLUDROCORTISONE ACETATE 0.1 MG/1
0.1 TABLET ORAL DAILY
Qty: 30 TABLET | Refills: 0 | Status: SHIPPED | OUTPATIENT
Start: 2019-04-10

## 2019-04-09 RX ORDER — TORSEMIDE 20 MG/1
20 TABLET ORAL DAILY
Status: DISCONTINUED | OUTPATIENT
Start: 2019-04-09 | End: 2019-04-09

## 2019-04-09 RX ORDER — NICOTINE 21 MG/24HR
1 PATCH, TRANSDERMAL 24 HOURS TRANSDERMAL DAILY
Refills: 0 | Status: SHIPPED | COMMUNITY
Start: 2019-04-10 | End: 2019-06-02 | Stop reason: ALTCHOICE

## 2019-04-09 RX ORDER — TORSEMIDE 20 MG/1
20 TABLET ORAL DAILY
Qty: 30 TABLET | Refills: 0 | Status: SHIPPED | OUTPATIENT
Start: 2019-04-09 | End: 2019-06-02 | Stop reason: ALTCHOICE

## 2019-04-09 NOTE — PLAN OF CARE
Problem: SAFETY ADULT - FALL  Goal: Free from fall injury  Description  INTERVENTIONS:  - Assess pt frequently for physical needs  - Identify cognitive and physical deficits and behaviors that affect risk of falls.   - [place] fall precautions as indica initiate Family Care Conference as is appropriate  Outcome: Adequate for Discharge     Problem: CONFUSION  Goal: Confusion, delirium, dementia or psychosis is improved or at baseline  Description  INTERVENTIONS:  - Assess for possible contributors to thoug stimuli, as able  - Instruct patient/family in relaxation techniques, as appropriate  - Assess for spiritual and psychosocial needs and initiate Spiritual Care or Behavioral Health consult as needed  Outcome: Adequate for Discharge     Problem: DECISION MA

## 2019-04-09 NOTE — PLAN OF CARE
Robin barker Marion called and report given to [place], RN. All pertinent questions and concerns addressed.

## 2019-04-09 NOTE — PLAN OF CARE
Patient here AMS; toxic metabolic encephalopathy   Cr increased from baseline  Potassium elevated; kayexalate given twice today and placed on a low potassium diet  Patient aox4  Contracture to left hand   Patient wishes to return back to [address], her hus[place] condition  - Facilitate patient and family articulation of goals for care  - Help patient and family identify pros/cons of alternative solutions  - Provide information as requested by patient/family  - Respect patient/family right to receive or not to rece

## 2019-04-09 NOTE — PROGRESS NOTES
[facility]  Nephrology Progress Note    Nelly Iglesias Patient Status:  Inpatient    10/30/1955 MRN [id number]   [facility] 3NE-A Attending Patrecia Cooks, MD   Hosp Day # 4 PCP [facility]       SUBJECTIVE:  Feels better today, --   --  8.2* 7.6*  --  7.8*   GLU 98  --  125*  --   --  115* 93  --  99    < > = values in this interval not displayed.        Recent Labs   Lab 04/05/19  1303   ALT 15   AST 5*   ALB 1.9*       Recent Labs   Lab 04/08/19  0719 04/08/19  1233 04/08/19  17 PRN   hydrALAzine HCl (APRESOLINE) injection 10 mg 10 mg Intravenous Q6H PRN         Impression/Plan:    #1.  AHSAN/CKD IV- baseline creat appears to be close to 2.4 mg/dl or so due to DM/HTN with AHSAN initially thought due to prerenal azotemia but has not kate

## 2019-04-09 NOTE — PLAN OF CARE
NURSING DISCHARGE NOTE    Discharged Nursing home via Wheelchair. Accompanied by Support staff  Belongings Taken by patient/family. IV removed, discharge instructions given to [facility], all pertinent questions and concerns addressed.

## 2019-04-09 NOTE — CM/SW NOTE
MSW sent updates to [facility] and informed them of dc today at 3pm.  MSW requested [facility] for 3pm today. PCS form placed on chart. RN informed.     Raiza New LCSW

## 2019-04-09 NOTE — PLAN OF CARE
Resumed care of pt. At [address]. Pt. Is Ax3-4, appears sad because her  is in the hospital.    Seizure precautions maintained  Heparin SubQ  RA, Tele: NS-SB, -pt.  Removes  Purewick catheter  Fernando lift  Stage 2 sacrum  10mg Hydralzine PRN given via articulation of goals for care  - Help patient and family identify pros/cons of alternative solutions  - Provide information as requested by patient/family  - Respect patient/family right to receive or not to receive information  - Serve as a liaison galina

## 2019-04-11 NOTE — DISCHARGE SUMMARY
[facility] HOSPITALIST  DISCHARGE SUMMARY     Saba Vincent Patient Status:  Inpatient    10/30/1955 MRN [id number]   [facility] 3NE-A Attending No att. providers found   Hosp Day # 4 PCP [address] [facility]     Date of Admission: 2019  Date Take 1 tablet (0.1 mg total) by mouth daily. Quantity:  30 tablet  Refills:  0     nicotine 14 MG/24HR Pt24  Commonly known as:  NICODERM CQ      Place 1 patch onto the skin daily.    Refills:  0     torsemide 20 MG Tabs  Commonly known as:  DEMADEX Tabs  Commonly known as:  KEPPRA      Take 1 tablet (1,000 mg total) by mouth daily. Quantity:  60 tablet  Refills:  0     Polyethylene Glycol 3350 Pack  Commonly known as:  MIRALAX      Take 17 g by mouth daily.    Refills:  0     risperiDONE 0.5 MG Tabs

## 2019-05-31 ENCOUNTER — HOSPITAL ENCOUNTER (EMERGENCY)
Facility: HOSPITAL | Age: 64
Discharge: HOME | End: 2019-05-31
Attending: EMERGENCY MEDICINE
Payer: MEDICARE

## 2019-05-31 ENCOUNTER — APPOINTMENT (OUTPATIENT)
Dept: CT IMAGING | Facility: HOSPITAL | Age: 64
End: 2019-05-31
Attending: EMERGENCY MEDICINE
Payer: MEDICARE

## 2019-05-31 VITALS
DIASTOLIC BLOOD PRESSURE: 66 MMHG | WEIGHT: 180 LBS | RESPIRATION RATE: 14 BRPM | BODY MASS INDEX: 30.73 KG/M2 | HEIGHT: 64 IN | OXYGEN SATURATION: 96 % | HEART RATE: 66 BPM | TEMPERATURE: 97 F | SYSTOLIC BLOOD PRESSURE: 163 MMHG
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
## 2019-05-31 DIAGNOSIS — F03.90 DEMENTIA WITHOUT BEHAVIORAL DISTURBANCE, UNSPECIFIED DEMENTIA TYPE (HCC): ICD-10-CM

## 2019-05-31 PROCEDURE — 99284 EMERGENCY DEPT VISIT MOD MDM: CPT

## 2019-05-31 PROCEDURE — 70450 CT HEAD/BRAIN W/O DYE: CPT | Performed by: EMERGENCY MEDICINE

## 2019-05-31 NOTE — ED INITIAL ASSESSMENT (HPI)
Pt has unwitnessed fall and currently has neck pain and is in c-collar upon arrival. No deformity noted

## 2019-05-31 NOTE — ED PROVIDER NOTES
Patient Seen in: [facility] Emergency Department    History   Patient presents with:  Fall (musculoskeletal, neurologic)    Stated Complaint: Unwitnessed fall     HPI    51-year-old female who presents to the emergency department after a fall.   Josee well-nourished. HENT:   Head: Normocephalic and atraumatic. Mouth/Throat: Oropharynx is clear and moist.   No trauma appreciated on had evidence of what appears to be psoriasis in the scalp   Eyes: Pupils are equal, round, and reactive to light.  EOM ar HISTORY: (As transcribed by Technologist)  Unwitnessed fall. The patient has history of seizures. FINDINGS:   VENTRICLES/SULCI:  Ventricles and sulci are normal in size. INTRACRANIAL:  There are no abnormal extraaxial fluid collections.   There is no m

## 2019-06-02 ENCOUNTER — APPOINTMENT (OUTPATIENT)
Dept: GENERAL RADIOLOGY | Facility: HOSPITAL | Age: 64
End: 2019-06-02
Attending: EMERGENCY MEDICINE
Payer: MEDICARE

## 2019-06-02 ENCOUNTER — HOSPITAL ENCOUNTER (OUTPATIENT)
Facility: HOSPITAL | Age: 64
Setting detail: OBSERVATION
Discharge: NURSING, REHAB OR LONG-TERM CARE | End: 2019-06-04
Attending: EMERGENCY MEDICINE | Admitting: HOSPITALIST
Payer: MEDICARE

## 2019-06-02 DIAGNOSIS — B37.2 YEAST DERMATITIS: ICD-10-CM

## 2019-06-02 DIAGNOSIS — N18.4 CKD (CHRONIC KIDNEY DISEASE) STAGE 4, GFR 15-29 ML/MIN (HCC): ICD-10-CM

## 2019-06-02 DIAGNOSIS — E86.0 DEHYDRATION: ICD-10-CM

## 2019-06-02 DIAGNOSIS — R41.82 ALTERED MENTAL STATUS, UNSPECIFIED ALTERED MENTAL STATUS TYPE: Primary | ICD-10-CM

## 2019-06-02 PROCEDURE — 71045 X-RAY EXAM CHEST 1 VIEW: CPT | Performed by: EMERGENCY MEDICINE

## 2019-06-02 PROCEDURE — 99220 INITIAL OBSERVATION CARE,LEVL III: CPT | Performed by: HOSPITALIST

## 2019-06-02 RX ORDER — DIPHENHYDRAMINE HCL 25 MG
25 CAPSULE ORAL EVERY 6 HOURS PRN
Status: ON HOLD | COMMUNITY
End: 2019-08-07

## 2019-06-02 RX ORDER — DOXYCYCLINE HYCLATE 100 MG/1
100 CAPSULE ORAL 2 TIMES DAILY
Status: ON HOLD | COMMUNITY
Start: 2019-05-24 | End: 2019-06-04

## 2019-06-02 RX ORDER — LABETALOL 200 MG/1
200 TABLET, FILM COATED ORAL
Status: DISCONTINUED | OUTPATIENT
Start: 2019-06-03 | End: 2019-06-04

## 2019-06-02 RX ORDER — ACETAMINOPHEN 325 MG/1
650 TABLET ORAL EVERY 6 HOURS PRN
Status: DISCONTINUED | OUTPATIENT
Start: 2019-06-02 | End: 2019-06-04

## 2019-06-02 RX ORDER — FLUDROCORTISONE ACETATE 0.1 MG/1
0.1 TABLET ORAL DAILY
Status: DISCONTINUED | OUTPATIENT
Start: 2019-06-02 | End: 2019-06-04

## 2019-06-02 RX ORDER — AMLODIPINE BESYLATE 5 MG/1
5 TABLET ORAL DAILY
Status: DISCONTINUED | OUTPATIENT
Start: 2019-06-02 | End: 2019-06-04

## 2019-06-02 RX ORDER — FLUCONAZOLE 100 MG/1
100 TABLET ORAL
Status: DISCONTINUED | OUTPATIENT
Start: 2019-06-02 | End: 2019-06-04

## 2019-06-02 RX ORDER — LEVETIRACETAM 500 MG/1
1000 TABLET ORAL DAILY
Status: DISCONTINUED | OUTPATIENT
Start: 2019-06-02 | End: 2019-06-04

## 2019-06-02 RX ORDER — ONDANSETRON 2 MG/ML
4 INJECTION INTRAMUSCULAR; INTRAVENOUS EVERY 6 HOURS PRN
Status: DISCONTINUED | OUTPATIENT
Start: 2019-06-02 | End: 2019-06-04

## 2019-06-02 RX ORDER — CLOTRIMAZOLE 1 %
CREAM (GRAM) TOPICAL 2 TIMES DAILY
Status: DISCONTINUED | OUTPATIENT
Start: 2019-06-02 | End: 2019-06-04

## 2019-06-02 RX ORDER — FLUCONAZOLE 100 MG/1
100 TABLET ORAL DAILY
Status: ON HOLD | COMMUNITY
End: 2019-06-04

## 2019-06-02 RX ORDER — METOCLOPRAMIDE HYDROCHLORIDE 5 MG/ML
5 INJECTION INTRAMUSCULAR; INTRAVENOUS EVERY 8 HOURS PRN
Status: DISCONTINUED | OUTPATIENT
Start: 2019-06-02 | End: 2019-06-04

## 2019-06-02 RX ORDER — SODIUM BICARBONATE 325 MG/1
650 TABLET ORAL 2 TIMES DAILY
Status: DISCONTINUED | OUTPATIENT
Start: 2019-06-02 | End: 2019-06-04

## 2019-06-02 RX ORDER — ESCITALOPRAM OXALATE 10 MG/1
10 TABLET ORAL DAILY
Status: DISCONTINUED | OUTPATIENT
Start: 2019-06-02 | End: 2019-06-04

## 2019-06-02 RX ORDER — KETOCONAZOLE 20 MG/G
CREAM TOPICAL DAILY
COMMUNITY
End: 2019-06-15

## 2019-06-02 RX ORDER — DEXTROSE AND SODIUM CHLORIDE 5; .9 G/100ML; G/100ML
INJECTION, SOLUTION INTRAVENOUS ONCE
Status: COMPLETED | OUTPATIENT
Start: 2019-06-02 | End: 2019-06-04

## 2019-06-02 RX ORDER — HYDROCODONE BITARTRATE AND ACETAMINOPHEN 5; 325 MG/1; MG/1
1 TABLET ORAL EVERY 6 HOURS PRN
Status: ON HOLD | COMMUNITY
End: 2019-12-10

## 2019-06-02 RX ORDER — CALCIUM ACETATE 667 MG/1
1 CAPSULE ORAL 2 TIMES DAILY
Status: DISCONTINUED | OUTPATIENT
Start: 2019-06-02 | End: 2019-06-04

## 2019-06-02 RX ORDER — DEXTROSE MONOHYDRATE 25 G/50ML
50 INJECTION, SOLUTION INTRAVENOUS ONCE
Status: COMPLETED | OUTPATIENT
Start: 2019-06-02 | End: 2019-06-02

## 2019-06-02 RX ORDER — HEPARIN SODIUM 5000 [USP'U]/ML
5000 INJECTION, SOLUTION INTRAVENOUS; SUBCUTANEOUS EVERY 12 HOURS SCHEDULED
Status: DISCONTINUED | OUTPATIENT
Start: 2019-06-02 | End: 2019-06-04

## 2019-06-02 RX ORDER — DEXTROSE MONOHYDRATE 25 G/50ML
INJECTION, SOLUTION INTRAVENOUS
Status: DISPENSED
Start: 2019-06-02 | End: 2019-06-03

## 2019-06-02 RX ORDER — DOCUSATE SODIUM 100 MG/1
100 CAPSULE, LIQUID FILLED ORAL 2 TIMES DAILY
Status: DISCONTINUED | OUTPATIENT
Start: 2019-06-02 | End: 2019-06-04

## 2019-06-02 RX ORDER — SODIUM POLYSTYRENE SULFONATE 15 G/60ML
15 SUSPENSION ORAL; RECTAL DAILY
Status: ON HOLD | COMMUNITY
End: 2019-06-19

## 2019-06-02 NOTE — ED INITIAL ASSESSMENT (HPI)
Pt brought by EMS from [facility] for symptoms of decreased level of alertness. EMS report pt is normally oriented x 2. Pt arrives drowsy and does not speak.   She has also had 2 falls recently, for which she was evaluated at [facility] for most recent

## 2019-06-02 NOTE — ED NOTES
Jodi Schmidt Keeps repeating \"da, da, da da\" over and over.   Will watch nurse when she talks to her and did say \"hi\" to nurse

## 2019-06-02 NOTE — ED NOTES
Received call from [facility]. Confirms that pt is normally oriented x 2 and facility is treating her for severe rash/candida. They are aware of severity of rash symptoms. RN states that pt normally will remember staff's names.   She was trying

## 2019-06-03 ENCOUNTER — APPOINTMENT (OUTPATIENT)
Dept: CT IMAGING | Facility: HOSPITAL | Age: 64
End: 2019-06-03
Attending: HOSPITALIST
Payer: MEDICARE

## 2019-06-03 PROCEDURE — 70450 CT HEAD/BRAIN W/O DYE: CPT | Performed by: HOSPITALIST

## 2019-06-03 PROCEDURE — 99214 OFFICE O/P EST MOD 30 MIN: CPT | Performed by: OTHER

## 2019-06-03 PROCEDURE — 99225 SUBSEQUENT OBSERVATION CARE: CPT | Performed by: HOSPITALIST

## 2019-06-03 NOTE — H&P
[facility] HOSPITALIST  History and Physical     Dhara Duarte Patient Status:  Emergency    10/30/1955 MRN [id number]   Location [address] [facility] Attending Brian Bang MD   Hosp Day # 0 PCP Elizabeth Lilly MD     Chief Complaint: Onset   • Cancer Neg        Allergies:   Lisinopril              UNKNOWN, OTHER (SEE COMMENTS)    Comment:hyperkalemia    Medications:    No current facility-administered medications on file prior to encounter.    Current Outpatient Medications on File Prio Rfl:    acetaminophen 325 MG Oral Tab Take 650 mg by mouth every 6 (six) hours as needed for Pain. Disp:  Rfl:    Citalopram Hydrobromide 20 MG Oral Tab Take 40 mg by mouth daily.    Disp:  Rfl:    docusate sodium 100 MG Oral Cap Take 100 mg by mouth 2 (two Creatinine Clearance: 12.5 mL/min (A) (based on SCr of 3.39 mg/dL (H)). No results for input(s): PTP, INR in the last 168 hours. No results for input(s): TROP, CK in the last 168 hours. Imaging: Imaging data reviewed in Epic.       ASSESSMENT / Dong Bianchi

## 2019-06-03 NOTE — ED NOTES
Pt straight cathed with assist of 2 other staff members. Pt jerald area is severely red and excoriated. Pt upper inner thighs with thick, course skin with deep grooves.

## 2019-06-03 NOTE — PROGRESS NOTES
[facility] HOSPITALIST  Progress Note     Alicia Herr Patient Status:  Observation    10/30/1955 MRN [id number]   [facility] 4NW-A Attending Chris Jaffe MD   Hosp Day # 0 PCP Jerilyn Ch MD     Chief Complaint: AMS    S: Patient c 1,000 mg Oral Daily   • sodium bicarbonate  650 mg Oral BID   • Fludrocortisone Acetate  0.1 mg Oral Daily   • fluconazole  100 mg Oral Q48H   • betamethasone valerate   Topical BID   • clotrimazole   Topical BID   • Heparin Sodium (Porcine)  5,000 Units S

## 2019-06-03 NOTE — PROGRESS NOTES
NURSING ADMISSION NOTE      Patient admitted via Cart  Oriented to room. Safety precautions initiated. Bed in low position. Call light in reach. A&O times 1, very confused. Unable to assist with navigator. RN called NH staff to clarify meds.   Shahana Blue

## 2019-06-03 NOTE — ED NOTES
notified of pt's current skin status, foul odor  And that facility is aware and treating skin at this time.

## 2019-06-03 NOTE — PROCEDURES
[address] EEG report    Name: Evon Ho    Date of Study: 6/3/2019      Routine EEG Report    Ordering Physician: Brittaney Guerrero MD                             Primary Care Physician: Christian Rodriguez MD    Technical As HYPERKALEMIA, SEBORRHEIC DERMATITIS OF SCALP, SZ DISORDER, UNSTEADY GAIT  RX: NORMODYNE  PREVIOUS EE2019 ABNORMAL DUE TO INTERMITTENT HIGH VOLTAGE SHARP CONTOURED WAVEFORM IN BILATERAL FRONTOCENTRAL HEAD REGIONS WITHOUT A CLEAR FOCUS AND MOSTLY LIKE

## 2019-06-03 NOTE — PROGRESS NOTES
[id number] [facility] Neurology Preliminary Note    Glorious Bonnie Patient Status:  Observation    10/30/1955 MRN [id number]   [facility] 4[facility]-A Attending Cody Owens MD   Hosp Day # 0 PCP Doretha Garcia MD     REASON FOR EVALUATION: file.    Current Facility-Administered Medications:  escitalopram (LEXAPRO) tablet 10 mg 10 mg Oral Daily   docusate sodium (COLACE) cap 100 mg 100 mg Oral BID   Labetalol HCl (NORMODYNE) tab 200 mg 200 mg Oral 2x Daily(Beta Blocker)   calcium acetate (RADHA pending  Ammonia WNL      IMAGING:  EEG complete, results pending   CTH Pending   [facility] 5/31/19 s/p fall was normal      Seizure precautions  Continue Keppra 1g daily, consider dosing at night as patient had issues with lethargy in the past (Keppra was decre

## 2019-06-03 NOTE — PROGRESS NOTES
[facility] Pharmacy Note:  Renal Adjustment for Fluconazole     Mohini Gauthier is a 66year old female who has been prescribed fluconazole  100 mg every 24 hrs. CrCl is estimated creatinine clearance is 12.5 mL/min (A) (based on SCr of 3.39 mg/dL (H)).  so t

## 2019-06-03 NOTE — CONSULTS
[id number] [facility] Neurology Initial Consultation    Declan Martines Patient Status:  Observation    10/30/1955 MRN [id number]   [facility] 4NW-A Attending Topher Steven MD   Hosp Day # 0 PCP Romina Cao MD     [facility] outpatient medications on file.     Current Facility-Administered Medications:  escitalopram (LEXAPRO) tablet 10 mg 10 mg Oral Daily   docusate sodium (COLACE) cap 100 mg 100 mg Oral BID   Labetalol HCl (NORMODYNE) tab 200 mg 200 mg Oral 2x Daily(Beta Block PERRL, EOM appear intact though inconsistent tracking, tongue and palate midline, SCM intact, otherwise, CN 2-12 intact  Motor: squeezes hands bilaterally and lifts both UE antigravity - no movement in LE; generally tremulous  Sensory: ; says \"ow\" to nox brain    Will follow    Ivonne Garcia MD, Neurology  [facility]  Pager [phone]  6/3/2019

## 2019-06-04 VITALS
BODY MASS INDEX: 33.38 KG/M2 | HEART RATE: 66 BPM | OXYGEN SATURATION: 96 % | DIASTOLIC BLOOD PRESSURE: 42 MMHG | WEIGHT: 170 LBS | RESPIRATION RATE: 18 BRPM | HEIGHT: 60 IN | TEMPERATURE: 99 F | SYSTOLIC BLOOD PRESSURE: 142 MMHG

## 2019-06-04 PROBLEM — G93.41 ACUTE METABOLIC ENCEPHALOPATHY: Status: ACTIVE | Noted: 2018-12-07

## 2019-06-04 PROCEDURE — 99214 OFFICE O/P EST MOD 30 MIN: CPT | Performed by: OTHER

## 2019-06-04 PROCEDURE — 99217 OBSERVATION CARE DISCHARGE: CPT | Performed by: HOSPITALIST

## 2019-06-04 RX ORDER — DEXTROSE MONOHYDRATE 25 G/50ML
50 INJECTION, SOLUTION INTRAVENOUS
Status: DISCONTINUED | OUTPATIENT
Start: 2019-06-04 | End: 2019-06-04

## 2019-06-04 RX ORDER — CEFDINIR 300 MG/1
300 CAPSULE ORAL DAILY
Qty: 6 CAPSULE | Refills: 0 | Status: SHIPPED | OUTPATIENT
Start: 2019-06-05 | End: 2019-06-11

## 2019-06-04 RX ORDER — FLUCONAZOLE 100 MG/1
100 TABLET ORAL DAILY
Qty: 2 TABLET | Refills: 0 | Status: SHIPPED | COMMUNITY
Start: 2019-06-04 | End: 2019-06-15

## 2019-06-04 RX ORDER — CEFDINIR 300 MG/1
300 CAPSULE ORAL DAILY
Qty: 6 CAPSULE | Refills: 0 | Status: SHIPPED | OUTPATIENT
Start: 2019-06-05 | End: 2019-06-04

## 2019-06-04 RX ORDER — LEVETIRACETAM 1000 MG/1
1000 TABLET ORAL NIGHTLY
Qty: 60 TABLET | Refills: 0 | Status: SHIPPED | OUTPATIENT
Start: 2019-06-04 | End: 2019-06-15

## 2019-06-04 NOTE — PLAN OF CARE
Patient received this am drowsy, arousable, good appetite, lungs clear on room air, no cough or SOB noted.  Abdomen soft, bowel sounds present, purwick draining clear, yellow urine, rash to torso, back, thighs and abdomen dry and flakey, red, denies pain an

## 2019-06-04 NOTE — CM/SW NOTE
Patient discharged per MD to [facility]. [facility] to pick-up at 6 pm. Please call report to # [phone].  notified of the above.     Candido Preston, 06/04/19, 4:32 [phone]

## 2019-06-04 NOTE — CM/SW NOTE
CM received call from Alfredo Hutchinson (Spouse). He states May Duron has been living at [facility] for 4 months and she will return there. She has no children. Spouse given condition update. CM awaiting return call from Mary Boogie for addition information.

## 2019-06-04 NOTE — PROGRESS NOTES
On high fall risk,denies any pain appetite has been good,rashes around abd area w/ cream ordered,sore on buttocks area,md ordered wound care to see in am,repositioned,seen by neuro,on seizure precautions,will monitor.

## 2019-06-04 NOTE — CM/SW NOTE
AZEB left message with Ngoc Carlin (spouse) and Leticia Pringle (Guardian) to discuss discharge plan of care. Awaiting return call. Per RN, patient is from [place].      AZBE/BRAYDON to follow~ Edie VELAZQUEZ, 06/04/19, 4:00 [phone]

## 2019-06-04 NOTE — PROGRESS NOTES
[id number] [facility] Neurology Progress Note    Stacy Fish Patient Status:  Observation    10/30/1955 MRN [id number]   [facility] 4NW-A Attending Deneen Vivar MD   Hosp Day # 0 PCP Je De Jesus MD         Subjective:  Yobani Santo cerebral dysfunction or suggestive of a moderate encephalopathy. No epileptiform activity, or clinical or electrographic seizures were noted on this awake and asleep recording. [address] [place] [address] 6/3/19:  CONCLUSION:  No acute intracranial abnormality identified.   Francis Dunn Location: Right arm)   Pulse 66   Temp 98.5 °F (36.9 °C) (Oral)   Resp 18   Ht 60\"   Wt 170 lb   SpO2 96%   BMI 33.20 kg/m²     Patient Vitals for the past 24 hrs:   BP Temp Temp src Pulse Resp SpO2   06/04/19 1208 142/42 98.5 °F (36.9 °C) Oral 66 18 96 % Mild cardiomegaly likely present. If clinical symptoms persist consider followup radiographs or CT.    Dictated by: Kalie Arriaga MD on 6/02/2019 at 19:14     Approved by: Kalie Arriaga MD                Labs:   BMP:   No results found for: GLUCOSE  Lab Results qam and 500mg qhs during admission in early Dec 2018, but then next admission later that month was increased to 1g nightly due to breakthrough seizures)  No sedatives, narcotics  Infectious and metabolic workup per primary services - likely UTI on CTX now

## 2019-06-04 NOTE — PLAN OF CARE
Pt alert and confused. Pt oriented to self only. Pt saying \"ow\". When asked if shes having pain pt says \"yes\". When asked where the pain is pt touches groin area and says \"here\". Pt urine cx positive for gram negative rods.  Physician paged and waitin as needed  - Instruct patient on self management of diabetes  Outcome: Progressing     Problem: SKIN/TISSUE INTEGRITY - ADULT  Goal: Skin integrity remains intact  Description  INTERVENTIONS  - Assess and document risk factors for pressure ulcer developmen

## 2019-06-05 NOTE — ED NOTES
Pt has what appears to yeast like rash under breasts, axilla,   Foul smell coming from areas with whitish/yellowish discharge from areas that is foul smelling.   Umbilical area with drainage that is foul smelling

## 2019-06-05 NOTE — DISCHARGE SUMMARY
[facility] HOSPITALIST  DISCHARGE SUMMARY     Nelson Sparks Patient Status:  Observation    10/30/1955 MRN [id number]   [facility] 4NW-A Attending No att. providers found   Hosp Day # 0 PCP Cliff Reyna MD     Date of Admission:  acutely confused.         Brief Synopsis: pt had neuro eval w/ neg CTH and EEG. Found to have UTI w/ UCx + for providencia. Started on abx. Started to wake up and appears to be back at baseline.  OK to DC back to NH    Procedures during hospitalization:   • Refills:  0     Citalopram Hydrobromide 20 MG Tabs  Commonly known as:  CeleXA      Take 40 mg by mouth daily.    Refills:  0     diphenhydrAMINE HCl 25 MG Caps  Commonly known as:  BENADRYL      Take 25 mg by mouth every 6 (six) hours as needed for Itching Doxycycline Hyclate 100 MG Caps  Commonly known as:  VIBRAMYCIN              Where to Get Your Medications      These medications were sent to [facility] ([address] [place] [address] Specialty) [address], Chandana Rosado, [phone]  [address]

## 2019-06-07 NOTE — ED PROVIDER NOTES
Patient Seen in: [facility] 4nw-a    History   Patient presents with:  Altered Mental Status (neurologic)    Stated Complaint: altered mental status    HPI    82-year-old female presents from [facility] as patient has had decreased level of Pulse 78   Resp 16   Temp 98.2 °F (36.8 °C)   Temp src Temporal   SpO2 96 %   O2 Device None (Room air)       Current:/42 (BP Location: Right arm)   Pulse 66   Temp 98.5 °F (36.9 °C) (Oral)   Resp 18   Ht 152.4 cm (5')   Wt 77.1 kg   SpO2 96%   BMI Leukocyte Esterase Urine Moderate (*)     WBC Urine >50 (*)     RBC URINE >10 (*)     Bacteria Urine 1+ (*)     Squamous Epi.  Cells Large (*)     Amorphous Crystals Few (*)     Non-Squamous Epithelial Moderate (*)     All other components within normal shahid normal limits   POCT GLUCOSE - Abnormal; Notable for the following components:    POC Glucose 165 (*)     All other components within normal limits   POCT GLUCOSE - Abnormal; Notable for the following components:    POC Glucose 258 (*)     All other compon Please view results for these tests on the individual orders.    RAINBOW DRAW BLUE   RAINBOW DRAW LAVENDER   RAINBOW DRAW LIGHT GREEN   RAINBOW DRAW GOLD          On exam patient does appear and no significant distress but definitely is not able to an Unknown Unknown    Dehydration E86.0 Unknown     Yeast dermatitis B37.2 4/5/2019             Present on Admission  Date Reviewed: 11/24/2018          ICD-10-CM Noted POA    * (Principal) Altered mental status, unspecified altered mental status type R41.82

## 2019-06-15 ENCOUNTER — HOSPITAL ENCOUNTER (INPATIENT)
Facility: HOSPITAL | Age: 64
LOS: 4 days | Discharge: NURSING, REHAB OR LONG-TERM CARE | DRG: 193 | End: 2019-06-19
Attending: EMERGENCY MEDICINE | Admitting: INTERNAL MEDICINE
Payer: MEDICARE

## 2019-06-15 ENCOUNTER — APPOINTMENT (OUTPATIENT)
Dept: CT IMAGING | Facility: HOSPITAL | Age: 64
DRG: 193 | End: 2019-06-15
Attending: EMERGENCY MEDICINE
Payer: MEDICARE

## 2019-06-15 ENCOUNTER — APPOINTMENT (OUTPATIENT)
Dept: GENERAL RADIOLOGY | Facility: HOSPITAL | Age: 64
DRG: 193 | End: 2019-06-15
Attending: EMERGENCY MEDICINE
Payer: MEDICARE

## 2019-06-15 DIAGNOSIS — R41.82 ALTERED MENTAL STATUS, UNSPECIFIED ALTERED MENTAL STATUS TYPE: ICD-10-CM

## 2019-06-15 DIAGNOSIS — Z71.89 COUNSELING REGARDING ADVANCE CARE PLANNING AND GOALS OF CARE: ICD-10-CM

## 2019-06-15 DIAGNOSIS — E86.0 DEHYDRATION: ICD-10-CM

## 2019-06-15 DIAGNOSIS — J18.9 NOSOCOMIAL PNEUMONIA: Primary | ICD-10-CM

## 2019-06-15 DIAGNOSIS — R41.89 COGNITIVE IMPAIRMENT: ICD-10-CM

## 2019-06-15 DIAGNOSIS — N18.9 CHRONIC KIDNEY DISEASE, UNSPECIFIED CKD STAGE: ICD-10-CM

## 2019-06-15 DIAGNOSIS — Z51.5 PALLIATIVE CARE ENCOUNTER: ICD-10-CM
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
## 2019-06-15 DIAGNOSIS — G92.8 TOXIC METABOLIC ENCEPHALOPATHY: ICD-10-CM

## 2019-06-15 PROCEDURE — 70450 CT HEAD/BRAIN W/O DYE: CPT | Performed by: EMERGENCY MEDICINE

## 2019-06-15 PROCEDURE — 99223 1ST HOSP IP/OBS HIGH 75: CPT | Performed by: HOSPITALIST

## 2019-06-15 PROCEDURE — 71045 X-RAY EXAM CHEST 1 VIEW: CPT | Performed by: EMERGENCY MEDICINE

## 2019-06-15 RX ORDER — KETOCONAZOLE 20 MG/ML
SHAMPOO TOPICAL
COMMUNITY

## 2019-06-15 RX ORDER — LABETALOL HYDROCHLORIDE 5 MG/ML
10 INJECTION, SOLUTION INTRAVENOUS ONCE
Status: COMPLETED | OUTPATIENT
Start: 2019-06-15 | End: 2019-06-15

## 2019-06-15 RX ORDER — DEXTROSE MONOHYDRATE 25 G/50ML
50 INJECTION, SOLUTION INTRAVENOUS
Status: DISCONTINUED | OUTPATIENT
Start: 2019-06-15 | End: 2019-06-19

## 2019-06-15 RX ORDER — METOCLOPRAMIDE HYDROCHLORIDE 5 MG/ML
5 INJECTION INTRAMUSCULAR; INTRAVENOUS EVERY 8 HOURS PRN
Status: DISCONTINUED | OUTPATIENT
Start: 2019-06-15 | End: 2019-06-19

## 2019-06-15 RX ORDER — HEPARIN SODIUM 5000 [USP'U]/ML
5000 INJECTION, SOLUTION INTRAVENOUS; SUBCUTANEOUS EVERY 8 HOURS SCHEDULED
Status: DISCONTINUED | OUTPATIENT
Start: 2019-06-15 | End: 2019-06-16

## 2019-06-15 RX ORDER — SODIUM CHLORIDE 9 MG/ML
125 INJECTION, SOLUTION INTRAVENOUS CONTINUOUS
Status: DISCONTINUED | OUTPATIENT
Start: 2019-06-15 | End: 2019-06-17

## 2019-06-15 RX ORDER — SODIUM CHLORIDE 9 MG/ML
INJECTION, SOLUTION INTRAVENOUS CONTINUOUS
Status: DISCONTINUED | OUTPATIENT
Start: 2019-06-15 | End: 2019-06-19

## 2019-06-15 RX ORDER — NYSTATIN 100000 U/G
1 CREAM TOPICAL 2 TIMES DAILY
COMMUNITY

## 2019-06-15 RX ORDER — LEVETIRACETAM 1000 MG/1
1000 TABLET ORAL DAILY
Status: ON HOLD | COMMUNITY
End: 2019-12-19

## 2019-06-15 RX ORDER — ONDANSETRON 2 MG/ML
4 INJECTION INTRAMUSCULAR; INTRAVENOUS EVERY 6 HOURS PRN
Status: DISCONTINUED | OUTPATIENT
Start: 2019-06-15 | End: 2019-06-19

## 2019-06-15 RX ORDER — ENOXAPARIN SODIUM 100 MG/ML
40 INJECTION SUBCUTANEOUS DAILY
Status: DISCONTINUED | OUTPATIENT
Start: 2019-06-15 | End: 2019-06-15

## 2019-06-15 RX ORDER — ACETAMINOPHEN 325 MG/1
650 TABLET ORAL EVERY 6 HOURS PRN
Status: DISCONTINUED | OUTPATIENT
Start: 2019-06-15 | End: 2019-06-17

## 2019-06-15 NOTE — ED NOTES
Patient returned from CT scan and placed back on cardiac monitor. Awaiting results; will continue to monitor.

## 2019-06-15 NOTE — ED INITIAL ASSESSMENT (HPI)
Patient to ED with AMS since yesterday from [address]. Patient with history of AMS and UTI's. On arrival patient responsive to verbal stimulation.

## 2019-06-15 NOTE — H&P
[facility] HOSPITALIST  History and Physical     Elma Barron Patient Status:  Emergency    10/30/1955 MRN [id number]   Location [facility] Attending Brianne Vasques MD   Hosp Day # 0 PCP Shannon Nelson MD     Chief Compla smokeless tobacco. She reports that she does not drink alcohol or use drugs.     Family History:   Family History   Problem Relation Age of Onset   • Cancer Neg         Allergies:   Lisinopril              UNKNOWN, OTHER (SEE COMMENTS)    Comment:hyperkalem Disp: 90 tablet Rfl: 0   calcium acetate 667 MG Oral Cap Take 1 capsule by mouth 2 (two) times daily. Disp:  Rfl:    Multiple Vitamin (TAB-A-RAHEEM) Oral Tab Take 1 tablet by mouth daily.  Disp:  Rfl:    acetaminophen 325 MG Oral Tab Take 650 mg by mouth ever Estimated Creatinine Clearance: 11.3 mL/min (A) (based on SCr of 3.76 mg/dL (H)). No results for input(s): PTP, INR in the last 168 hours. No results for input(s): TROP, CK in the last 168 hours. Imaging: Imaging data reviewed in Epic.

## 2019-06-15 NOTE — PROGRESS NOTES
[facility] Pharmacy Note:  Renal Adjustment for piperacillin/tazobactam (ZOSYN)    Galileo Shepherd is a 66year old female who has been prescribed piperacillin/tazobactam (ZOSYN) 3.375 g every 8 hrs.   CrCl is estimated creatinine clearance is 11.3 mL/min (A)

## 2019-06-15 NOTE — ED PROVIDER NOTES
Patient Seen in: [facility] Emergency Department    History   Patient presents with:  Altered Mental Status (neurologic)    Stated Complaint: AMS from [address]    HPI    Patient is a 61-year-old female nursing home resident with a history of COPD, sei 83   Resp 20   Temp 98.5 °F (36.9 °C)   Temp src Temporal   SpO2 93 %   O2 Device None (Room air)       Current:BP (!) 174/72   Pulse 85   Temp 98.5 °F (36.9 °C) (Temporal)   Resp 18   Wt 77.1 kg   SpO2 95%   BMI 33.20 kg/m²         Physical Exam   Constit Notable for the following components:    RBC 3.04 (*)     HGB 9.0 (*)     HCT 28.0 (*)     RDW-SD 47.8 (*)     Neutrophil Absolute Prelim 8.61 (*)     Neutrophil Absolute 8.61 (*)     All other components within normal limits   LACTIC ACID, PLASMA - Normal MD            Xr Chest Ap Portable  (cpt=71045)    Result Date: 6/15/2019  CONCLUSION:  1. Findings are suggestive of bronchitis/reactive airways disease. There is more prominent right perihilar airspace opacity which may represent developing pneumonia. follows her there but [facility] after discussion with Dr. Perla Anderson will readmit to them.         Disposition and Plan     Clinical Impression:  Nosocomial pneumonia  (primary encounter diagnosis)  A

## 2019-06-16 PROCEDURE — 99233 SBSQ HOSP IP/OBS HIGH 50: CPT | Performed by: HOSPITALIST

## 2019-06-16 RX ORDER — NYSTATIN 100000 U/G
1 CREAM TOPICAL 2 TIMES DAILY
Status: DISCONTINUED | OUTPATIENT
Start: 2019-06-16 | End: 2019-06-19

## 2019-06-16 RX ORDER — ESCITALOPRAM OXALATE 20 MG/1
20 TABLET ORAL DAILY
Status: DISCONTINUED | OUTPATIENT
Start: 2019-06-16 | End: 2019-06-19

## 2019-06-16 RX ORDER — RISPERIDONE 0.5 MG/1
0.5 TABLET, FILM COATED ORAL 2 TIMES DAILY
Status: DISCONTINUED | OUTPATIENT
Start: 2019-06-16 | End: 2019-06-19

## 2019-06-16 RX ORDER — ACETAMINOPHEN 325 MG/1
650 TABLET ORAL EVERY 6 HOURS PRN
Status: DISCONTINUED | OUTPATIENT
Start: 2019-06-16 | End: 2019-06-16

## 2019-06-16 RX ORDER — DOCUSATE SODIUM 100 MG/1
100 CAPSULE, LIQUID FILLED ORAL 2 TIMES DAILY
Status: DISCONTINUED | OUTPATIENT
Start: 2019-06-16 | End: 2019-06-19

## 2019-06-16 RX ORDER — BACLOFEN 20 MG/1
20 TABLET ORAL 3 TIMES DAILY
Status: DISCONTINUED | OUTPATIENT
Start: 2019-06-16 | End: 2019-06-19

## 2019-06-16 RX ORDER — BISACODYL 10 MG
10 SUPPOSITORY, RECTAL RECTAL
Status: DISCONTINUED | OUTPATIENT
Start: 2019-06-16 | End: 2019-06-19

## 2019-06-16 RX ORDER — SELENIUM SULFIDE 2.5 MG/100ML
LOTION TOPICAL
Status: DISCONTINUED | OUTPATIENT
Start: 2019-06-16 | End: 2019-06-19

## 2019-06-16 RX ORDER — SODIUM BICARBONATE 325 MG/1
650 TABLET ORAL 2 TIMES DAILY
Status: DISCONTINUED | OUTPATIENT
Start: 2019-06-16 | End: 2019-06-19

## 2019-06-16 RX ORDER — DIPHENHYDRAMINE HCL 25 MG
25 CAPSULE ORAL EVERY 6 HOURS PRN
Status: DISCONTINUED | OUTPATIENT
Start: 2019-06-16 | End: 2019-06-19

## 2019-06-16 RX ORDER — LABETALOL 200 MG/1
200 TABLET, FILM COATED ORAL 2 TIMES DAILY
Status: DISCONTINUED | OUTPATIENT
Start: 2019-06-16 | End: 2019-06-19

## 2019-06-16 RX ORDER — CALCIUM ACETATE 667 MG/1
1 CAPSULE ORAL 2 TIMES DAILY
Status: DISCONTINUED | OUTPATIENT
Start: 2019-06-16 | End: 2019-06-19

## 2019-06-16 RX ORDER — SODIUM POLYSTYRENE SULFONATE 15 G/60ML
15 SUSPENSION ORAL; RECTAL DAILY
Status: DISCONTINUED | OUTPATIENT
Start: 2019-06-16 | End: 2019-06-16

## 2019-06-16 RX ORDER — FLUDROCORTISONE ACETATE 0.1 MG/1
0.1 TABLET ORAL DAILY
Status: DISCONTINUED | OUTPATIENT
Start: 2019-06-16 | End: 2019-06-19

## 2019-06-16 RX ORDER — LEVETIRACETAM 500 MG/1
1000 TABLET ORAL DAILY
Status: DISCONTINUED | OUTPATIENT
Start: 2019-06-16 | End: 2019-06-19

## 2019-06-16 RX ORDER — HEPARIN SODIUM 5000 [USP'U]/ML
5000 INJECTION, SOLUTION INTRAVENOUS; SUBCUTANEOUS EVERY 12 HOURS SCHEDULED
Status: DISCONTINUED | OUTPATIENT
Start: 2019-06-16 | End: 2019-06-19

## 2019-06-16 RX ORDER — AMLODIPINE BESYLATE 5 MG/1
5 TABLET ORAL DAILY
Status: DISCONTINUED | OUTPATIENT
Start: 2019-06-16 | End: 2019-06-19

## 2019-06-16 NOTE — PLAN OF CARE
Problem: RISK FOR INFECTION - ADULT  Goal: Absence of fever/infection during anticipated neutropenic period  Description  INTERVENTIONS  - Monitor WBC  - Administer growth factors as ordered  - Implement neutropenic guidelines  Outcome: Progressing     P applied to sacrum, posterior thigh and inner thigh of right leg. According to nurse at [facility], these wounds have been progressing. Picture in chart. Pt has been resting in bed with position changes frequently. WCTM and round frequently throughout the night.

## 2019-06-16 NOTE — PROGRESS NOTES
[facility] Pharmacy Note:  Renal Dose Adjustment for Metoclopramide (REGLAN)    Evon Ho has been prescribed Metoclopramide (REGLAN) 10 mg every 8 hours as needed for N/V.     Estimated Creatinine Clearance: 11.3 mL/min (A) (based on SCr of 3.76 mg/dL (

## 2019-06-16 NOTE — PROGRESS NOTES
NURSING ADMISSION NOTE      Patient admitted via Cart  Oriented to room. Safety precautions initiated. Bed in low position. Call light in reach. Pt non verbal but responding to simple commands.  Admission assessment completed and navigator complet

## 2019-06-16 NOTE — CM/SW NOTE
Chart Review    From Replaced by [facility] (updates sent in Byron Freeman)    MSW called [address] [facility] office [phone] Hoda Denise @ 9:28am on 6/16/19 to update him on admission.

## 2019-06-16 NOTE — PROGRESS NOTES
[facility] HOSPITALIST  Progress Note     Nelson Sparks Patient Status:  Inpatient    10/30/1955 MRN [id number]   [facility] 5NW-A Attending Angela Kramer MD   Hosp Day # 1 PCP Cliff Reyna MD     Chief Complaint: AMS    S: Patient's m reviewed in Epic.     Medications:   • amLODIPine Besylate  5 mg Oral Daily   • baclofen  20 mg Oral TID   • betamethasone valerate   Topical BID   • betamethasone valerate  1 Application Topical Daily   • calcium acetate  1 capsule Oral BID   • Sodium Poly documentation in H+P. Based on patients current state of illness, I anticipate that, after discharge, patient will require TBD.

## 2019-06-17 PROCEDURE — 99239 HOSP IP/OBS DSCHRG MGMT >30: CPT | Performed by: HOSPITALIST

## 2019-06-17 RX ORDER — AMOXICILLIN AND CLAVULANATE POTASSIUM 875; 125 MG/1; MG/1
1 TABLET, FILM COATED ORAL 2 TIMES DAILY
Qty: 10 TABLET | Refills: 0 | Status: SHIPPED | OUTPATIENT
Start: 2019-06-17 | End: 2019-06-19

## 2019-06-17 RX ORDER — ACETAMINOPHEN 325 MG/1
650 TABLET ORAL EVERY 6 HOURS PRN
Status: DISCONTINUED | OUTPATIENT
Start: 2019-06-17 | End: 2019-06-19

## 2019-06-17 NOTE — PROGRESS NOTES
Problem: PNA, AMS, Dehydration      Data: Pt A&Ox1 . Hx seizures, seizure precautions in place. 2-3 L O2 via nasal cannula. . 3L is patients baseline. Incont. Turn Q2 , reposition as needed w/pillow support. Pt has foot drop, bunny boots applied.  LUE co

## 2019-06-17 NOTE — PROGRESS NOTES
[facility]  Progress Note    Mort Plate Patient Status:  Inpatient    10/30/1955 MRN [id number]   [facility] 5NW-A Attending Samra Carias MD   Hosp Day # 2 PCP Dalton López MD         SUBJECTIVE:  Subjective:  Jordana Steen input(s): URINE, CULTI, BLDSMR in the last 168 hours.             Meds:     • amLODIPine Besylate  5 mg Oral Daily   • baclofen  20 mg Oral TID   • betamethasone valerate   Topical BID   • betamethasone valerate  1 Application Topical Daily   • calcium acet reviewed  All consultant notes reviewed  Discussed with nursing on floor  dvt prophylaxis reviewed  PT and/or OT  Abisai Kothari MD

## 2019-06-17 NOTE — PLAN OF CARE
Problem: Impaired Swallowing  Goal: Minimize aspiration risk  Description  Interventions:  - Patient should be alert and upright for all feedings (90 degrees preferred)  - Offer food and liquids at a slow rate  - No straws  - Encourage small bites of martha

## 2019-06-17 NOTE — CONSULTS
[facility]  Report of Consultation    [facility]:  [facility]    10/30/1955 MRN [id number]   [facility] 5NW-A Attending Farideh Martinez MD   Hosp Day # 2 PCP Josseline Fox MD     Reason for Consultation:  pna Comment:hyperkalemia    Medications:      Prior to Admission Medications:  Amoxicillin-Pot Clavulanate 875-125 MG Oral Tab Take 1 tablet by mouth 2 (two) times daily for 5 days.          Current Facility-Administered Medications:   •  acetaminophen (TYLENOL glucose (DEX4) oral liquid 30 g, 30 g, Oral, Q15 Min PRN **OR** Glucose-Vitamin C (DEX-4) 4-6 GM-MG chewable tab 8 tablet, 8 tablet, Oral, Q15 Min PRN  •  Insulin Aspart Pen (NOVOLOG) 100 UNIT/ML flexpen 2-10 Units, 2-10 Units, Subcutaneous, TID CC and HS 3.2 oz (74.9 kg)    Intake/Output:  [unfilled]  @IOTHIS SHIFT@    Lab Data Review:  Recent Labs     06/15/19  1455 06/16/19  0632   WBC 10.4 11.5*   HGB 9.0* 7.7*   .0 304.0     Recent Labs     06/15/19  1455  06/16/19  0632  06/17/19  0812   NA

## 2019-06-17 NOTE — PHYSICAL THERAPY NOTE
PT orders rec'd via fall risk protocol, chart reviewed. Per notes, pt is a resident at [address] [place] [address] and dependent for all ADL's, dependent for mobility with use of total lift for transfers.  Will dc from our services as pt is not appropriate for skilled IPPT at [facility]

## 2019-06-17 NOTE — SLP NOTE
ADULT SWALLOWING EVALUATION    ASSESSMENT    ASSESSMENT/OVERALL IMPRESSION:  Orders were received for a bedside swallowing evaluation to r/o aspiration. This is patient's 3rd admit in 6 months. Patient with a history of CVA.    Contacted patient at the beds Altered mental status, unspecified altered mental status type    Dehydration    Chronic kidney disease, unspecified CKD stage      Past Medical History  Past Medical History:   Diagnosis Date   • Altered mental status     A&OX2 PER NORMAL    • Candidiasis Staff/Clinician assistance;Spoon;Cup;Consecutive swallows  Patient Positioning: Upright;Midline    Oral Phase of Swallow:  Within Functional Limits        Pharyngeal Phase of Swallow: Impaired  Laryngeal Elevation Timing: Appears impaired  Laryngeal Elevati

## 2019-06-17 NOTE — CM/SW NOTE
06/17/19 1500   CM/SW Referral Data   Referral Source Social Work (self-referral);    Reason for Referral Discharge planning   Informant THE [facility] Staff   Pertinent Medical Hx   Primary Care Physician Name Jesi Foreman   Patient Info   Patient

## 2019-06-17 NOTE — PROGRESS NOTES
[facility] HOSPITALIST  Progress Note     Nereida Howell Patient Status:  Inpatient    10/30/1955 MRN [id number]   [facility] 5NW-A Attending Dang Campoverde MD   Hosp Day # 2 PCP Eliud Rahman MD     Chief Complaint: AMS    S: Patient's m amLODIPine Besylate  5 mg Oral Daily   • baclofen  20 mg Oral TID   • betamethasone valerate   Topical BID   • betamethasone valerate  1 Application Topical Daily   • calcium acetate  1 capsule Oral BID   • sodium bicarbonate  650 mg Oral BID   • risperiDO

## 2019-06-17 NOTE — PLAN OF CARE
Patient is drowsy and oriented only to self; delayed response noted  No n/v/d  On Zosyn for PNA  Accucheck QID   Meds given crushed in apple sauce  Bilateral foot drop and bilateral upper extremity contracture noted  Dry, flaky skin also noted  Seizure pre

## 2019-06-17 NOTE — CONSULTS
[address] [place] [address]      Received request from Dr. Agustín Gan for Palliative Care Consultation. Chart reviewed noting patient has a guardian - Lucius Omalley [phone].     I called shruti Woods's office,

## 2019-06-18 ENCOUNTER — APPOINTMENT (OUTPATIENT)
Dept: GENERAL RADIOLOGY | Facility: HOSPITAL | Age: 64
DRG: 193 | End: 2019-06-18
Attending: INTERNAL MEDICINE
Payer: MEDICARE

## 2019-06-18 PROBLEM — Z51.5 PALLIATIVE CARE ENCOUNTER: Status: ACTIVE | Noted: 2019-06-18

## 2019-06-18 PROBLEM — Z71.89 COUNSELING REGARDING ADVANCE CARE PLANNING AND GOALS OF CARE: Status: ACTIVE | Noted: 2019-06-18

## 2019-06-18 PROCEDURE — 99222 1ST HOSP IP/OBS MODERATE 55: CPT | Performed by: NURSE PRACTITIONER

## 2019-06-18 PROCEDURE — 71045 X-RAY EXAM CHEST 1 VIEW: CPT | Performed by: INTERNAL MEDICINE

## 2019-06-18 PROCEDURE — 74230 X-RAY XM SWLNG FUNCJ C+: CPT | Performed by: INTERNAL MEDICINE

## 2019-06-18 NOTE — CONSULTS
[facility]  Report of Inpatient Wound Care Consultation    Dorota Murillo Patient Status:  Inpatient    10/30/1955 MRN [id number]   [facility] 5NW-A Attending Alysha Wayne MD   Hosp Day # 3 PCP Pamela Marques MD     Reason for --    *  --  95  --   --   --  89  --    CA 9.3  --  8.7  --   --   --  8.2*  --    ALB 2.0*  --   --   --   --   --  1.7*  --    TP 6.5  --   --   --   --   --  5.2*  --    PGLU  --    < >  --    < > 193* 180*  --  109*    < > = values in this int Pt rec'd in room sitting upright with the HOB elevated. pt breathing RA in NAD, disconnected from IV and tele monitor

## 2019-06-18 NOTE — PLAN OF CARE
Patient is alert to self, forgetful, follows commands. Q2H turn. Mepilex changed X3. Incontinent. Purwick and briefed. 1.5L NC. Nectar thick liquids. QID accucheck. Vitals stable. Will continue to monitor.      Problem: SAFETY ADULT - FALL  Goal: Free from

## 2019-06-18 NOTE — CONSULTS
[address] Patient Status:  Inpatient    10/30/1955 MRN [id number]   [facility] 5NW-A Attending Ananya Coley MD   Hosp Day # 3 PCP Lennox Ahr, MD     Date of Consult: / abnormal posture    • UTI (urinary tract infection)      Past Surgical History:   Procedure Laterality Date   • BACK SURGERY      c5-c7 herniated disc   • HC INCISION AND DRAINAGE PERIRECTAL ABSCESS         Palliative Care Social History:        Substance Daily PRN  •  Heparin Sodium (Porcine) 5000 UNIT/ML injection 5,000 Units, 5,000 Units, Subcutaneous, 2 times per day  •  Fludrocortisone Acetate (FLORINEF) tab 0.1 mg, 0.1 mg, Oral, Daily  •  docusate sodium (COLACE) cap 100 mg, 100 mg, Oral, BID  •  esci Sometimes hard to expand lungs. Cough:  Denies. Nausea:  denies  Pain:  denies  ---------  Dysphagia: noted per SLP eval notes. Last BM:  6/17/19 per record.       OBJECTIVE       Hematology:   Lab Results   Component Value Date    WBC 7.9 06/18/2019 perihilar atelectasis suspected. No sizable effusion, but small effusion difficult to exclude on portable chest x-ray. No evidence for pneumothorax. Consider upright PA and lateral examination of the chest, when tolerated.     Dictated by: Pavan Barroso, Assist  Total Care Minimal Drowsy/confused   10 Bedbound/coma Extensive Disease  Can't do any work  coma  Max Assist  Total Care Mouth care Drowsy or coma   0 Death     Palliative Care Assessment       Goals of Care Counseling/Discussion:    I received a v GOC to continue palliative with ability for pt to continue full treatment with known risks of possible aspiration PNA --> hospitalization --> possible life-threatening consequences. Guardian confirms, citing pt's previously expressed wishes for Full code. Hyperkalemia     Encephalopathy in sepsis     Sepsis (HCC)     Diabetes (HCC)     CKD (chronic kidney disease) stage 3, GFR 30-59 ml/min (HCC)     AHSAN (acute kidney injury) ([facility]     History of seizure     Cognitive impairment     Acute delirium     Severe [facility]. Surrogacy Act, Guardian Ephraim Lira (Guardian) [phone]  5. Psychosocial:  Emotional support provided. 6. Spiritual support:  Deferred. Disposition: GOC Established:  Continue current POC - Full Treatment. Plan DC to [facility] with [facility] [place] [facility]

## 2019-06-18 NOTE — PROGRESS NOTES
[facility]  Progress Note    Elma Barron Patient Status:  Inpatient    10/30/1955 MRN [id number]   [facility] 5NW-A Attending Linda Al MD   Hosp Day # 3 PCP Shannon Nelson MD     ASSESSMENT     1.  AMS, suspect metabo unspecified type     Stage 3 chronic kidney disease (HCC)     CKD (chronic kidney disease), stage IV (HCC)     Hypernatremia     Dehydration     Yeast dermatitis     Seizure disorder (HCC)     Chronic obstructive pulmonary disease (HCC)     CKD (chronic ki 9.3 8.7 8.2*   ALB 2.0*  --  1.7*    147* 145   K 5.1 4.4 4.3   * 117* 116*   CO2 20.0* 22.0 22.0   ALKPHO 116  --  78   AST 10*  --  5*   ALT 15  --  14   BILT 0.3  --  0.2   TP 6.5  --  5.2*     @MG@    Lab Results   Component Value Date    I 15 g Oral Q15 Min PRN   Or      Glucose-Vitamin C (DEX-4) 4-6 GM-MG chewable tab 4 tablet 4 tablet Oral Q15 Min PRN   Or      dextrose 50 % injection 50 mL 50 mL Intravenous Q15 Min PRN   Or      glucose (DEX4) oral liquid 30 g 30 g Oral Q15 Min PRN   Or

## 2019-06-18 NOTE — PLAN OF CARE
Problem: RISK FOR INFECTION - ADULT  Goal: Absence of fever/infection during anticipated neutropenic period  Description  INTERVENTIONS  - Monitor WBC  - Administer growth factors as ordered  - Implement neutropenic guidelines  Outcome: Progressing     P feeding and notify MD (or speech pathologist) if coughing or persistent throat clearing or wet/gurgly vocal quality is noted   Outcome: Progressing   Pt alert to self. Answers questions. Follows commands. Arms contracted. On 1.5 liters o2.  Has bunny boots GFR 15-29 ml/min (HCC)     Acute cystitis without hematuria     Chronic kidney disease, unspecified CKD stage     Palliative care encounter     Counseling regarding advance care planning and goals of care       Active issue(s) requiring resolution prior to

## 2019-06-18 NOTE — OCCUPATIONAL THERAPY NOTE
OT orders rec'd via fall risk protocol, chart reviewed. Per notes, pt is a resident at [address] [place] [address] and dependent for all ADL's, dependent for mobility with use of total lift for transfers.  Will dc from our services as pt is not appropriate for skilled IPOT at this

## 2019-06-18 NOTE — VIDEO SWALLOW STUDY NOTE
ADULT VIDEOFLUOROSCOPIC SWALLOWING STUDY    Admission Date: 6/15/2019  Evaluation Date: 06/18/19  Radiologist: Dr. Gutierrez Beams: Regular  Diet Recommendations - Liquid: Nectar thick    Further Follow-up:  Radha Lees Aspiration  Imaging results:   CXR on this admit:  1. Findings are suggestive of bronchitis/reactive airways disease. There is more prominent right perihilar airspace opacity which may represent developing pneumonia. Followup to resolution.          Reaso Spillage to: Valleculae  Delay (seconds): 1-2 seconds  Residue Severity, Location: Valleculae;Mild  Cleared/Reduced with: Secondary swallow  Laryngeal Penetration: None  Tracheal Aspiration: None  Strategy(ies) Implemented (Nectar Thick):  Slow rate;Small b clearing residue. Patient is at high risk of aspiration. GOALS  Goal #1 The patient will tolerate regular consistency and nectar thick liquids without overt signs or symptoms of aspiration with 95 % accuracy over 2 session(s).   In Progress

## 2019-06-18 NOTE — PROGRESS NOTES
[facility]  Progress Note    Mohini Gauthier Patient Status:  Inpatient    10/30/1955 MRN [id number]   [facility] 5NW-A Attending Steve Nicole MD   Hosp Day # 3 PCP Brad Coronel MD         SUBJECTIVE:  Subjective:  Rubin Ware PGLU 219* 193* 180* 109* 143*       No results for input(s): URINE, CULTI, BLDSMR in the last 168 hours.             Meds:     • amLODIPine Besylate  5 mg Oral Daily   • baclofen  20 mg Oral TID   • betamethasone valerate   Topical BID   • betamethasone v heparin  11.  Palliative care consult  12. 12 pulm md f/u  See tests ordered,  Available and radiology reviewed  All consultant notes reviewed  Discussed with nursing on floor  dvt prophylaxis reviewed  PT and/or OT  Siria Mejia MD

## 2019-06-19 ENCOUNTER — APPOINTMENT (OUTPATIENT)
Dept: GENERAL RADIOLOGY | Facility: HOSPITAL | Age: 64
DRG: 193 | End: 2019-06-19
Attending: INTERNAL MEDICINE
Payer: MEDICARE

## 2019-06-19 VITALS
TEMPERATURE: 98 F | HEART RATE: 54 BPM | SYSTOLIC BLOOD PRESSURE: 160 MMHG | BODY MASS INDEX: 36 KG/M2 | WEIGHT: 185 LBS | DIASTOLIC BLOOD PRESSURE: 60 MMHG | OXYGEN SATURATION: 97 % | RESPIRATION RATE: 16 BRPM

## 2019-06-19 PROCEDURE — 71046 X-RAY EXAM CHEST 2 VIEWS: CPT | Performed by: INTERNAL MEDICINE

## 2019-06-19 NOTE — PROGRESS NOTES
[facility]  Progress Note    Martha Blood Patient Status:  Inpatient    10/30/1955 MRN [id number]   [facility] 5NW-A Attending Adam Amador MD   Hosp Day # 4 PCP Anali Galvez MD     ASSESSMENT     1.  AMS, suspect metabol Leukocytosis, unspecified type     Stage 3 chronic kidney disease (HCC)     CKD (chronic kidney disease), stage IV (HCC)     Hypernatremia     Dehydration     Yeast dermatitis     Seizure disorder (HCC)     Chronic obstructive pulmonary disease ([facility]     C --  1.7*    147* 145   K 5.1 4.4 4.3   * 117* 116*   CO2 20.0* 22.0 22.0   ALKPHO 116  --  78   AST 10*  --  5*   ALT 15  --  14   BILT 0.3  --  0.2   TP 6.5  --  5.2*     @MG@    Lab Results   Component Value Date    INR 1.16 (H) 11/23/2018 PRN   Or      dextrose 50 % injection 50 mL 50 mL Intravenous Q15 Min PRN   Or      glucose (DEX4) oral liquid 30 g 30 g Oral Q15 Min PRN   Or      Glucose-Vitamin C (DEX-4) 4-6 GM-MG chewable tab 8 tablet 8 tablet Oral Q15 Min PRN   Insulin Aspart Pen (NO

## 2019-06-19 NOTE — PROGRESS NOTES
NURSING DISCHARGE NOTE    Discharged Rehab facility via Ambulance. Accompanied by Support staff  Belongings Taken by patient/family. Pt is assessed and ready for discharge. Instructions/follow up called to Rn at [facility]. To [facility] via ambulance.

## 2019-06-19 NOTE — CM/SW NOTE
MD orders received for discharge today. BRAYDON spoke with [facility] liaison, Beverli Burkitt who confirms bed available and pt accepted for return today. Clinical updates sent to [facility] via [address]. Resumption orders and clinical sent to [facility] via [address] as well.

## 2019-06-19 NOTE — PLAN OF CARE
Problem: RISK FOR INFECTION - ADULT  Goal: Absence of fever/infection during anticipated neutropenic period  Description  INTERVENTIONS  - Monitor WBC  - Administer growth factors as ordered  - Implement neutropenic guidelines  Outcome: Adequate for Disc liquid  - Double Swallow  - Offer pills one at a time, crush or deliver with applesauce as needed  - Discontinue feeding and notify MD (or speech pathologist) if coughing or persistent throat clearing or wet/gurgly vocal quality is noted   Outcome: Adequat

## 2019-06-19 NOTE — PLAN OF CARE
Patient is still confused, but mentation is improving. Patient is A/Ox2. Vitals stable, seizure precautions in place. Incontinent. Nectar thick liquids. Receiving IVF and ABX. Bedbound. Q2H turn. Will continue to monitor.      Problem: SAFETY ADULT - FALL

## 2019-06-19 NOTE — PROGRESS NOTES
[facility]  Progress Note    Stacy Fish Patient Status:  Inpatient    10/30/1955 MRN [id number]   [facility] 5NW-A Attending Taya Lau MD   [facility] Day # 4 PCP Je De Jesus MD         SUBJECTIVE:  Subjective:  Lety Weems 06/18/19 2127 06/19/19  0709   PGLU 109* 143* 212* 238* 93       No results for input(s): URINE, CULTI, BLDSMR in the last 168 hours.             Meds:     • amLODIPine Besylate  5 mg Oral Daily   • baclofen  20 mg Oral TID   • betamethasone valerate   Top consult  12. 12 pulm md f/u  See tests ordered,  Available and radiology reviewed  All consultant notes reviewed  Discussed with nursing on floor  dvt prophylaxis reviewed  PT and/or OT  Eliud Rahman MD

## 2019-06-20 NOTE — CM/SW NOTE
Patient discharged on 6/19/19 as previously planned.        06/20/19 0800   Discharge disposition   Expected discharge disposition Skilled Nurs   Name of Facillity/Home Care/Hospice   ([address])   Discharge transportation [employer]

## 2019-07-11 ENCOUNTER — HOSPITAL ENCOUNTER (EMERGENCY)
Facility: HOSPITAL | Age: 64
Discharge: HOME | End: 2019-07-11
Attending: EMERGENCY MEDICINE
Payer: MEDICARE

## 2019-07-11 VITALS
SYSTOLIC BLOOD PRESSURE: 167 MMHG | TEMPERATURE: 98 F | HEART RATE: 65 BPM | OXYGEN SATURATION: 97 % | WEIGHT: 174 LBS | DIASTOLIC BLOOD PRESSURE: 67 MMHG | HEIGHT: 64 IN | RESPIRATION RATE: 16 BRPM | BODY MASS INDEX: 29.71 KG/M2

## 2019-07-11 DIAGNOSIS — N18.9 CHRONIC KIDNEY FAILURE, UNSPECIFIED STAGE: Primary | ICD-10-CM

## 2019-07-11 LAB
ALBUMIN SERPL-MCNC: 2 G/DL (ref 3.4–5)
ALBUMIN/GLOB SERPL: 0.5 {RATIO} (ref 1–2)
ALP LIVER SERPL-CCNC: 76 U/L (ref 50–130)
ALT SERPL-CCNC: 11 U/L (ref 13–56)
ANION GAP SERPL CALC-SCNC: 7 MMOL/L (ref 0–18)
ANTIBODY SCREEN: POSITIVE
APTT PPP: 30.5 SECONDS (ref 25.4–36.1)
AST SERPL-CCNC: 4 U/L (ref 15–37)
ATRIAL RATE: 71 BPM
BASOPHILS # BLD AUTO: 0.02 X10(3) UL (ref 0–0.2)
BASOPHILS NFR BLD AUTO: 0.2 %
BILIRUB SERPL-MCNC: 0.2 MG/DL (ref 0.1–2)
BUN BLD-MCNC: 89 MG/DL (ref 7–18)
BUN/CREAT SERPL: 21.2 (ref 10–20)
CALCIUM BLD-MCNC: 8.8 MG/DL (ref 8.5–10.1)
CHLORIDE SERPL-SCNC: 107 MMOL/L (ref 98–112)
CO2 SERPL-SCNC: 22 MMOL/L (ref 21–32)
CREAT BLD-MCNC: 4.2 MG/DL (ref 0.55–1.02)
DEPRECATED RDW RBC AUTO: 49.3 FL (ref 35.1–46.3)
EOSINOPHIL # BLD AUTO: 0.36 X10(3) UL (ref 0–0.7)
EOSINOPHIL NFR BLD AUTO: 4.3 %
ERYTHROCYTE [DISTWIDTH] IN BLOOD BY AUTOMATED COUNT: 14.5 % (ref 11–15)
GLOBULIN PLAS-MCNC: 4.3 G/DL (ref 2.8–4.4)
GLUCOSE BLD-MCNC: 258 MG/DL (ref 70–99)
HCT VFR BLD AUTO: 25.6 % (ref 35–48)
HGB BLD-MCNC: 8.2 G/DL (ref 12–16)
IMM GRANULOCYTES # BLD AUTO: 0.18 X10(3) UL (ref 0–1)
IMM GRANULOCYTES NFR BLD: 2.2 %
INR BLD: 0.99 (ref 0.9–1.1)
LYMPHOCYTES # BLD AUTO: 1.08 X10(3) UL (ref 1–4)
LYMPHOCYTES NFR BLD AUTO: 12.9 %
M PROTEIN MFR SERPL ELPH: 6.3 G/DL (ref 6.4–8.2)
MCH RBC QN AUTO: 29.8 PG (ref 26–34)
MCHC RBC AUTO-ENTMCNC: 32 G/DL (ref 31–37)
MCV RBC AUTO: 93.1 FL (ref 80–100)
MONOCYTES # BLD AUTO: 0.68 X10(3) UL (ref 0.1–1)
MONOCYTES NFR BLD AUTO: 8.2 %
NEUTROPHILS # BLD AUTO: 6.02 X10 (3) UL (ref 1.5–7.7)
NEUTROPHILS # BLD AUTO: 6.02 X10(3) UL (ref 1.5–7.7)
NEUTROPHILS NFR BLD AUTO: 72.2 %
OSMOLALITY SERPL CALC.SUM OF ELEC: 318 MOSM/KG (ref 275–295)
P AXIS: 62 DEGREES
P-R INTERVAL: 188 MS
PLATELET # BLD AUTO: 282 10(3)UL (ref 150–450)
POTASSIUM SERPL-SCNC: 5.3 MMOL/L (ref 3.5–5.1)
PSA SERPL DL<=0.01 NG/ML-MCNC: 13.5 SECONDS (ref 12.5–14.7)
Q-T INTERVAL: 390 MS
QRS DURATION: 72 MS
QTC CALCULATION (BEZET): 423 MS
R AXIS: 31 DEGREES
RBC # BLD AUTO: 2.75 X10(6)UL (ref 3.8–5.3)
RH BLOOD TYPE: POSITIVE
SODIUM SERPL-SCNC: 136 MMOL/L (ref 136–145)
T AXIS: 46 DEGREES
TREATSERUM: 2
VENTRICULAR RATE: 71 BPM
WBC # BLD AUTO: 8.3 X10(3) UL (ref 4–11)

## 2019-07-11 PROCEDURE — 99284 EMERGENCY DEPT VISIT MOD MDM: CPT

## 2019-07-11 PROCEDURE — 36415 COLL VENOUS BLD VENIPUNCTURE: CPT

## 2019-07-11 PROCEDURE — 80053 COMPREHEN METABOLIC PANEL: CPT | Performed by: EMERGENCY MEDICINE

## 2019-07-11 PROCEDURE — 85730 THROMBOPLASTIN TIME PARTIAL: CPT | Performed by: EMERGENCY MEDICINE

## 2019-07-11 PROCEDURE — 93010 ELECTROCARDIOGRAM REPORT: CPT

## 2019-07-11 PROCEDURE — 86975 RBC SERUM PRETX INCUBJ DRUGS: CPT | Performed by: EMERGENCY MEDICINE

## 2019-07-11 PROCEDURE — 93005 ELECTROCARDIOGRAM TRACING: CPT

## 2019-07-11 PROCEDURE — 86850 RBC ANTIBODY SCREEN: CPT | Performed by: EMERGENCY MEDICINE

## 2019-07-11 PROCEDURE — 85025 COMPLETE CBC W/AUTO DIFF WBC: CPT | Performed by: EMERGENCY MEDICINE

## 2019-07-11 PROCEDURE — 86900 BLOOD TYPING SEROLOGIC ABO: CPT | Performed by: EMERGENCY MEDICINE

## 2019-07-11 PROCEDURE — 86870 RBC ANTIBODY IDENTIFICATION: CPT | Performed by: EMERGENCY MEDICINE

## 2019-07-11 PROCEDURE — 85610 PROTHROMBIN TIME: CPT | Performed by: EMERGENCY MEDICINE

## 2019-07-11 PROCEDURE — 86901 BLOOD TYPING SEROLOGIC RH(D): CPT | Performed by: EMERGENCY MEDICINE

## 2019-07-11 RX ORDER — GUAIFENESIN 600 MG
1200 TABLET, EXTENDED RELEASE 12 HR ORAL 2 TIMES DAILY
COMMUNITY
End: 2019-07-20

## 2019-07-11 RX ORDER — LEVOFLOXACIN 500 MG/1
500 TABLET, FILM COATED ORAL DAILY
COMMUNITY
Start: 2019-07-09 | End: 2019-07-19

## 2019-07-11 RX ORDER — SODIUM POLYSTYRENE SULFONATE 4.1 MEQ/G
15 POWDER, FOR SUSPENSION ORAL; RECTAL ONCE
Status: COMPLETED | OUTPATIENT
Start: 2019-07-11 | End: 2019-07-11

## 2019-07-11 RX ORDER — SODIUM POLYSTYRENE SULFONATE 4.1 MEQ/G
15 POWDER, FOR SUSPENSION ORAL; RECTAL DAILY
Status: ON HOLD | COMMUNITY
End: 2019-08-07

## 2019-07-11 NOTE — ED INITIAL ASSESSMENT (HPI)
Patient is here from [place]. Per medics patient is coming in for  Low Hemoglobin 7.4 and elevated K 5.4. Patient denies any complaints. Per medics patient's baseline is A/O x2.

## 2019-07-11 NOTE — ED NOTES
[facility] called to notify that patient is returning to facility. RN to RN report given to [address] at [place].

## 2019-07-12 NOTE — DISCHARGE SUMMARY
[facility]  Discharge Summary    Haritha Braden Patient Status:  Inpatient    10/30/1955 MRN [id number]   [facility] 5NW-A Attending No att. providers found   [facility] Day # 4 PCP Helen Vasquez MD     Date of Admission: 6/15/2019 without hematuria     Chronic kidney disease, unspecified CKD stage     Palliative care encounter     Counseling regarding advance care planning and goals of care      Reason for Admission: see below    Physical Exam: see below    Hospital Course:     SUBJ ALB 2.0* 1.7*                 Recent Labs   Lab 06/18/19  0733 06/18/19  1142 06/18/19  1539 06/18/19  2127 06/19/19  0709   PGLU 109* 143* 212* 238* 93         No results for input(s): URINE, CULTI, BLDSMR in the last 168 hours.                 Meds: stable  8. H/o CVA  9. RTA with hyperkalemia, on florinef, K improved  10. dvt px- heparin  11.  Palliative care consult  12. 12 pulm md f/u  See tests ordered,  Available and radiology reviewed  All consultant notes reviewed  Discussed with nursing on floo Historical    baclofen 20 MG Oral Tab  Take 1 tablet (20 mg total) by mouth 3 (three) times daily. , Print Script, Disp-90 tablet, R-0    calcium acetate 667 MG Oral Cap  Take 1 capsule by mouth 2 (two) times daily. , Historical    Multiple Vitamin (TAB-A-VI

## 2019-07-12 NOTE — ED PROVIDER NOTES
Patient Seen in: [facility] Emergency Department    History   Patient presents with:  Abnormal Labs    Stated Complaint: abnormal labs    HPI    Patient is a 30-year-old female comes emergency room for evaluation of anemia and hyperkalemia.   Patient All other systems reviewed and negative except as noted above.     Physical Exam     ED Triage Vitals [07/11/19 1432]   BP (!) 167/82   Pulse 71   Resp 18   Temp 98.3 °F (36.8 °C)   Temp src Temporal   SpO2 96 %   O2 Device None (Room air)       Current 2.75 (*)     HGB 8.2 (*)     HCT 25.6 (*)     RDW-SD 49.3 (*)     All other components within normal limits   PROTHROMBIN TIME (PT) - Normal   PTT, ACTIVATED - Normal   CBC WITH DIFFERENTIAL WITH PLATELET    Narrative:      The following orders were created opposed to intravascular lysis) is observed in transfusion reactions involving S antibodies due to the fact that these antibodies generally do not cause complement binding.     TRANSFUSION CONSIDERATIONS       Patients with anti-S must receive blood which l concerning for hyperkalemia and potassium less than 6. Patient given Kayexalate prior to discharge.   Patient be sent back to nursing facility per                    Disposition and Plan     Clinical Impression:  Chronic kidney failure, unspecified stage

## 2019-07-20 ENCOUNTER — APPOINTMENT (OUTPATIENT)
Dept: GENERAL RADIOLOGY | Facility: HOSPITAL | Age: 64
End: 2019-07-20
Attending: EMERGENCY MEDICINE
Payer: MEDICARE

## 2019-07-20 ENCOUNTER — HOSPITAL ENCOUNTER (EMERGENCY)
Facility: HOSPITAL | Age: 64
Discharge: NURSING, REHAB OR LONG-TERM CARE | End: 2019-07-20
Attending: EMERGENCY MEDICINE
Payer: MEDICARE

## 2019-07-20 VITALS
OXYGEN SATURATION: 100 % | RESPIRATION RATE: 13 BRPM | HEIGHT: 64 IN | SYSTOLIC BLOOD PRESSURE: 167 MMHG | DIASTOLIC BLOOD PRESSURE: 56 MMHG | TEMPERATURE: 97 F | BODY MASS INDEX: 29.7 KG/M2 | HEART RATE: 67 BPM | WEIGHT: 173.94 LBS

## 2019-07-20 DIAGNOSIS — E86.0 DEHYDRATION: Primary | ICD-10-CM

## 2019-07-20 LAB
ALBUMIN SERPL-MCNC: 1.8 G/DL (ref 3.4–5)
ALBUMIN/GLOB SERPL: 0.5 {RATIO} (ref 1–2)
ALP LIVER SERPL-CCNC: 73 U/L (ref 50–130)
ALT SERPL-CCNC: 9 U/L (ref 13–56)
ANION GAP SERPL CALC-SCNC: 10 MMOL/L (ref 0–18)
AST SERPL-CCNC: 8 U/L (ref 15–37)
BASOPHILS # BLD AUTO: 0.03 X10(3) UL (ref 0–0.2)
BASOPHILS NFR BLD AUTO: 0.4 %
BILIRUB SERPL-MCNC: 0.2 MG/DL (ref 0.1–2)
BUN BLD-MCNC: 79 MG/DL (ref 7–18)
BUN/CREAT SERPL: 20.5 (ref 10–20)
CALCIUM BLD-MCNC: 7.9 MG/DL (ref 8.5–10.1)
CHLORIDE SERPL-SCNC: 113 MMOL/L (ref 98–112)
CO2 SERPL-SCNC: 21 MMOL/L (ref 21–32)
CREAT BLD-MCNC: 3.86 MG/DL (ref 0.55–1.02)
DEPRECATED RDW RBC AUTO: 47.2 FL (ref 35.1–46.3)
EOSINOPHIL # BLD AUTO: 0.61 X10(3) UL (ref 0–0.7)
EOSINOPHIL NFR BLD AUTO: 8.1 %
ERYTHROCYTE [DISTWIDTH] IN BLOOD BY AUTOMATED COUNT: 13.9 % (ref 11–15)
GLOBULIN PLAS-MCNC: 4 G/DL (ref 2.8–4.4)
GLUCOSE BLD-MCNC: 143 MG/DL (ref 70–99)
HCT VFR BLD AUTO: 25.2 % (ref 35–48)
HGB BLD-MCNC: 7.9 G/DL (ref 12–16)
IMM GRANULOCYTES # BLD AUTO: 0.35 X10(3) UL (ref 0–1)
IMM GRANULOCYTES NFR BLD: 4.7 %
LYMPHOCYTES # BLD AUTO: 1.11 X10(3) UL (ref 1–4)
LYMPHOCYTES NFR BLD AUTO: 14.8 %
M PROTEIN MFR SERPL ELPH: 5.8 G/DL (ref 6.4–8.2)
MCH RBC QN AUTO: 29.2 PG (ref 26–34)
MCHC RBC AUTO-ENTMCNC: 31.3 G/DL (ref 31–37)
MCV RBC AUTO: 93 FL (ref 80–100)
MONOCYTES # BLD AUTO: 0.5 X10(3) UL (ref 0.1–1)
MONOCYTES NFR BLD AUTO: 6.7 %
NEUTROPHILS # BLD AUTO: 4.91 X10 (3) UL (ref 1.5–7.7)
NEUTROPHILS # BLD AUTO: 4.91 X10(3) UL (ref 1.5–7.7)
NEUTROPHILS NFR BLD AUTO: 65.3 %
OSMOLALITY SERPL CALC.SUM OF ELEC: 324 MOSM/KG (ref 275–295)
PLATELET # BLD AUTO: 339 10(3)UL (ref 150–450)
POTASSIUM SERPL-SCNC: 4.9 MMOL/L (ref 3.5–5.1)
RBC # BLD AUTO: 2.71 X10(6)UL (ref 3.8–5.3)
SODIUM SERPL-SCNC: 144 MMOL/L (ref 136–145)
WBC # BLD AUTO: 7.5 X10(3) UL (ref 4–11)

## 2019-07-20 PROCEDURE — 96361 HYDRATE IV INFUSION ADD-ON: CPT

## 2019-07-20 PROCEDURE — 80053 COMPREHEN METABOLIC PANEL: CPT | Performed by: EMERGENCY MEDICINE

## 2019-07-20 PROCEDURE — 85025 COMPLETE CBC W/AUTO DIFF WBC: CPT | Performed by: EMERGENCY MEDICINE

## 2019-07-20 PROCEDURE — 85025 COMPLETE CBC W/AUTO DIFF WBC: CPT

## 2019-07-20 PROCEDURE — 99285 EMERGENCY DEPT VISIT HI MDM: CPT

## 2019-07-20 PROCEDURE — 71045 X-RAY EXAM CHEST 1 VIEW: CPT | Performed by: EMERGENCY MEDICINE

## 2019-07-20 PROCEDURE — 80053 COMPREHEN METABOLIC PANEL: CPT

## 2019-07-20 PROCEDURE — 93010 ELECTROCARDIOGRAM REPORT: CPT

## 2019-07-20 PROCEDURE — 99284 EMERGENCY DEPT VISIT MOD MDM: CPT

## 2019-07-20 PROCEDURE — 96360 HYDRATION IV INFUSION INIT: CPT

## 2019-07-20 PROCEDURE — 93005 ELECTROCARDIOGRAM TRACING: CPT

## 2019-07-20 RX ORDER — DEXTROSE MONOHYDRATE 25 G/50ML
50 INJECTION, SOLUTION INTRAVENOUS ONCE
Status: DISCONTINUED | OUTPATIENT
Start: 2019-07-20 | End: 2019-07-20

## 2019-07-20 RX ORDER — MELATONIN
325 2 TIMES DAILY WITH MEALS
COMMUNITY

## 2019-07-20 RX ORDER — SODIUM POLYSTYRENE SULFONATE 4.1 MEQ/G
15 POWDER, FOR SUSPENSION ORAL; RECTAL ONCE
Status: DISCONTINUED | OUTPATIENT
Start: 2019-07-20 | End: 2019-07-20

## 2019-07-20 RX ORDER — PIMECROLIMUS 10 MG/G
CREAM TOPICAL DAILY
Status: ON HOLD | COMMUNITY
End: 2019-08-07

## 2019-07-20 RX ORDER — SODIUM CHLORIDE 9 MG/ML
INJECTION, SOLUTION INTRAVENOUS CONTINUOUS
Status: DISCONTINUED | OUTPATIENT
Start: 2019-07-20 | End: 2019-07-20

## 2019-07-20 NOTE — ED NOTES
K 4.9. Hyperkalemia meds dc'd after discussion with Dr. Roland Dennis.   Pt given ice chips as per request.

## 2019-07-20 NOTE — ED PROVIDER NOTES
Patient Seen in: [facility] Emergency Department    History   Patient presents with:  Abnormal Result (metabolic, cardiac)    Stated Complaint: abnormal labs    HPI    Patient sent for abnormal labs from nursing facility for abnormal labs - denies a complaint: abnormal labs  Other systems are as noted in HPI. Constitutional and vital signs reviewed. All other systems reviewed and negative except as noted above.     Physical Exam     ED Triage Vitals   BP 07/20/19 1243 146/56   Pulse 07/20/19 1243 PLATELET.   Procedure                               Abnormality         Status                     ---------                               -----------         ------                     CBC W/ DIFFERENTIAL[[id number]]          Abnormal            Final resul Patient will follow-up with Dr. Corwin Roberts but does not require dialysis or further intervention at this time.   She was given some IV fluids as she is not living thickened liquids and may therefore be somewhat dehydrated secondary to that              Dispositio

## 2019-07-20 NOTE — ED NOTES
Incontinent of a small amount of stool. Sujata care performed, new brief placed. Turned onto left side as per request.   Awaiting arrival of EMS.

## 2019-07-20 NOTE — ED NOTES
Report called to JOSEY ALVAREZ [facility] at [facility]. Advised patient is returning to the facility and current lab results.

## 2019-07-20 NOTE — ED INITIAL ASSESSMENT (HPI)
[facility] for abnormal labs. K 5.5, creatinine, 3.89, and BUN 74.

## 2019-07-21 LAB
ATRIAL RATE: 64 BPM
P AXIS: 41 DEGREES
P-R INTERVAL: 196 MS
Q-T INTERVAL: 402 MS
QRS DURATION: 76 MS
QTC CALCULATION (BEZET): 414 MS
R AXIS: 25 DEGREES
T AXIS: 49 DEGREES
VENTRICULAR RATE: 64 BPM

## 2019-07-26 ENCOUNTER — HOSPITAL ENCOUNTER (EMERGENCY)
Facility: HOSPITAL | Age: 64
Discharge: STILL ADMITTED OR ADMINISTRATIVE | End: 2019-07-26
Payer: MEDICARE

## 2019-07-26 NOTE — CM/SW NOTE
TC to [employer] appointed guardian to confirm he wanted GT placed. Per George Chaves, GT placement is being cancelled. He just got off phone with [address]. TC to PANFILO Castillo at [address]. She was just made aware of this.  Patient not coming to ED

## 2019-07-26 NOTE — CM/SW NOTE
TC back to [facility] Castillo to get more info after chart review indicates patient's most recent POLST indicates:     Full Code, Full treatment, NO medically administered means of nutrition.     Pam Castillo confirmed that her  Analia Lobo spoke with [facility]

## 2019-07-31 ENCOUNTER — HOSPITAL ENCOUNTER (EMERGENCY)
Facility: HOSPITAL | Age: 64
Discharge: HOME | End: 2019-07-31
Attending: EMERGENCY MEDICINE
Payer: MEDICARE

## 2019-07-31 VITALS
SYSTOLIC BLOOD PRESSURE: 153 MMHG | HEIGHT: 64 IN | BODY MASS INDEX: 29.71 KG/M2 | WEIGHT: 174 LBS | TEMPERATURE: 98 F | DIASTOLIC BLOOD PRESSURE: 99 MMHG | RESPIRATION RATE: 20 BRPM | OXYGEN SATURATION: 99 % | HEART RATE: 67 BPM

## 2019-07-31 DIAGNOSIS — D64.9 ANEMIA, UNSPECIFIED TYPE: Primary | ICD-10-CM

## 2019-07-31 LAB
ALBUMIN SERPL-MCNC: 1.8 G/DL (ref 3.4–5)
ALBUMIN/GLOB SERPL: 0.5 {RATIO} (ref 1–2)
ALP LIVER SERPL-CCNC: 68 U/L (ref 50–130)
ALT SERPL-CCNC: 9 U/L (ref 13–56)
ANION GAP SERPL CALC-SCNC: 8 MMOL/L (ref 0–18)
ANTIBODY SCREEN: POSITIVE
AST SERPL-CCNC: 6 U/L (ref 15–37)
BASOPHILS # BLD AUTO: 0.03 X10(3) UL (ref 0–0.2)
BASOPHILS NFR BLD AUTO: 0.5 %
BILIRUB SERPL-MCNC: 0.2 MG/DL (ref 0.1–2)
BUN BLD-MCNC: 75 MG/DL (ref 7–18)
BUN/CREAT SERPL: 18.6 (ref 10–20)
CALCIUM BLD-MCNC: 8.4 MG/DL (ref 8.5–10.1)
CHLORIDE SERPL-SCNC: 114 MMOL/L (ref 98–112)
CO2 SERPL-SCNC: 19 MMOL/L (ref 21–32)
CREAT BLD-MCNC: 4.04 MG/DL (ref 0.55–1.02)
DEPRECATED RDW RBC AUTO: 48.4 FL (ref 35.1–46.3)
EOSINOPHIL # BLD AUTO: 0.46 X10(3) UL (ref 0–0.7)
EOSINOPHIL NFR BLD AUTO: 7.3 %
ERYTHROCYTE [DISTWIDTH] IN BLOOD BY AUTOMATED COUNT: 14.5 % (ref 11–15)
GLOBULIN PLAS-MCNC: 3.9 G/DL (ref 2.8–4.4)
GLUCOSE BLD-MCNC: 77 MG/DL (ref 70–99)
HCT VFR BLD AUTO: 23.6 % (ref 35–48)
HGB BLD-MCNC: 7.6 G/DL (ref 12–16)
IMM GRANULOCYTES # BLD AUTO: 0.09 X10(3) UL (ref 0–1)
IMM GRANULOCYTES NFR BLD: 1.4 %
LYMPHOCYTES # BLD AUTO: 1.19 X10(3) UL (ref 1–4)
LYMPHOCYTES NFR BLD AUTO: 19 %
M PROTEIN MFR SERPL ELPH: 5.7 G/DL (ref 6.4–8.2)
MCH RBC QN AUTO: 29.6 PG (ref 26–34)
MCHC RBC AUTO-ENTMCNC: 32.2 G/DL (ref 31–37)
MCV RBC AUTO: 91.8 FL (ref 80–100)
MONOCYTES # BLD AUTO: 0.44 X10(3) UL (ref 0.1–1)
MONOCYTES NFR BLD AUTO: 7 %
NEUTROPHILS # BLD AUTO: 4.06 X10 (3) UL (ref 1.5–7.7)
NEUTROPHILS # BLD AUTO: 4.06 X10(3) UL (ref 1.5–7.7)
NEUTROPHILS NFR BLD AUTO: 64.8 %
OSMOLALITY SERPL CALC.SUM OF ELEC: 313 MOSM/KG (ref 275–295)
PLATELET # BLD AUTO: 315 10(3)UL (ref 150–450)
POTASSIUM SERPL-SCNC: 5 MMOL/L (ref 3.5–5.1)
RBC # BLD AUTO: 2.57 X10(6)UL (ref 3.8–5.3)
RH BLOOD TYPE: POSITIVE
SODIUM SERPL-SCNC: 141 MMOL/L (ref 136–145)
WBC # BLD AUTO: 6.3 X10(3) UL (ref 4–11)

## 2019-07-31 PROCEDURE — 99284 EMERGENCY DEPT VISIT MOD MDM: CPT

## 2019-07-31 PROCEDURE — 99283 EMERGENCY DEPT VISIT LOW MDM: CPT

## 2019-07-31 PROCEDURE — 86870 RBC ANTIBODY IDENTIFICATION: CPT | Performed by: EMERGENCY MEDICINE

## 2019-07-31 PROCEDURE — 86900 BLOOD TYPING SEROLOGIC ABO: CPT | Performed by: EMERGENCY MEDICINE

## 2019-07-31 PROCEDURE — 86901 BLOOD TYPING SEROLOGIC RH(D): CPT | Performed by: EMERGENCY MEDICINE

## 2019-07-31 PROCEDURE — 80053 COMPREHEN METABOLIC PANEL: CPT | Performed by: EMERGENCY MEDICINE

## 2019-07-31 PROCEDURE — 85025 COMPLETE CBC W/AUTO DIFF WBC: CPT | Performed by: EMERGENCY MEDICINE

## 2019-07-31 PROCEDURE — 86850 RBC ANTIBODY SCREEN: CPT | Performed by: EMERGENCY MEDICINE

## 2019-07-31 PROCEDURE — 36415 COLL VENOUS BLD VENIPUNCTURE: CPT

## 2019-07-31 NOTE — ED PROVIDER NOTES
Patient Seen in: [facility] Emergency Department    History   Patient presents with:  Abnormal Result (metabolic, cardiac)    Stated Complaint: Low Hgb (6.6)    HPI    68-year-old female was sent to the emergency department by ambulance from a local Current:BP (!) 153/99   Pulse 67   Temp 97.5 °F (36.4 °C) (Temporal)   Resp 20   Ht 162.6 cm (5' 4\")   Wt 78.9 kg   SpO2 99%   BMI 29.87 kg/m²         Physical Exam    General appearance: This is a middle-aged female lying on a gurney.   She is awake result                 Please view results for these tests on the individual orders.    ANTIBODY IDENTIFICATION    Narrative:     ANTI-S    CHARACTERISTICS       Anti-S is usually an immune (IgG) antibody reactive at 37 degree C (antiglobulin phase) though disease.               Disposition and Plan     Clinical Impression:  Anemia, unspecified type  (primary encounter diagnosis)    Disposition:  Discharge  7/31/2019 10:43 am    Follow-up:  Kellee Pierre, [address]

## 2019-07-31 NOTE — ED INITIAL ASSESSMENT (HPI)
Pt presented to ED via [facility] c/o low hgb (6.6) pt appears pale but is aa&ox4 denies tarry stools and hematemesis.

## 2019-08-06 ENCOUNTER — HOSPITAL ENCOUNTER (OUTPATIENT)
Facility: HOSPITAL | Age: 64
Setting detail: OBSERVATION
Discharge: NURSING, REHAB OR LONG-TERM CARE | End: 2019-08-09
Attending: EMERGENCY MEDICINE | Admitting: INTERNAL MEDICINE
Payer: MEDICARE

## 2019-08-06 ENCOUNTER — APPOINTMENT (OUTPATIENT)
Dept: GENERAL RADIOLOGY | Facility: HOSPITAL | Age: 64
End: 2019-08-06
Attending: EMERGENCY MEDICINE
Payer: MEDICARE

## 2019-08-06 DIAGNOSIS — R53.1 WEAKNESS GENERALIZED: ICD-10-CM

## 2019-08-06 DIAGNOSIS — D64.9 ANEMIA, UNSPECIFIED TYPE: ICD-10-CM

## 2019-08-06 DIAGNOSIS — N18.4 CHRONIC RENAL FAILURE, STAGE 4 (SEVERE) (HCC): Primary | ICD-10-CM

## 2019-08-06 PROBLEM — N17.9 ACUTE KIDNEY INJURY (HCC): Status: ACTIVE | Noted: 2019-08-06

## 2019-08-06 PROBLEM — N17.9 ACUTE RENAL FAILURE (ARF) (HCC): Status: ACTIVE | Noted: 2019-08-06

## 2019-08-06 LAB
ALBUMIN SERPL-MCNC: 1.9 G/DL (ref 3.4–5)
ALBUMIN/GLOB SERPL: 0.5 {RATIO} (ref 1–2)
ALP LIVER SERPL-CCNC: 68 U/L (ref 50–130)
ALT SERPL-CCNC: 7 U/L (ref 13–56)
ANION GAP SERPL CALC-SCNC: 9 MMOL/L (ref 0–18)
AST SERPL-CCNC: <3 U/L (ref 15–37)
BASOPHILS # BLD AUTO: 0.02 X10(3) UL (ref 0–0.2)
BASOPHILS NFR BLD AUTO: 0.2 %
BILIRUB SERPL-MCNC: 0.2 MG/DL (ref 0.1–2)
BUN BLD-MCNC: 77 MG/DL (ref 7–18)
BUN/CREAT SERPL: 18.3 (ref 10–20)
CALCIUM BLD-MCNC: 8.2 MG/DL (ref 8.5–10.1)
CHLORIDE SERPL-SCNC: 112 MMOL/L (ref 98–112)
CO2 SERPL-SCNC: 21 MMOL/L (ref 21–32)
CREAT BLD-MCNC: 4.2 MG/DL (ref 0.55–1.02)
DEPRECATED RDW RBC AUTO: 49.3 FL (ref 35.1–46.3)
EOSINOPHIL # BLD AUTO: 0.6 X10(3) UL (ref 0–0.7)
EOSINOPHIL NFR BLD AUTO: 7 %
ERYTHROCYTE [DISTWIDTH] IN BLOOD BY AUTOMATED COUNT: 14.6 % (ref 11–15)
GLOBULIN PLAS-MCNC: 3.8 G/DL (ref 2.8–4.4)
GLUCOSE BLD-MCNC: 144 MG/DL (ref 70–99)
GLUCOSE BLD-MCNC: 79 MG/DL (ref 70–99)
HCT VFR BLD AUTO: 23 % (ref 35–48)
HGB BLD-MCNC: 7.4 G/DL (ref 12–16)
IMM GRANULOCYTES # BLD AUTO: 0.08 X10(3) UL (ref 0–1)
IMM GRANULOCYTES NFR BLD: 0.9 %
LYMPHOCYTES # BLD AUTO: 0.78 X10(3) UL (ref 1–4)
LYMPHOCYTES NFR BLD AUTO: 9 %
M PROTEIN MFR SERPL ELPH: 5.7 G/DL (ref 6.4–8.2)
MCH RBC QN AUTO: 29.6 PG (ref 26–34)
MCHC RBC AUTO-ENTMCNC: 32.2 G/DL (ref 31–37)
MCV RBC AUTO: 92 FL (ref 80–100)
MONOCYTES # BLD AUTO: 0.72 X10(3) UL (ref 0.1–1)
MONOCYTES NFR BLD AUTO: 8.4 %
NEUTROPHILS # BLD AUTO: 6.42 X10 (3) UL (ref 1.5–7.7)
NEUTROPHILS # BLD AUTO: 6.42 X10(3) UL (ref 1.5–7.7)
NEUTROPHILS NFR BLD AUTO: 74.5 %
OSMOLALITY SERPL CALC.SUM OF ELEC: 320 MOSM/KG (ref 275–295)
PLATELET # BLD AUTO: 258 10(3)UL (ref 150–450)
POTASSIUM SERPL-SCNC: 4.5 MMOL/L (ref 3.5–5.1)
RBC # BLD AUTO: 2.5 X10(6)UL (ref 3.8–5.3)
SODIUM SERPL-SCNC: 142 MMOL/L (ref 136–145)
WBC # BLD AUTO: 8.6 X10(3) UL (ref 4–11)

## 2019-08-06 PROCEDURE — 71045 X-RAY EXAM CHEST 1 VIEW: CPT | Performed by: EMERGENCY MEDICINE

## 2019-08-06 RX ORDER — PIMECROLIMUS 10 MG/G
CREAM TOPICAL DAILY
Status: DISCONTINUED | OUTPATIENT
Start: 2019-08-06 | End: 2019-08-09

## 2019-08-06 RX ORDER — HEPARIN SODIUM 5000 [USP'U]/ML
5000 INJECTION, SOLUTION INTRAVENOUS; SUBCUTANEOUS EVERY 12 HOURS SCHEDULED
Status: DISCONTINUED | OUTPATIENT
Start: 2019-08-06 | End: 2019-08-09

## 2019-08-06 RX ORDER — LABETALOL 200 MG/1
200 TABLET, FILM COATED ORAL 2 TIMES DAILY
Status: DISCONTINUED | OUTPATIENT
Start: 2019-08-06 | End: 2019-08-09

## 2019-08-06 RX ORDER — KETOCONAZOLE 20 MG/ML
SHAMPOO TOPICAL
Status: DISCONTINUED | OUTPATIENT
Start: 2019-08-06 | End: 2019-08-07

## 2019-08-06 RX ORDER — SODIUM CHLORIDE 9 MG/ML
INJECTION, SOLUTION INTRAVENOUS CONTINUOUS
Status: ACTIVE | OUTPATIENT
Start: 2019-08-06 | End: 2019-08-06

## 2019-08-06 RX ORDER — MELATONIN
325
Status: DISCONTINUED | OUTPATIENT
Start: 2019-08-07 | End: 2019-08-07

## 2019-08-06 RX ORDER — GLIPIZIDE 5 MG/1
5 TABLET ORAL
Status: DISCONTINUED | OUTPATIENT
Start: 2019-08-07 | End: 2019-08-07

## 2019-08-06 RX ORDER — DOCUSATE SODIUM 100 MG/1
100 CAPSULE, LIQUID FILLED ORAL 2 TIMES DAILY
Status: DISCONTINUED | OUTPATIENT
Start: 2019-08-06 | End: 2019-08-09

## 2019-08-06 RX ORDER — HYDROMORPHONE HYDROCHLORIDE 1 MG/ML
0.5 INJECTION, SOLUTION INTRAMUSCULAR; INTRAVENOUS; SUBCUTANEOUS EVERY 30 MIN PRN
Status: ACTIVE | OUTPATIENT
Start: 2019-08-06 | End: 2019-08-06

## 2019-08-06 RX ORDER — BISACODYL 10 MG
10 SUPPOSITORY, RECTAL RECTAL DAILY PRN
Status: DISCONTINUED | OUTPATIENT
Start: 2019-08-06 | End: 2019-08-09

## 2019-08-06 RX ORDER — RISPERIDONE 0.5 MG/1
0.5 TABLET, FILM COATED ORAL 2 TIMES DAILY
Status: DISCONTINUED | OUTPATIENT
Start: 2019-08-06 | End: 2019-08-09

## 2019-08-06 RX ORDER — ACETAMINOPHEN 325 MG/1
650 TABLET ORAL EVERY 6 HOURS PRN
Status: DISCONTINUED | OUTPATIENT
Start: 2019-08-06 | End: 2019-08-09

## 2019-08-06 RX ORDER — BACLOFEN 10 MG/1
20 TABLET ORAL 3 TIMES DAILY
Status: DISCONTINUED | OUTPATIENT
Start: 2019-08-06 | End: 2019-08-09

## 2019-08-06 RX ORDER — DOXEPIN HYDROCHLORIDE 50 MG/1
1 CAPSULE ORAL DAILY
Status: DISCONTINUED | OUTPATIENT
Start: 2019-08-07 | End: 2019-08-09

## 2019-08-06 RX ORDER — HYDROCODONE BITARTRATE AND ACETAMINOPHEN 5; 325 MG/1; MG/1
1 TABLET ORAL EVERY 6 HOURS PRN
Status: DISCONTINUED | OUTPATIENT
Start: 2019-08-06 | End: 2019-08-09

## 2019-08-06 RX ORDER — NYSTATIN 100000 U/G
1 CREAM TOPICAL 2 TIMES DAILY
Status: DISCONTINUED | OUTPATIENT
Start: 2019-08-06 | End: 2019-08-09

## 2019-08-06 RX ORDER — CALCIUM ACETATE 667 MG/1
1 CAPSULE ORAL 2 TIMES DAILY
Status: DISCONTINUED | OUTPATIENT
Start: 2019-08-06 | End: 2019-08-09

## 2019-08-06 RX ORDER — DIPHENHYDRAMINE HCL 25 MG
25 CAPSULE ORAL EVERY 6 HOURS PRN
Status: DISCONTINUED | OUTPATIENT
Start: 2019-08-06 | End: 2019-08-09

## 2019-08-06 RX ORDER — FOLIC ACID 1 MG/1
1 TABLET ORAL DAILY
COMMUNITY

## 2019-08-06 RX ORDER — LEVETIRACETAM 500 MG/1
1000 TABLET ORAL DAILY
Status: DISCONTINUED | OUTPATIENT
Start: 2019-08-07 | End: 2019-08-09

## 2019-08-06 RX ORDER — FOLIC ACID 1 MG/1
1 TABLET ORAL DAILY
Status: DISCONTINUED | OUTPATIENT
Start: 2019-08-07 | End: 2019-08-09

## 2019-08-06 RX ORDER — SODIUM BICARBONATE 325 MG/1
650 TABLET ORAL 2 TIMES DAILY
Status: DISCONTINUED | OUTPATIENT
Start: 2019-08-06 | End: 2019-08-09

## 2019-08-06 RX ORDER — FLUDROCORTISONE ACETATE 0.1 MG/1
0.1 TABLET ORAL DAILY
Status: DISCONTINUED | OUTPATIENT
Start: 2019-08-07 | End: 2019-08-07

## 2019-08-06 RX ORDER — ESCITALOPRAM OXALATE 20 MG/1
20 TABLET ORAL DAILY
Status: DISCONTINUED | OUTPATIENT
Start: 2019-08-07 | End: 2019-08-09

## 2019-08-06 RX ORDER — SODIUM POLYSTYRENE SULFONATE 4.1 MEQ/G
15 POWDER, FOR SUSPENSION ORAL; RECTAL DAILY
Status: DISCONTINUED | OUTPATIENT
Start: 2019-08-07 | End: 2019-08-07

## 2019-08-06 RX ORDER — ONDANSETRON 2 MG/ML
4 INJECTION INTRAMUSCULAR; INTRAVENOUS EVERY 4 HOURS PRN
Status: DISCONTINUED | OUTPATIENT
Start: 2019-08-06 | End: 2019-08-09

## 2019-08-06 RX ORDER — AMLODIPINE BESYLATE 5 MG/1
5 TABLET ORAL DAILY
Status: DISCONTINUED | OUTPATIENT
Start: 2019-08-07 | End: 2019-08-09

## 2019-08-06 NOTE — ED INITIAL ASSESSMENT (HPI)
Pt arrives from [facility] with abnormal labs, lab sheet sent with pt was incorrect pt.  Talked with RN at [facility], concerning labs were Hgb 7.3, Hematocrit 24.7, BUN 78, and Creatinine 4.22

## 2019-08-07 PROBLEM — Z51.5 PALLIATIVE CARE BY SPECIALIST: Status: ACTIVE | Noted: 2019-08-07

## 2019-08-07 PROBLEM — Z71.89 GOALS OF CARE, COUNSELING/DISCUSSION: Status: ACTIVE | Noted: 2019-08-07

## 2019-08-07 LAB
GLUCOSE BLD-MCNC: 116 MG/DL (ref 70–99)
GLUCOSE BLD-MCNC: 124 MG/DL (ref 70–99)
GLUCOSE BLD-MCNC: 175 MG/DL (ref 70–99)

## 2019-08-07 PROCEDURE — 99202 OFFICE O/P NEW SF 15 MIN: CPT | Performed by: CLINICAL NURSE SPECIALIST

## 2019-08-07 PROCEDURE — 99214 OFFICE O/P EST MOD 30 MIN: CPT | Performed by: INTERNAL MEDICINE

## 2019-08-07 RX ORDER — DIPHENHYDRAMINE HCL 25 MG
25 CAPSULE ORAL EVERY 6 HOURS PRN
Status: DISCONTINUED | OUTPATIENT
Start: 2019-08-07 | End: 2019-08-07

## 2019-08-07 RX ORDER — DIPHENHYDRAMINE HCL 25 MG
25 TABLET ORAL EVERY 6 HOURS PRN
COMMUNITY

## 2019-08-07 RX ORDER — MELATONIN
325 2 TIMES DAILY WITH MEALS
Status: DISCONTINUED | OUTPATIENT
Start: 2019-08-07 | End: 2019-08-09

## 2019-08-07 RX ORDER — ESCITALOPRAM OXALATE 10 MG/1
10 TABLET ORAL DAILY
Status: DISCONTINUED | OUTPATIENT
Start: 2019-08-07 | End: 2019-08-07

## 2019-08-07 RX ORDER — IPRATROPIUM BROMIDE AND ALBUTEROL SULFATE 2.5; .5 MG/3ML; MG/3ML
3 SOLUTION RESPIRATORY (INHALATION) EVERY 6 HOURS PRN
Status: DISCONTINUED | OUTPATIENT
Start: 2019-08-07 | End: 2019-08-09

## 2019-08-07 RX ORDER — IPRATROPIUM BROMIDE AND ALBUTEROL SULFATE 2.5; .5 MG/3ML; MG/3ML
3 SOLUTION RESPIRATORY (INHALATION) EVERY 6 HOURS PRN
COMMUNITY

## 2019-08-07 RX ORDER — FLUTICASONE PROPIONATE AND SALMETEROL 113; 14 UG/1; UG/1
1 POWDER, METERED RESPIRATORY (INHALATION) 2 TIMES DAILY
COMMUNITY

## 2019-08-07 RX ORDER — CITALOPRAM 20 MG/1
20 TABLET ORAL DAILY
COMMUNITY

## 2019-08-07 NOTE — H&P
[facility]    PATIENT'S NAME: Regan Mckeon   ATTENDING PHYSICIAN: Gerald Black M.D.    PATIENT ACCOUNT#:   [de-identified]    LOCATION:  [address] [place] [address]  MEDICAL RECORD #:   [id number]       YOB: 1955  ADMISSION DATE:       08/06/2 Regular rhythm. No S3.  ABDOMEN:  Bowel sounds present. Soft, nontender. NEUROLOGIC:  Generalized weakness. Alert x2. EXTREMITIES:  No cyanosis or icterus. JOINTS:  No apparent acute fracture.   SKIN:  Defer to Nursing for assessment, management, and

## 2019-08-07 NOTE — DIETARY NOTE
[facility]    NUTRITION INITIAL ASSESSMENT    Pt does not meet malnutrition criteria. NUTRITION DIAGNOSIS/PROBLEM: No nutrition diagnosis at this time. NUTRITION INTERVENTION:        1. Meal and Snacks - monitor patient po intake.  Encourage adeq Unremarkable  07/31/19 : 78.9 kg (174 lb)  07/20/19 : 78.9 kg (173 lb 15.1 oz)  06/02/19 : 77.1 kg (170 lb)  05/31/19 : 81.6 kg (180 lb)  04/05/19 : 81.7 kg (180 lb 1.6 oz)  03/18/19 : 74.9 kg (165 lb 2 oz)  12/22/18 : 74.9 kg (165 lb 3.2 oz)  11/27/18 : 8

## 2019-08-07 NOTE — PLAN OF CARE
Spoke to Dr Beti Valdivia. Informed MD that Dr Hossein Myers saw patient & has basically signed off since he thinks patient is stable,labs are okay & patient is not dehydrated.  Dr Beti Valdivia told RN plan to start calorie count & if result is poor then patient will have to be re

## 2019-08-07 NOTE — CONSULTS
[facility], [facility] [address]  [id number]  Hospital Day #0  Date of Consult: 08/07/19  Patient seen at: [facility]    Reason for Consultation:      Consult requested by Dr. Rd Little for evaluation of pa had a full meal for lunch  Pain: reporting pain in her butt, states she has a sore there.      Review of pertinent medication requirements in past 24 hours:     Tylenol prn = none  Norco 5/325 q6hrs prn = none    Social History:      Marital Status:  UNIT/ML injection 5,000 Units 5,000 Units Subcutaneous 2 times per day   HYDROcodone-acetaminophen (NORCO) 5-325 MG per tab 1 tablet 1 tablet Oral Q6H PRN   Labetalol HCl (NORMODYNE) tab 200 mg 200 mg Oral BID   levETIRAcetam (KEPPRA) tab 1,000 mg 1,000 mg present. Skin shiny, scabbed scratch marks noted BLE. Reddness noted R calf. Neurologic: Alert and oriented to person, place and time   Psychiatric: Mood; no agitation or anxiety, drowsy  Skin: pale, warm and dry.     Palliative Performance Scale : 30% was able to state the reason for her admission \"abnormal labs\" but stated that she didn't feel bad then and feels fine now. She verified that she wanted to continue to receive treatment for her acute/chronic conditions.      Advance Care Planning counseli Severe episode of recurrent major depressive disorder, without psychotic features ([address]     UTI (urinary tract infection), bacterial     Visual hallucination     Benign essential HTN     Acute renal failure superimposed on chronic kidney disease ([address] consult; >50% was spent counseling and coordinating care. Thank you for allowing the Palliative Care Team to participate in the care of your patient.       As goals of care appear to be unchanged from previous visits we will continue to follow peripherally

## 2019-08-07 NOTE — PLAN OF CARE
Drowsy but arousable & verbally responsive,alert & oriented,forgetful & some confusion at times. Able to make needs known. Compliant w/ all medications. No complaints of pain,no SOB. Kept clean & dry. Turned & repositioned at intervals.  Monitoring food/flu

## 2019-08-07 NOTE — BH PROGRESS NOTE
[facility] Resource Referral Counselor Note    Nelly Iglesias Patient Status:  Observation    10/30/1955 MRN [id number]   [facility] 4NW-A Attending Erin Bean MD   Hosp Day # 0 PCP Osmani Hdez MD       S(subjective)

## 2019-08-07 NOTE — CM/SW NOTE
08/07/19 0900   CM/SW Referral Data   Referral Source Social Work (self-referral)   Reason for Referral Discharge planning   Informant Patient   Patient Info   Patient's Mental Status Alert   Patient's [facility]   Pt is known t

## 2019-08-07 NOTE — PLAN OF CARE
Attempted to straight cath resident to collect urine specimen by 2 RNs but unable to obtain specimen, no return when catheter was inserted. Unable to send clean catch urine since patient had BM mixed with the urine obtained.

## 2019-08-07 NOTE — ED PROVIDER NOTES
Patient Seen in: [facility] 4nw-a    History   Patient presents with:  Abnormal Result (metabolic, cardiac)    Stated Complaint: abnormal labs    HPI    62-year-old female presented to the emergency department with report of abnormal laboratories. All other systems reviewed and negative except as noted above.     Physical Exam     ED Triage Vitals [08/06/19 1445]   /58   Pulse 64   Resp 18   Temp 98 °F (36.7 °C)   Temp src Temporal   SpO2 96 %   O2 Device None (Room air)       Current:BP 15 ---------                               -----------         ------                     CBC W/ DIFFERENTIAL[[id number]]          Abnormal            Final result                 Please view results for these tests on the individual orders.

## 2019-08-07 NOTE — PLAN OF CARE
Patient has order for calorie count per Dr Brizuela Doing order. MD claims patient has failure to thrive,not eating at the nursing home & non compliant w/ dialysis. Nutritionist was consulted as well to eval patient per protocol. Nutritionist called Robin,spoke to

## 2019-08-07 NOTE — PLAN OF CARE
Pt drowsy and oriented x3 with no complaints of pain. Pt blood glucose 79 after admission.  Dr. Camryn Puente notified and received orders for diet, however, explained that pt should be NPO after midnight until Dr. Dayne Lunsford assesses pt just in case Dr. Dayne Lunsford decide maintained  Description  INTERVENTIONS:  - Monitor labs and assess for signs and symptoms of volume excess or deficit  - Monitor intake, output and patient weight  - Monitor urine specific gravity, serum osmolarity and serum sodium as indicated or ordered

## 2019-08-07 NOTE — CONSULTS
[facility]  Report of Consultation    Joievannessa Bonnie Patient Status:  Observation    10/30/1955 MRN [id number]   [facility] 4NW-A Attending Cornel Jimenez MD   Hosp Day # 0 PCP Doretha Garcia MD       Assessment / Plan:    1) CKD quit smoking. She has never used smokeless tobacco. She reports that she does not drink alcohol or use drugs.     Allergies:    Lisinopril              UNKNOWN, OTHER (SEE COMMENTS)    Comment:hyperkalemia    Medications:    Current Facility-Administered Me Oral, BID  •  sodium bicarbonate tab 650 mg, 650 mg, Oral, BID    Prior to Admission Medications:  insulin detemir 100 UNIT/ML Subcutaneous Solution Pen-injector Inject 5 Units into the skin nightly.        Review of Systems:  Please see HPI for pertinent p

## 2019-08-07 NOTE — PLAN OF CARE
Consumed 100% of lunch,ate a whole burger w/ cheese & broccoli florets 1/2 cup. No difficulty swallowing. tolerated reg diet w/ nectar thick liquids.

## 2019-08-08 LAB
ANION GAP SERPL CALC-SCNC: 8 MMOL/L (ref 0–18)
ANTIBODY SCREEN: POSITIVE
BUN BLD-MCNC: 80 MG/DL (ref 7–18)
BUN/CREAT SERPL: 18.5 (ref 10–20)
CALCIUM BLD-MCNC: 8.3 MG/DL (ref 8.5–10.1)
CHLORIDE SERPL-SCNC: 114 MMOL/L (ref 98–112)
CO2 SERPL-SCNC: 21 MMOL/L (ref 21–32)
CREAT BLD-MCNC: 4.32 MG/DL (ref 0.55–1.02)
DEPRECATED HBV CORE AB SER IA-ACNC: 34.1 NG/ML (ref 18–340)
DEPRECATED RDW RBC AUTO: 49.3 FL (ref 35.1–46.3)
ERYTHROCYTE [DISTWIDTH] IN BLOOD BY AUTOMATED COUNT: 14.5 % (ref 11–15)
GLUCOSE BLD-MCNC: 148 MG/DL (ref 70–99)
GLUCOSE BLD-MCNC: 159 MG/DL (ref 70–99)
GLUCOSE BLD-MCNC: 160 MG/DL (ref 70–99)
GLUCOSE BLD-MCNC: 171 MG/DL (ref 70–99)
GLUCOSE BLD-MCNC: 175 MG/DL (ref 70–99)
HCT VFR BLD AUTO: 21.7 % (ref 35–48)
HGB BLD-MCNC: 6.9 G/DL (ref 12–16)
IRON SATURATION: 15 % (ref 15–50)
IRON SERPL-MCNC: 27 UG/DL (ref 50–170)
MCH RBC QN AUTO: 29.5 PG (ref 26–34)
MCHC RBC AUTO-ENTMCNC: 31.8 G/DL (ref 31–37)
MCV RBC AUTO: 92.7 FL (ref 80–100)
OSMOLALITY SERPL CALC.SUM OF ELEC: 323 MOSM/KG (ref 275–295)
PLATELET # BLD AUTO: 264 10(3)UL (ref 150–450)
POTASSIUM SERPL-SCNC: 4.5 MMOL/L (ref 3.5–5.1)
RBC # BLD AUTO: 2.34 X10(6)UL (ref 3.8–5.3)
RGTSCRN: 2
RH BLOOD TYPE: POSITIVE
SODIUM SERPL-SCNC: 143 MMOL/L (ref 136–145)
TOTAL IRON BINDING CAPACITY: 185 UG/DL (ref 240–450)
TRANSFERRIN SERPL-MCNC: 124 MG/DL (ref 200–360)
WBC # BLD AUTO: 7.4 X10(3) UL (ref 4–11)

## 2019-08-08 PROCEDURE — 99225 SUBSEQUENT OBSERVATION CARE: CPT | Performed by: CLINICAL NURSE SPECIALIST

## 2019-08-08 PROCEDURE — 30233N1 TRANSFUSION OF NONAUTOLOGOUS RED BLOOD CELLS INTO PERIPHERAL VEIN, PERCUTANEOUS APPROACH: ICD-10-PCS | Performed by: INTERNAL MEDICINE

## 2019-08-08 PROCEDURE — 99213 OFFICE O/P EST LOW 20 MIN: CPT | Performed by: INTERNAL MEDICINE

## 2019-08-08 RX ORDER — SODIUM CHLORIDE 9 MG/ML
INJECTION, SOLUTION INTRAVENOUS ONCE
Status: DISCONTINUED | OUTPATIENT
Start: 2019-08-08 | End: 2019-08-09

## 2019-08-08 RX ORDER — SODIUM CHLORIDE 9 MG/ML
INJECTION, SOLUTION INTRAVENOUS ONCE
Status: COMPLETED | OUTPATIENT
Start: 2019-08-08 | End: 2019-08-09

## 2019-08-08 NOTE — PROGRESS NOTES
[facility]  Nephrology Progress Note    Joy Cutler Attending:  Ananya Coley MD       Assessment and Plan:    1) CKD 5- progressive renal failure is due to longstanding DM / HTN; previous eval for other etiologies unrevealing- no further w/ 08/08/2019    PGLU 148 08/08/2019       Imaging: All imaging studies reviewed.     Meds:     Current Facility-Administered Medications:  0.9% NaCl infusion  Intravenous Once   Fluticasone Furoate-Vilanterol (BREO ELLIPTA) 200-25 MCG/INH inhaler 1 puff 1 pu

## 2019-08-08 NOTE — PLAN OF CARE
Pt drowsy but easy to arouse. Pt  brought lotion from [facility]. It is not the prescription cream he thought he was getting. Pt  stayed for 45 minutes. Pt denies any pain. Unable to collect urine specimen on two accounts. Will attempt again.  Pt Fluid restriction as ordered  - Instruct patient on fluid and nutrition restrictions as appropriate  Outcome: Progressing  Goal: Hemodynamic stability and optimal renal function maintained  Description  INTERVENTIONS:  - Monitor labs and assess for signs a

## 2019-08-08 NOTE — DIETARY NOTE
Clinical Nutrition    Pt continues to consume 100% of meals. Per her primary RN from [place], pt eats all her meals and supplements. Able to feed self independently. Observed pt ate 100% of breakfast and 100% of nepro drink.  Currently pt is exceeding her nu

## 2019-08-08 NOTE — PLAN OF CARE
Pt drowsy, but arousable, oriented x2-3. Pt denies SOB and VSS. No c/o pain. Hgb=6.9 this Am. 1 unit of PRBCs ordered. Type and cross came back with antibodies, unit requested from [facility]. Consent obtained for blood transfusion from [address] [place] [address].  IV

## 2019-08-08 NOTE — PROGRESS NOTES
[facility]  Progress Note    Abeba Citizen Patient Status:  Observation    10/30/1955 MRN [id number]   [facility] 4NW-A Attending Anthony Gonzalez MD   Hosp Day # 0 PCP Javad Tsai MD         SUBJECTIVE:  Subjective:  Dale Raymundo hours.            Meds:     • sodium chloride   Intravenous Once   • iron sucrose  200 mg Intravenous Daily   • sodium chloride   Intravenous Once   • Fluticasone Furoate-Vilanterol  1 puff Inhalation Daily   • insulin detemir  5 Units Subcutaneous Nightly She is on sodium bicarbonate per Renal.    Approaching esrd but no hd yet per renal    8. Orthostasis, on Florinef. 9.       Depression, on Lexapro. 10.     Chronic muscle spasm, on baclofen. 11.     Weakness. PT/OT evaluate and treat. 12.      D

## 2019-08-08 NOTE — PROGRESS NOTES
[address] Follow Up    Nelson Bridger  [id number]  Patient seen at: [facility]    Subjective:      Wants to go back to [address]. Patient seen and evaluated, no family at bedside.      Review of Systems:   Palliat UNIT/ML injection 5,000 Units, 5,000 Units, Subcutaneous, 2 times per day  •  HYDROcodone-acetaminophen (NORCO) 5-325 MG per tab 1 tablet, 1 tablet, Oral, Q6H PRN  •  Labetalol HCl (NORMODYNE) tab 200 mg, 200 mg, Oral, BID  •  levETIRAcetam (KEPPRA) tab 1, Normocephalic, sclera anicteric, no injection. Oral mucous membranes moist   Lungs: Normal excursions and effort. Extremities: Peripheral pulses are 2+ mild BLE Edema present. Neurologic: Alert and oriented to person, place and time, normal affect.  L hand reason for and process to receive dialysis and stated that, \"I am going to do everything in my power to avoid dialysis\".  I noted that disease progression may not be all within her control and the reality that it is not IF she will sometime need dialysis acidosis     Toxic metabolic encephalopathy     Hyperglycemia     Sepsis due to urinary tract infection (HCC)     Hyperkalemia     Encephalopathy in sepsis     Sepsis (HCC)     Diabetes (HCC)     CKD (chronic kidney disease) stage 3, GFR 30-59 ml/min (formerly [facility]) regarding possible dialysis in the future. D/W SW.   2. CODE STATUS: FULL CODE verified  3.  POLST: Per previous POLST verified: wishes for FULL CODE/FULL TREATMENT BUT NO ARTIFICIAL FEEDING  4. HCPOA: Note HCPOA document in EMR from 11/26/18 and [facility]

## 2019-08-08 NOTE — SLP NOTE
ADULT SWALLOWING EVALUATION    ASSESSMENT    ASSESSMENT/OVERALL IMPRESSION:  Pt seen this AM for bedside swallow evaluation. Pt admitted to hospital due to abnormal labs.  Pt diagnosed with failure to thrive given decreased PO intake and noncompliance with care, counseling/discussion      Past Medical History  Past Medical History:   Diagnosis Date   • Altered mental status     A&OX2 PER NORMAL    • Candidiasis    • Cervical disc disease with myelopathy    • Chronic pain    • CKD (chronic kidney disease) required to rule-out silent aspiration.)    Esophageal Phase of Swallow: No complaints consistent with possible esophageal involvement      FOLLOW UP  Treatment Plan/Recommendations: No further inpatient SLP service warranted     Follow Up Needed: No

## 2019-08-09 VITALS
TEMPERATURE: 98 F | RESPIRATION RATE: 18 BRPM | DIASTOLIC BLOOD PRESSURE: 44 MMHG | HEART RATE: 66 BPM | SYSTOLIC BLOOD PRESSURE: 112 MMHG | OXYGEN SATURATION: 95 %

## 2019-08-09 LAB
ALBUMIN SERPL-MCNC: 1.8 G/DL (ref 3.4–5)
ALBUMIN/GLOB SERPL: 0.5 {RATIO} (ref 1–2)
ALP LIVER SERPL-CCNC: 59 U/L (ref 50–130)
ALT SERPL-CCNC: 7 U/L (ref 13–56)
ANION GAP SERPL CALC-SCNC: 9 MMOL/L (ref 0–18)
AST SERPL-CCNC: 6 U/L (ref 15–37)
BASOPHILS # BLD AUTO: 0.03 X10(3) UL (ref 0–0.2)
BASOPHILS NFR BLD AUTO: 0.4 %
BILIRUB SERPL-MCNC: 0.2 MG/DL (ref 0.1–2)
BLOOD TYPE BARCODE: 9500
BUN BLD-MCNC: 91 MG/DL (ref 7–18)
BUN/CREAT SERPL: 20.2 (ref 10–20)
CALCIUM BLD-MCNC: 8.1 MG/DL (ref 8.5–10.1)
CHLORIDE SERPL-SCNC: 110 MMOL/L (ref 98–112)
CO2 SERPL-SCNC: 22 MMOL/L (ref 21–32)
CREAT BLD-MCNC: 4.5 MG/DL (ref 0.55–1.02)
DEPRECATED RDW RBC AUTO: 51.6 FL (ref 35.1–46.3)
EOSINOPHIL # BLD AUTO: 0.98 X10(3) UL (ref 0–0.7)
EOSINOPHIL NFR BLD AUTO: 11.6 %
ERYTHROCYTE [DISTWIDTH] IN BLOOD BY AUTOMATED COUNT: 15 % (ref 11–15)
GLOBULIN PLAS-MCNC: 3.9 G/DL (ref 2.8–4.4)
GLUCOSE BLD-MCNC: 180 MG/DL (ref 70–99)
GLUCOSE BLD-MCNC: 84 MG/DL (ref 70–99)
GLUCOSE BLD-MCNC: 87 MG/DL (ref 70–99)
HCT VFR BLD AUTO: 23.6 % (ref 35–48)
HGB BLD-MCNC: 7.3 G/DL (ref 12–16)
HGB BLD-MCNC: 7.4 G/DL (ref 12–16)
IMM GRANULOCYTES # BLD AUTO: 0.19 X10(3) UL (ref 0–1)
IMM GRANULOCYTES NFR BLD: 2.2 %
LYMPHOCYTES # BLD AUTO: 1.87 X10(3) UL (ref 1–4)
LYMPHOCYTES NFR BLD AUTO: 22.1 %
M PROTEIN MFR SERPL ELPH: 5.7 G/DL (ref 6.4–8.2)
MCH RBC QN AUTO: 29 PG (ref 26–34)
MCHC RBC AUTO-ENTMCNC: 30.9 G/DL (ref 31–37)
MCV RBC AUTO: 93.7 FL (ref 80–100)
MONOCYTES # BLD AUTO: 0.85 X10(3) UL (ref 0.1–1)
MONOCYTES NFR BLD AUTO: 10 %
NEUTROPHILS # BLD AUTO: 4.56 X10 (3) UL (ref 1.5–7.7)
NEUTROPHILS # BLD AUTO: 4.56 X10(3) UL (ref 1.5–7.7)
NEUTROPHILS NFR BLD AUTO: 53.7 %
OSMOLALITY SERPL CALC.SUM OF ELEC: 319 MOSM/KG (ref 275–295)
PLATELET # BLD AUTO: 257 10(3)UL (ref 150–450)
POTASSIUM SERPL-SCNC: 4.9 MMOL/L (ref 3.5–5.1)
RBC # BLD AUTO: 2.52 X10(6)UL (ref 3.8–5.3)
SODIUM SERPL-SCNC: 141 MMOL/L (ref 136–145)
WBC # BLD AUTO: 8.5 X10(3) UL (ref 4–11)

## 2019-08-09 PROCEDURE — 99213 OFFICE O/P EST LOW 20 MIN: CPT | Performed by: INTERNAL MEDICINE

## 2019-08-09 NOTE — PROGRESS NOTES
[facility]  Nephrology Progress Note    Nelson Sparks Attending:  Kasandra Hughes MD       Assessment and Plan:    1) CKD 5- progressive renal failure is due to longstanding DM / HTN; previous eval for other etiologies unrevealing- no further w/ 08/09/2019    BILT 0.2 08/09/2019    TP 5.7 08/09/2019    AST 6 08/09/2019    ALT 7 08/09/2019    PGLU 84 08/09/2019       Imaging: All imaging studies reviewed.     Meds:     Current Facility-Administered Medications:  iron sucrose (VENOFER) IV Push 200 m bedside.           Pérez Gil  8/9/2019  842 AM

## 2019-08-09 NOTE — CM/SW NOTE
MSW notified Sylvia Rene of return today at 2pm via [employer]. PCS form placed on chart. MSW left a message for shruti Lagunas to inform of dc.     Robin  P:[phone]  T:[phone]    Kylah Mcbride LCSW

## 2019-08-09 NOTE — PLAN OF CARE
A&Ox2, baseline. Pleasantly confused/forgetful. No complaints of pain this shift. Tolerating diet- regular consistency with nectar thick liquids. No s/s aspiration. Hgb stable at 7.3. No s/s bleeding. Generalized dry flaky skin.  Hx seizures- precautions

## 2019-08-09 NOTE — DISCHARGE SUMMARY
[facility]  Discharge Summary    Sky Monahan Patient Status:  Observation    10/30/1955 MRN [id number]   [facility] 4NW-A Attending Bri Edwards MD   Hosp Day # 0 PCP Charbel Pelaez MD     Date of Admission: 2019    Date care encounter     Counseling regarding advance care planning and goals of care     Acute renal failure (ARF) ([facility] [address]     Acute kidney injury ([facility]     Chronic renal failure, stage 4 (severe) (HCC)     Weakness generalized     Anemia, unspecified type     Pa 08/06/19  1454   ALT 7*   AST <3*   ALB 1.9*                 Recent Labs   Lab 08/06/19  2042 08/07/19  0742 08/07/19  1751 08/07/19  2116 08/08/19  0738   PGLU 79 116* 124* 175* 148*         No results for input(s): URINE, CULTI, BLDSMR in the last 168 ho Breo inhaler.     4.       Insulin-dependent diabetes mellitus with complication.  Continue Levemir and sliding scale.     5.       Seizure disorder.  Continue Keppra.     6.       History of psychosis, on Risperdal per psychiatrist.     7.       Chronic k (two) times daily. To groin, under breasts, jerald-area, and abd. folds    levetiracetam 1000 MG Oral Tab  Take 1,000 mg by mouth daily. Fludrocortisone Acetate 0.1 MG Oral Tab  Take 1 tablet (0.1 mg total) by mouth daily.   Qty: 30 tablet Refills: 0    He

## 2019-08-09 NOTE — PLAN OF CARE
NURSING DISCHARGE NOTE    Discharged Nursing home- [facility] via Ambulance. Accompanied by Support staff  Belongings Taken by patient/family. AVS and IP Transfer Report provided to EMS to give to [facility] staff.   Reviewed discharge plan of care with emperatriz

## 2019-08-09 NOTE — PROGRESS NOTES
[facility]  Progress Note    Jayde Galeana Patient Status:  Observation    10/30/1955 MRN [id number]   [facility] 4NW-A Attending Kellee Pierre MD   Hosp Day # 0 PCP Cari Schilling MD         SUBJECTIVE:  Subjective:  Anais Troy URINE, CULTI, BLDSMR in the last 168 hours.             Meds:     • iron sucrose  200 mg Intravenous Daily   • sodium chloride   Intravenous Once   • Fluticasone Furoate-Vilanterol  1 puff Inhalation Daily   • insulin detemir  5 Units Subcutaneous Nightly is on sodium bicarbonate per Renal.    Approaching esrd but no hd yet per renal    8. Orthostasis, on Florinef. 9.       Depression, on Lexapro. 10.     Chronic muscle spasm, on baclofen. 11.     Weakness. PT/OT evaluate and treat. 12.      DVT p

## 2019-08-09 NOTE — PLAN OF CARE
Patient alert and oriented X2, follows simple commands. Blood transfusion completed, tolerated well. No complications noted. Vital signs stable. Afebrile. Maintained on Aspiration and Seizure precautions. On nectar thickened liquid, tolerating so far.  Fall nutrition restrictions as appropriate  Outcome: Progressing     Problem: HEMATOLOGIC - ADULT  Goal: Maintains hematologic stability  Description  INTERVENTIONS  - Assess for signs and symptoms of bleeding or hemorrhage  - Monitor labs and vital signs for t

## 2019-09-09 ENCOUNTER — HOSPITAL ENCOUNTER (EMERGENCY)
Facility: HOSPITAL | Age: 64
Discharge: HOME | End: 2019-09-09
Attending: EMERGENCY MEDICINE
Payer: MEDICARE

## 2019-09-09 VITALS
WEIGHT: 184 LBS | DIASTOLIC BLOOD PRESSURE: 48 MMHG | HEART RATE: 67 BPM | BODY MASS INDEX: 31.41 KG/M2 | OXYGEN SATURATION: 99 % | RESPIRATION RATE: 12 BRPM | HEIGHT: 64 IN | SYSTOLIC BLOOD PRESSURE: 141 MMHG | TEMPERATURE: 99 F

## 2019-09-09 DIAGNOSIS — N18.9 CHRONIC RENAL FAILURE, UNSPECIFIED CKD STAGE: Primary | ICD-10-CM

## 2019-09-09 LAB
ALBUMIN SERPL-MCNC: 2.2 G/DL (ref 3.4–5)
ALBUMIN/GLOB SERPL: 0.6 {RATIO} (ref 1–2)
ALP LIVER SERPL-CCNC: 88 U/L (ref 50–130)
ALT SERPL-CCNC: 10 U/L (ref 13–56)
ANION GAP SERPL CALC-SCNC: 9 MMOL/L (ref 0–18)
AST SERPL-CCNC: 4 U/L (ref 15–37)
BASOPHILS # BLD AUTO: 0.02 X10(3) UL (ref 0–0.2)
BASOPHILS NFR BLD AUTO: 0.3 %
BILIRUB SERPL-MCNC: 0.1 MG/DL (ref 0.1–2)
BUN BLD-MCNC: 81 MG/DL (ref 7–18)
BUN/CREAT SERPL: 18.9 (ref 10–20)
CALCIUM BLD-MCNC: 8.4 MG/DL (ref 8.5–10.1)
CHLORIDE SERPL-SCNC: 110 MMOL/L (ref 98–112)
CO2 SERPL-SCNC: 22 MMOL/L (ref 21–32)
CREAT BLD-MCNC: 4.28 MG/DL (ref 0.55–1.02)
DEPRECATED RDW RBC AUTO: 51.5 FL (ref 35.1–46.3)
EOSINOPHIL # BLD AUTO: 0.41 X10(3) UL (ref 0–0.7)
EOSINOPHIL NFR BLD AUTO: 6.5 %
ERYTHROCYTE [DISTWIDTH] IN BLOOD BY AUTOMATED COUNT: 14.8 % (ref 11–15)
GLOBULIN PLAS-MCNC: 3.9 G/DL (ref 2.8–4.4)
GLUCOSE BLD-MCNC: 217 MG/DL (ref 70–99)
HCT VFR BLD AUTO: 26.2 % (ref 35–48)
HGB BLD-MCNC: 8.2 G/DL (ref 12–16)
IMM GRANULOCYTES # BLD AUTO: 0.07 X10(3) UL (ref 0–1)
IMM GRANULOCYTES NFR BLD: 1.1 %
LYMPHOCYTES # BLD AUTO: 1.19 X10(3) UL (ref 1–4)
LYMPHOCYTES NFR BLD AUTO: 18.9 %
M PROTEIN MFR SERPL ELPH: 6.1 G/DL (ref 6.4–8.2)
MCH RBC QN AUTO: 29.5 PG (ref 26–34)
MCHC RBC AUTO-ENTMCNC: 31.3 G/DL (ref 31–37)
MCV RBC AUTO: 94.2 FL (ref 80–100)
MONOCYTES # BLD AUTO: 0.4 X10(3) UL (ref 0.1–1)
MONOCYTES NFR BLD AUTO: 6.3 %
NEUTROPHILS # BLD AUTO: 4.21 X10 (3) UL (ref 1.5–7.7)
NEUTROPHILS # BLD AUTO: 4.21 X10(3) UL (ref 1.5–7.7)
NEUTROPHILS NFR BLD AUTO: 66.9 %
OSMOLALITY SERPL CALC.SUM OF ELEC: 323 MOSM/KG (ref 275–295)
PLATELET # BLD AUTO: 292 10(3)UL (ref 150–450)
POTASSIUM SERPL-SCNC: 5.2 MMOL/L (ref 3.5–5.1)
RBC # BLD AUTO: 2.78 X10(6)UL (ref 3.8–5.3)
SODIUM SERPL-SCNC: 141 MMOL/L (ref 136–145)
WBC # BLD AUTO: 6.3 X10(3) UL (ref 4–11)

## 2019-09-09 PROCEDURE — 93005 ELECTROCARDIOGRAM TRACING: CPT

## 2019-09-09 PROCEDURE — 99285 EMERGENCY DEPT VISIT HI MDM: CPT

## 2019-09-09 PROCEDURE — 36415 COLL VENOUS BLD VENIPUNCTURE: CPT

## 2019-09-09 PROCEDURE — 99284 EMERGENCY DEPT VISIT MOD MDM: CPT

## 2019-09-09 PROCEDURE — 80053 COMPREHEN METABOLIC PANEL: CPT | Performed by: EMERGENCY MEDICINE

## 2019-09-09 PROCEDURE — 85025 COMPLETE CBC W/AUTO DIFF WBC: CPT | Performed by: EMERGENCY MEDICINE

## 2019-09-09 PROCEDURE — 93010 ELECTROCARDIOGRAM REPORT: CPT

## 2019-09-10 NOTE — ED PROVIDER NOTES
Patient Seen in: [facility] Emergency Department    History   Patient presents with:  Abnormal Result (metabolic, cardiac)    Stated Complaint: ELEVATED CREATINE    HPI    71-year-old female presents to ED for evaluation of elevated creatinine.   Pa reviewed and negative except as noted above.     Physical Exam     ED Triage Vitals [09/09/19 2129]   /55   Pulse 74   Resp 14   Temp 98.5 °F (36.9 °C)   Temp src Temporal   SpO2 100 %   O2 Device        Current:/55   Pulse 74   Temp 98.5 °F (36 DIFFERENTIAL[[id number]]          Abnormal            Final result                 Please view results for these tests on the individual orders. RAINBOW DRAW BLUE   RAINBOW DRAW LAVENDER   RAINBOW DRAW LIGHT GREEN   RAINBOW DRAW GOLD   Potassium 5.2.   [address]  [phone]      As needed        Medications Prescribed:  Current Discharge Medication List

## 2019-09-10 NOTE — ED NOTES
Report to Shirly Rinne RN at [facility] that patient will be discharged

## 2019-09-11 LAB
ATRIAL RATE: 69 BPM
P AXIS: 43 DEGREES
P-R INTERVAL: 180 MS
Q-T INTERVAL: 386 MS
QRS DURATION: 68 MS
QTC CALCULATION (BEZET): 413 MS
R AXIS: 20 DEGREES
T AXIS: 46 DEGREES
VENTRICULAR RATE: 69 BPM

## 2019-10-24 ENCOUNTER — APPOINTMENT (OUTPATIENT)
Dept: CT IMAGING | Facility: HOSPITAL | Age: 64
DRG: 871 | End: 2019-10-24
Attending: STUDENT IN AN ORGANIZED HEALTH CARE EDUCATION/TRAINING PROGRAM
Payer: MEDICARE

## 2019-10-24 ENCOUNTER — HOSPITAL ENCOUNTER (INPATIENT)
Facility: HOSPITAL | Age: 64
LOS: 2 days | Discharge: NURSING, REHAB OR LONG-TERM CARE | DRG: 871 | End: 2019-10-30
Attending: STUDENT IN AN ORGANIZED HEALTH CARE EDUCATION/TRAINING PROGRAM | Admitting: INTERNAL MEDICINE
Payer: MEDICARE

## 2019-10-24 ENCOUNTER — APPOINTMENT (OUTPATIENT)
Dept: GENERAL RADIOLOGY | Facility: HOSPITAL | Age: 64
DRG: 871 | End: 2019-10-24
Attending: STUDENT IN AN ORGANIZED HEALTH CARE EDUCATION/TRAINING PROGRAM
Payer: MEDICARE

## 2019-10-24 DIAGNOSIS — B37.9 CANDIDIASIS: ICD-10-CM

## 2019-10-24 DIAGNOSIS — N39.0 URINARY TRACT INFECTION WITHOUT HEMATURIA, SITE UNSPECIFIED: Primary | ICD-10-CM

## 2019-10-24 DIAGNOSIS — R41.82 ALTERED MENTAL STATUS, UNSPECIFIED ALTERED MENTAL STATUS TYPE: ICD-10-CM

## 2019-10-24 PROCEDURE — 71045 X-RAY EXAM CHEST 1 VIEW: CPT | Performed by: STUDENT IN AN ORGANIZED HEALTH CARE EDUCATION/TRAINING PROGRAM

## 2019-10-24 PROCEDURE — 70450 CT HEAD/BRAIN W/O DYE: CPT | Performed by: STUDENT IN AN ORGANIZED HEALTH CARE EDUCATION/TRAINING PROGRAM

## 2019-10-24 RX ORDER — SODIUM CHLORIDE 9 MG/ML
INJECTION, SOLUTION INTRAVENOUS CONTINUOUS
Status: DISCONTINUED | OUTPATIENT
Start: 2019-10-24 | End: 2019-10-27

## 2019-10-25 PROBLEM — N18.5 CKD (CHRONIC KIDNEY DISEASE) STAGE 5, GFR LESS THAN 15 ML/MIN (HCC): Status: ACTIVE | Noted: 2019-06-15

## 2019-10-25 PROBLEM — B37.9 CANDIDIASIS: Status: ACTIVE | Noted: 2019-10-25

## 2019-10-25 PROCEDURE — 99223 1ST HOSP IP/OBS HIGH 75: CPT | Performed by: INTERNAL MEDICINE

## 2019-10-25 RX ORDER — DIPHENHYDRAMINE HYDROCHLORIDE 50 MG/ML
25 INJECTION INTRAMUSCULAR; INTRAVENOUS EVERY 6 HOURS PRN
Status: DISCONTINUED | OUTPATIENT
Start: 2019-10-25 | End: 2019-10-30

## 2019-10-25 RX ORDER — HYDROCODONE BITARTRATE AND ACETAMINOPHEN 5; 325 MG/1; MG/1
1 TABLET ORAL EVERY 6 HOURS PRN
Status: DISCONTINUED | OUTPATIENT
Start: 2019-10-25 | End: 2019-10-30

## 2019-10-25 RX ORDER — FOLIC ACID 1 MG/1
1 TABLET ORAL DAILY
Status: DISCONTINUED | OUTPATIENT
Start: 2019-10-25 | End: 2019-10-30

## 2019-10-25 RX ORDER — DEXTROSE MONOHYDRATE 25 G/50ML
INJECTION, SOLUTION INTRAVENOUS
Status: COMPLETED
Start: 2019-10-25 | End: 2019-10-25

## 2019-10-25 RX ORDER — BACLOFEN 20 MG/1
20 TABLET ORAL 3 TIMES DAILY
Status: DISCONTINUED | OUTPATIENT
Start: 2019-10-25 | End: 2019-10-30

## 2019-10-25 RX ORDER — ESCITALOPRAM OXALATE 10 MG/1
10 TABLET ORAL DAILY
Status: DISCONTINUED | OUTPATIENT
Start: 2019-10-25 | End: 2019-10-30

## 2019-10-25 RX ORDER — HEPARIN SODIUM 5000 [USP'U]/ML
5000 INJECTION, SOLUTION INTRAVENOUS; SUBCUTANEOUS EVERY 12 HOURS SCHEDULED
Status: DISCONTINUED | OUTPATIENT
Start: 2019-10-25 | End: 2019-10-30

## 2019-10-25 RX ORDER — MELATONIN
325 2 TIMES DAILY WITH MEALS
Status: DISCONTINUED | OUTPATIENT
Start: 2019-10-25 | End: 2019-10-30

## 2019-10-25 RX ORDER — RISPERIDONE 0.5 MG/1
0.5 TABLET, FILM COATED ORAL 2 TIMES DAILY
Status: DISCONTINUED | OUTPATIENT
Start: 2019-10-25 | End: 2019-10-30

## 2019-10-25 RX ORDER — AMLODIPINE BESYLATE 5 MG/1
5 TABLET ORAL DAILY
Status: DISCONTINUED | OUTPATIENT
Start: 2019-10-25 | End: 2019-10-30

## 2019-10-25 RX ORDER — DOCUSATE SODIUM 100 MG/1
100 CAPSULE, LIQUID FILLED ORAL 2 TIMES DAILY
Status: DISCONTINUED | OUTPATIENT
Start: 2019-10-25 | End: 2019-10-30

## 2019-10-25 RX ORDER — DOXEPIN HYDROCHLORIDE 50 MG/1
1 CAPSULE ORAL DAILY
Status: DISCONTINUED | OUTPATIENT
Start: 2019-10-25 | End: 2019-10-30

## 2019-10-25 RX ORDER — HYDRALAZINE HYDROCHLORIDE 20 MG/ML
10 INJECTION INTRAMUSCULAR; INTRAVENOUS EVERY 6 HOURS PRN
Status: DISCONTINUED | OUTPATIENT
Start: 2019-10-25 | End: 2019-10-30

## 2019-10-25 RX ORDER — DIPHENHYDRAMINE HCL 25 MG
25 CAPSULE ORAL EVERY 6 HOURS PRN
Status: DISCONTINUED | OUTPATIENT
Start: 2019-10-25 | End: 2019-10-30

## 2019-10-25 RX ORDER — SODIUM CHLORIDE 9 MG/ML
INJECTION, SOLUTION INTRAVENOUS CONTINUOUS
Status: DISCONTINUED | OUTPATIENT
Start: 2019-10-25 | End: 2019-10-25

## 2019-10-25 RX ORDER — DEXTROSE MONOHYDRATE 25 G/50ML
50 INJECTION, SOLUTION INTRAVENOUS AS NEEDED
Status: DISCONTINUED | OUTPATIENT
Start: 2019-10-25 | End: 2019-10-30

## 2019-10-25 RX ORDER — SODIUM BICARBONATE 325 MG/1
650 TABLET ORAL 2 TIMES DAILY
Status: DISCONTINUED | OUTPATIENT
Start: 2019-10-25 | End: 2019-10-28

## 2019-10-25 RX ORDER — ACETAMINOPHEN 325 MG/1
650 TABLET ORAL EVERY 6 HOURS PRN
Status: DISCONTINUED | OUTPATIENT
Start: 2019-10-25 | End: 2019-10-30

## 2019-10-25 RX ORDER — FLUDROCORTISONE ACETATE 0.1 MG/1
0.1 TABLET ORAL DAILY
Status: DISCONTINUED | OUTPATIENT
Start: 2019-10-25 | End: 2019-10-30

## 2019-10-25 RX ORDER — LEVETIRACETAM 500 MG/1
1000 TABLET ORAL DAILY
Status: DISCONTINUED | OUTPATIENT
Start: 2019-10-25 | End: 2019-10-30

## 2019-10-25 RX ORDER — LABETALOL 200 MG/1
200 TABLET, FILM COATED ORAL 2 TIMES DAILY
Status: DISCONTINUED | OUTPATIENT
Start: 2019-10-25 | End: 2019-10-30

## 2019-10-25 RX ORDER — CALCIUM ACETATE 667 MG/1
1 CAPSULE ORAL 2 TIMES DAILY WITH MEALS
Status: DISCONTINUED | OUTPATIENT
Start: 2019-10-25 | End: 2019-10-30

## 2019-10-25 RX ORDER — SODIUM CHLORIDE 450 MG/100ML
INJECTION, SOLUTION INTRAVENOUS CONTINUOUS
Status: DISCONTINUED | OUTPATIENT
Start: 2019-10-25 | End: 2019-10-27

## 2019-10-25 NOTE — PROGRESS NOTES
Pt is a 67 y/o female admitted with AMS due to UTI. on CTX. Arch Roni Pt Aox1-2, more awake but confused. talking and wants to go out and smoke. Room air, no complaints of pain. aspiration,seizure and fall precaution Pt.  Pt goes in and out of yelling and sleeping and

## 2019-10-25 NOTE — CM/SW NOTE
Patient admitted from [facility]. BRAYDON left a voice mail for guardian Ayadotoniel Yost, informing him of pt's hospital admission and making sure the DC plan is for the patient to go back to [address].     Updates sent to Robin on 10.25.19.    BRAYDON will continue to f

## 2019-10-25 NOTE — ED INITIAL ASSESSMENT (HPI)
Pt to ED via EMS for AMS. Pt normally a&o x3 per nursing home. Pt today is more listless, falling asleep frequently and a&ox1.

## 2019-10-25 NOTE — CONSULTS
[facility]  Report of Consultation    Sky Monahan Patient Status:  Observation    10/30/1955 MRN [id number]   [facility] 5NW-A Attending Bri Edwards MD   Hosp Day # 0 PCP Charbel Pelaez MD       Assessment / Plan:    1) CKD [facility])    • Depression    • Diabetes ([facility]    • Encephalopathy    • Essential hypertension    • Hyperkalemia    • Seborrheic dermatitis of scalp    • Seizure disorder ([facility]     unspecified convusions    • Unspecified behavioral syndromes associated with phy Glycopyrrolate CAPS 1 capsule, 1 capsule, Inhalation, BID  •  folic acid (FOLVITE) tab 1 mg, 1 mg, Oral, Daily  •  Fluticasone Furoate-Vilanterol (BREO ELLIPTA) 100-25 MCG/INH inhaler 1 puff, 1 puff, Inhalation, Daily  •  diphenhydrAMINE (BENADRYL) cap/tab non-tender. + bowel sounds, no palpable organomegaly  Extremities: Without clubbing, cyanosis; no edema  Neurologic: Cranial nerves grossly intact, moving all extremities  Skin: Warm and dry, no rashes      Laboratory Data:  Lab Results   Component Value D

## 2019-10-25 NOTE — PROGRESS NOTES
10/25/19 0047   Provider Notification   Reason for Communication New consult   Provider Name Other (comment)  ([facility])   Method of Communication Page   Response Waiting for response  (Will hear back in the morning. )   Notification Time [phone]       Charbel

## 2019-10-25 NOTE — RESPIRATORY THERAPY NOTE
Breo inhaler administered. Patient currently uses Lonhala inhaler at home, not on formulary in this facility. Asked if family could bring her inhaler here, she said no. Suggest substituting Incruse during hospital stay. Discussed with RN, Chari Barton.  He will

## 2019-10-25 NOTE — OCCUPATIONAL THERAPY NOTE
OT orders received, chart reviewed. Per notes, patient is dependent for all ADLs and functional mobility w / use of total lift and 24 hr assist from staff at [facility] [address]. No skilled OT needs at this time. Will sign off.

## 2019-10-25 NOTE — H&P
[facility] Patient Status:  Observation    10/30/1955 MRN [id number]   [facility] 5NW-A Attending Batsheva Elena MD   Hosp Day # 0 PCP Daphne Allen MD     Date:  10/25/2019  Date of Admis No      Frequency: Never    Drug use: No    Allergies/Medications: Allergies:   Lisinopril              UNKNOWN, OTHER (SEE COMMENTS)    Comment:hyperkalemia  insulin glargine 100 UNIT/ML Subcutaneous Solution, Inject into the skin nightly.   Fluticasone- needed. triamcinolone acetonide 0.1 % External Cream, Apply topically daily. Ketoconazole 2 % External Shampoo, Apply topically every third day.  To scalp for seborrheic dermatitis  nystatin 617572 UNIT/GM External Cream, Apply 1 Application topically 2 ( 10/25/2019    .0 10/25/2019    CREATSERUM 4.88 (H) 10/25/2019    BUN 78 (H) 10/25/2019     (H) 10/25/2019    K 5.3 (H) 10/25/2019     (H) 10/25/2019    CO2 21.0 10/25/2019    GLU 67 (L) 10/25/2019    CA 8.7 10/25/2019    ALB 2.3 (L) 10/2 PATIENT STATED HISTORY: (As transcribed by Technologist)  Patient presents with increasing altered mental status and lethargy per patient's family. Patient has a history of chronic kidney disease.     FINDINGS:  Some of the images are degraded by patient m lower cervical spine with plate and screw devices. CONCLUSION:  No acute intrathoracic process is identified.     Dictated by: Ramsey Cho MD on 10/24/2019 at 21:56     Approved by: Ramsey Cho MD on 10/24/2019 at 21:57           No resul ([facility] [address]     Acute kidney injury ([facility] [address]     Chronic renal failure, stage 4 (severe) (HCC)     Weakness generalized     Anemia, unspecified type     Palliative care by specialist     Goals of care, counseling/discussion     Candidiasis        Urinary tract infe

## 2019-10-25 NOTE — PHYSICAL THERAPY NOTE
PT orders rec'd, chart reviewed. Per notes, pt is a resident at [address] [place] [address] at [place] and dependent for all ADL's, dependent for mobility with use of total lift for transfers. Will dc from our services as pt is not appropriate for skilled IPPT at this time.

## 2019-10-25 NOTE — SLP NOTE
ADULT SWALLOWING EVALUATION    ASSESSMENT    ASSESSMENT/OVERALL IMPRESSION:  Patient seen for swallowing evaluation per protocol. Patient with history of dysphagia evidenced by admission with altered diet consistency.  She is known to this department from MEDICAL HISTORY  Reason for Referral: Altered diet consistency    Problem List  Principal Problem:    Urinary tract infection without hematuria, site unspecified  Active Problems:    Altered mental status, unspecified altered mental status type    Candid sips;Consecutive swallows  Patient Positioning: Upright;Midline(in bed)    Oral Phase of Swallow: Within Functional Limits                      Pharyngeal Phase of Swallow:  Within Functional Limits           (Please note: Silent aspiration cannot be evalua

## 2019-10-25 NOTE — CONSULTS
INFECTIOUS DISEASE CONSULT NOTE    Nelson Sparks Patient Status:  Observation    10/30/1955 MRN [id number]   [facility] 5NW-A Attending Kasandra Hughes MD   Hosp Day # 0 PCP Leatha Enterprise reports that she has been smoking cigarettes. She has been smoking about 1.00 pack per day. She has never used smokeless tobacco. She reports that she does not drink alcohol or use drugs.     Allergies:    Lisinopril              UNKNOWN, OTHER (SEE COMMEN •  Insulin Aspart Pen (NOVOLOG) 100 UNIT/ML flexpen 1-5 Units, 1-5 Units, Subcutaneous, TID PC  •  influenza vaccine split quad (FLULAVAL) ages 6 months to 64 years inj 0.5ml, 0.5 mL, Intramuscular, Prior to discharge  •  dextrose 50 % injection 50 mL, 50 ALKPHO 83 79   AST <3* <3*   ALT 9* 9*   BILT 0.2 0.2   TP 6.6 6.4     Recent Labs   Lab 10/24/19  2112   COLORUR Yellow   CLARITY Hazy*   SPECGRAVITY 1.014   GLUUR Negative   BILUR Negative   KETUR Negative   BLOODURINE Negative   PHURINE 7.0   PROUR >=50 CONCLUSION:  1. Mild chronic microvascular ischemic changes. 2. Chronic sinusitis. 3. If clinical symptoms persist, consider follow-up MRI further evaluation.     Dictated by: Meagan Siddiqui MD on 10/24/2019 at 23:36     Approved by: Meagan Siddiqui,

## 2019-10-25 NOTE — PROGRESS NOTES
NURSING ADMISSION NOTE      Patient admitted via Cart  Oriented to room. Safety precautions initiated. Bed in low position. Call light in reach. Pt Aox1-2, very lethargic. Room air, no complaints of pain.  Admission navigator and assessment comple

## 2019-10-25 NOTE — ED PROVIDER NOTES
Patient Seen in: [facility] Emergency Department      History   Patient presents with:  Altered Mental Status (neurologic)    Stated Complaint: AMS    HPI    53-year-old female brought in by family for altered mental status.   Patient is from the [place] noted above.     Physical Exam     ED Triage Vitals [10/24/19 2030]   BP (!) 166/68   Pulse 79   Resp 12   Temp 98.8 °F (37.1 °C)   Temp src Temporal   SpO2 99 %   O2 Device None (Room air)       Current:BP (!) 166/68   Pulse 79   Temp 98.8 °F (37.1 °C) (Te Narrative: The following orders were created for panel order CBC WITH DIFFERENTIAL WITH PLATELET.   Procedure                               Abnormality         Status                     ---------                               -----------         ------ 1 g IV ordered. Mild leukocytosis. Discussed with primary care doctor and we will admit for further evaluation.   Admission disposition: 10/24/2019  9:40 PM                   Disposition and Plan     Clinical Impression:  Urinary tract infection without h

## 2019-10-26 PROCEDURE — 99232 SBSQ HOSP IP/OBS MODERATE 35: CPT | Performed by: INTERNAL MEDICINE

## 2019-10-26 NOTE — PLAN OF CARE
Pt a&ox2, confused. At times restless, picking at skin. Hx of seizures, seizure precautions in place. VSS. On ra maintaining O2 sats. Pt will not keep pulse ox on. Strict I&Os. Tolerating diet with nectar thickened liquids.  No complaints of n/v. QID accuch liquid  - Offer pills one at a time, crush or deliver with applesauce as needed  - Discontinue feeding and notify MD (or speech pathologist) if coughing or persistent throat clearing or wet/gurgly vocal quality is noted  Outcome: Progressing     Problem: R status, cognitive ability or social support system  Outcome: Progressing

## 2019-10-26 NOTE — PROGRESS NOTES
[facility]                INFECTIOUS DISEASE PROGRESS NOTE    Haritha Braden Patient Status:  Observation    10/30/1955 MRN [id number]   [facility] 5NW-A Attending Sergey Dennis MD   Hosp Day # 0 PCP Helen Vasquez MD     A Anemia     Leukocytosis     Azotemia     Metabolic acidosis     Toxic metabolic encephalopathy     Hyperglycemia     Sepsis due to urinary tract infection (HCC)     Hyperkalemia     Encephalopathy in sepsis     Sepsis ([facility] [address]     Diabetes ([facility] [address]     CKD ( pending  Adjust abx per micro results    Norma Frances MD  [facility]  [phone]

## 2019-10-26 NOTE — PROGRESS NOTES
[facility]  Progress Note    Simone Apo Patient Status:  Observation    10/30/1955 MRN [id number]   [facility] 5NW-A Attending Angela Barcenas MD   Hosp Day # 0 PCP Abisai Kothari MD         SUBJECTIVE:  Subjective:  Osmany Prakash 10/25/19  1630 10/25/19  2104 10/26/19  0703   PGLU 106* 92 141* 116* 71       No results for input(s): URINE, CULTI, BLDSMR in the last 168 hours. Hospital Encounter on 10/24/19   1.  BLOOD CULTURE     Status: None (Preliminary result)    Nathan Mukherjee OTHER:             None. CONCLUSION:  1. Mild chronic microvascular ischemic changes. 2. Chronic sinusitis. 3. If clinical symptoms persist, consider follow-up MRI further evaluation.     Dictated by: Jose Baeza MD on 10/24/2019 at 23:36     Ap Aspart Pen  1-5 Units Subcutaneous TID PC   • epoetin keeley-epbx  10,000 Units Subcutaneous Weekly   • cefTRIAXone  1 g Intravenous Q24H   • Umeclidinium Bromide  1 puff Inhalation Daily     • sodium chloride 100 mL/hr at 10/26/19 0802   • sodium chloride [facility])     Palliative care encounter     Counseling regarding advance care planning and goals of care     Acute renal failure (ARF) ([facility] [address]     Acute kidney injury ([facility] [address]     Chronic renal failure, stage 4 (severe) (HCC)     Weakness generalized     Anemia, un

## 2019-10-26 NOTE — PLAN OF CARE
Received patient A/O x1, VSS with no complaints of pain. Maintaining O2 saturation on room air. Spot checking with pulseox as patient will not keep monitor on. Aspiration precautions, Nectar thick liquids. Rash to patients entire body.  Monitoring glucose a risk of falls.   - [place] fall precautions as indicated by assessment.  - Educate pt/family on patient safety including physical limitations  - Instruct pt to call for assistance with activity based on assessment  - Modify environment to reduce risk of i

## 2019-10-26 NOTE — PROGRESS NOTES
[facility]  Nephrology Progress Note    Stacy Fish Patient Status:  Observation    10/30/1955 MRN [id number]   [facility] 5NW-A Attending Taya Lau MD   Hosp Day # 0 PCP Je De Jesus MD       SUBJECTIVE:  Stable this a 141* 116* 71 177*       Meds:   acetaminophen (TYLENOL) tab 650 mg, 650 mg, Oral, Q6H PRN  docusate sodium (COLACE) cap 100 mg, 100 mg, Oral, BID  Labetalol HCl (NORMODYNE) tab 200 mg, 200 mg, Oral, BID  calcium acetate (PHOSLO) cap 667 mg, 1 capsule, Oral Intravenous, Q24H  Umeclidinium Bromide (INCRUSE ELLIPTA) 62.5 MCG/INH inhaler 1 puff, 1 puff, Inhalation, Daily  0.9% NaCl infusion, , Intravenous, Continuous          Impression/Plan:    #1.  AHSAN/CKD IV- has had longstanding and progressive CKD due to DM/

## 2019-10-27 PROCEDURE — 99232 SBSQ HOSP IP/OBS MODERATE 35: CPT | Performed by: INTERNAL MEDICINE

## 2019-10-27 RX ORDER — CEPHALEXIN 500 MG/1
500 CAPSULE ORAL DAILY
Qty: 7 CAPSULE | Refills: 0 | Status: SHIPPED | OUTPATIENT
Start: 2019-10-27 | End: 2019-11-03

## 2019-10-27 NOTE — PROGRESS NOTES
Pt was potential dc today, however noticed hgb trending downward. Nephrology aware, order for repeat CBC in am and to keep pt overnight.

## 2019-10-27 NOTE — PLAN OF CARE
Received patient A/O x4, VSS with no complaints of pain. Maintaining O 2 saturation on room air.  spot checking when ripping off pulse ox. Continue IV fluids and antibiotics. Patient tolerating diet order set up. Nectar thick liquids.  Monitoring intact ordered  - Implement neutropenic guidelines  Outcome: Progressing     Problem: SAFETY ADULT - FALL  Goal: Free from fall injury  Description  INTERVENTIONS:  - Assess pt frequently for physical needs  - Identify cognitive and physical deficits and behavior

## 2019-10-27 NOTE — PROGRESS NOTES
[facility]                INFECTIOUS DISEASE PROGRESS NOTE    aHritha Braden Patient Status:  Observation    10/30/1955 MRN [id number]   [facility] 5NW-A Attending Sergey Dennis MD   Hosp Day # 0 PCP Helen Vasquez MD     A Sensitive      Cefazolin <=4 Sensitive      Ciprofloxacin >=4 Resistant      Gentamicin <=1 Sensitive      Meropenem <=0.25 Sensitive      Levofloxacin >=8 Resistant      Nitrofurantoin 128 Resistant      Piperacillin + Tazobactam <=4 Sensitive      Trimet failure (ARF) ([address]     Acute kidney injury ([address]     Chronic renal failure, stage 4 (severe) (HCC)     Weakness generalized     Anemia, unspecified type     Palliative care by specialist     Goals of care, counseling/discussion     Candidiasis      ASSESS

## 2019-10-27 NOTE — PROGRESS NOTES
[facility]  Nephrology Progress Note    Martha Blood Patient Status:  Observation    10/30/1955 MRN [id number]   [facility] 5NW-A Attending Adam Amador MD   Hosp Day # 0 PCP Anali Galvez MD       SUBJECTIVE:  Looks much be 2. 4* 2.3*       Recent Labs   Lab 10/26/19  0703 10/26/19  1136 10/26/19  1646 10/26/19  2146 10/27/19  0740   PGLU 71 177* 179* 210* 147*       Meds:   acetaminophen (TYLENOL) tab 650 mg, 650 mg, Oral, Q6H PRN  docusate sodium (COLACE) cap 100 mg, 100 mg, 10,000 Units, Subcutaneous, Weekly  CefTRIAXone Sodium (ROCEPHIN) 1 g in sodium chloride 0.9% 100 mL MBP/ADD-vantage, 1 g, Intravenous, Q24H  Umeclidinium Bromide (INCRUSE ELLIPTA) 62.5 MCG/INH inhaler 1 puff, 1 puff, Inhalation, Daily  0.9% NaCl infusion,

## 2019-10-27 NOTE — DISCHARGE SUMMARY
[facility]  Discharge Summary    Mohini Garrett Patient Status:  Observation    10/30/1955 MRN [id number]   [facility] 5NW-A Attending Bobby Adler MD   Hosp Day # 0 PCP Jermaine Hall MD     Date of Admission: 10/24/2019    Da without hematuria     CKD (chronic kidney disease) stage 5, GFR less than 15 ml/min [facility])     Palliative care encounter     Counseling regarding advance care planning and goals of care     Acute renal failure (ARF) ([facility] [address]     Acute kidney injury ([facility] [address]     C symmetric all extremities   Skin:  Per wound care team   Lymph nodes:     Neurologic:  Left hemiparesis         History of Present Illness: As above    Hospital Course: As above    Consultations: ID, renal    Procedures: None    Complications: None    Disp Tab  Take 5 mg by mouth daily. baclofen 20 MG Oral Tab  Take 1 tablet (20 mg total) by mouth 3 (three) times daily. Qty: 90 tablet Refills: 0    calcium acetate 667 MG Oral Cap  Take 1 capsule by mouth 2 (two) times daily.     Multiple Vitamin (TAB-A-

## 2019-10-27 NOTE — PLAN OF CARE
Problem: Diabetes/Glucose Control  Goal: Glucose maintained within prescribed range  Description  INTERVENTIONS:  - Monitor Blood Glucose as ordered  - Assess for signs and symptoms of hyperglycemia and hypoglycemia  - Administer ordered medications to m factors as ordered  - Implement neutropenic guidelines  Outcome: Progressing     Problem: SAFETY ADULT - FALL  Goal: Free from fall injury  Description  INTERVENTIONS:  - Assess pt frequently for physical needs  - Identify cognitive and physical deficits a

## 2019-10-28 ENCOUNTER — APPOINTMENT (OUTPATIENT)
Dept: GENERAL RADIOLOGY | Facility: HOSPITAL | Age: 64
DRG: 871 | End: 2019-10-28
Attending: INTERNAL MEDICINE
Payer: MEDICARE

## 2019-10-28 PROCEDURE — 74230 X-RAY XM SWLNG FUNCJ C+: CPT | Performed by: INTERNAL MEDICINE

## 2019-10-28 PROCEDURE — 99232 SBSQ HOSP IP/OBS MODERATE 35: CPT | Performed by: INTERNAL MEDICINE

## 2019-10-28 RX ORDER — SODIUM BICARBONATE 325 MG/1
650 TABLET ORAL 3 TIMES DAILY
Status: DISCONTINUED | OUTPATIENT
Start: 2019-10-28 | End: 2019-10-30

## 2019-10-28 RX ORDER — NICOTINE 21 MG/24HR
1 PATCH, TRANSDERMAL 24 HOURS TRANSDERMAL DAILY
Status: DISCONTINUED | OUTPATIENT
Start: 2019-10-28 | End: 2019-10-30

## 2019-10-28 NOTE — CM/SW NOTE
Updates sent to [facility], anticipate d/c tomorrow. Informed that patient is current under [place] [id number] services, will resume at d/c.     Eldon Carter RN,   Phone [phone]  Pager [phone]

## 2019-10-28 NOTE — PROGRESS NOTES
INFECTIOUS DISEASE PROGRESS NOTE    Nelly Iglesias Patient Status:  Observation    10/30/1955 MRN [id number]   [facility] 5NW-A Attending Erin Bean MD   Hosp Day # 0 PCP [facility] UNIT/ML flexpen 1-5 Units, 1-5 Units, Subcutaneous, TID PC  •  influenza vaccine split quad (FLULAVAL) ages 6 months to 64 years inj 0.5ml, 0.5 mL, Intramuscular, Prior to discharge  •  dextrose 50 % injection 50 mL, 50 mL, Intravenous, PRN  •  epoetin FDC CA 9.0 8.7 8.3* 8.0*   ALB 2.4* 2.3*  --   --    * 148* 143 142   K 5.7* 5.3* 5.0 5.2*   * 121* 116* 114*   CO2 22.0 21.0 18.0* 17.0*   ALKPHO 83 79  --   --    AST <3* <3*  --   --    ALT 9* 9*  --   --    BILT 0.2 0.2  --   --    TP 6.6 6.4

## 2019-10-28 NOTE — SLP NOTE
SPEECH DAILY NOTE - INPATIENT    ASSESSMENT & PLAN   ASSESSMENT  Pt seen for dysphagia tx to assess tolerance with recommended diet, ensure proper utilization of aspiration precautions and provide pt/family education. Patient alert and up in bed.   She con Follow-up Date: 10/28/19  Number of Visits to Meet Established Goals: 1    Session: 1 of previously planned 2    If you have any questions, please contact Isrrael Head Nick [phone] [facility] [place]  Pager [phone]

## 2019-10-28 NOTE — PLAN OF CARE
Problem: Diabetes/Glucose Control  Goal: Glucose maintained within prescribed range  Description  INTERVENTIONS:  - Monitor Blood Glucose as ordered  - Assess for signs and symptoms of hyperglycemia and hypoglycemia  - Administer ordered medications to m Absence of fever/infection during anticipated neutropenic period  Description  INTERVENTIONS  - Monitor WBC  - Administer growth factors as ordered  - Implement neutropenic guidelines  Outcome: Progressing     Problem: SAFETY ADULT - FALL  Goal: Free from

## 2019-10-28 NOTE — PROGRESS NOTES
[facility]  Progress Note    Joy Cutler Patient Status:  Observation    10/30/1955 MRN [id number]   [facility] 5NW-A Attending Sol Velazco MD   Hosp Day # 0 PCP Lennox Ahr, MD         SUBJECTIVE:  Subjective:  Val Hinojosa 66*   CREATSERUM 5.01* 4.88* 4.28* 4.03*   CA 9.0 8.7 8.3* 8.0*   MG  --   --  1.8  --    PHOS  --   --  4.2  --    * 67* 65* 214*       Recent Labs   Lab 10/24/19  2049 10/25/19  0640   ALT 9* 9*   AST <3* <3*   ALB 2.4* 2.3*       Recent Labs   La in size. INTRACRANIAL:  There are no abnormal extraaxial fluid collections. There is no midline shift. Mild decreased attenuation in the periventricular and subcortical deep white matter consistent with chronic microvascular ischemic change.   There is n sodium  100 mg Oral BID   • Labetalol HCl  200 mg Oral BID   • calcium acetate  1 capsule Oral BID with meals   • multivitamin  1 tablet Oral Daily   • amLODIPine Besylate  5 mg Oral Daily   • baclofen  20 mg Oral TID   • risperiDONE  0.5 mg Oral BID   • H hematuria, site unspecified     Altered mental status, unspecified altered mental status type     Pyelonephritis     Nosocomial pneumonia     Leukocytosis, unspecified type     Stage 3 chronic kidney disease (HCC)     CKD (chronic kidney disease), stage IV reviewed  PT and/or OT  Abisai Kothari MD

## 2019-10-28 NOTE — PLAN OF CARE
Problem: Diabetes/Glucose Control  Goal: Glucose maintained within prescribed range  Description  INTERVENTIONS:  - Monitor Blood Glucose as ordered  - Assess for signs and symptoms of hyperglycemia and hypoglycemia  - Administer ordered medications to m period  Description  INTERVENTIONS  - Monitor WBC  - Administer growth factors as ordered  - Implement neutropenic guidelines  Outcome: Progressing     Problem: SAFETY ADULT - FALL  Goal: Free from fall injury  Description  INTERVENTIONS:  - Assess pt freq diet, nectar thick fluids, order set-up, pills whole in applesauce. Accuchecks QID. Aspiration precautions maintained. IV abx, SL. Monitoring hemoglobin, possible d/c 10/28 if results ok. Bed exit alarm on and engaged.   Call light within reach, no com

## 2019-10-28 NOTE — PALLIATIVE CARE NOTE
Received request from Dr. Mariana Crowley for Palliative Care Consultation. Pt is known to our palliative service, Briana WHITTAKER and I have followed Asha Myers on prior admissions. Asha Myers has a court appointed guardian, Corrinne Laity [phone].      Chart re

## 2019-10-28 NOTE — PROGRESS NOTES
[facility]  Nephrology Progress Note    Mort Plate Patient Status:  Observation    10/30/1955 MRN [id number]   [facility] [place] 5NW-A Attending Samra Carias MD   Hosp Day # 0 PCP Dalton López MD       SUBJECTIVE:  No acute even PRN  epoetin keeley-epbx (RETACRIT) 62216 UNIT/ML injection 10,000 Units, 10,000 Units, Subcutaneous, Weekly  CefTRIAXone Sodium (ROCEPHIN) 1 g in sodium chloride 0.9% 100 mL MBP/ADD-vantage, 1 g, Intravenous, Q24H  Umeclidinium Bromide (INCRUSE ELLIPTA) 62. insensible losses. Improved; monitor    3. 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  1 amp of sodium bicarb today + increase oral sodium bicarb to tid    4. HTN- BP stable-  cont usual amlodipine and labetalol    5. UTI- cx with 50-100k proteus.   On ceftriaxon

## 2019-10-28 NOTE — PROGRESS NOTES
Pt is a 67 y/o female admitted with AMS due to UTI. on CTX. Pt Aox1-2, more awake but confused. talking and wants to go out and smoke. Room air, no complaints of pain. aspiration,seizure and fall precaution/ Pt goes in and out of yelling and sleeping and jason

## 2019-10-28 NOTE — VIDEO SWALLOW STUDY NOTE
ADULT VIDEOFLUOROSCOPIC SWALLOWING STUDY    Admission Date: 10/24/2019  Evaluation Date: 10/28/19  Radiologist: Dr. Yusuf Rule: Regular  Diet Recommendations - Liquid:  Thin    Further Follow-up:  Follow Up N intrathoracic process is identified.            Dictated by: Reyes Stone MD on 10/24/2019 at 21:56       Approved by: Reyes Stone MD on 10/24/2019 at 21:57       Reason for Referral: Altered diet consistency  Assess for possible upgrade in liq which spilled to the pharynx after the primary swallow. There was only one episode of transient laryngeal penetration during the swallow with thin liquids but no tracheal aspiration.       The patient was able to follow commands well for slow rate and smal

## 2019-10-29 ENCOUNTER — APPOINTMENT (OUTPATIENT)
Dept: CT IMAGING | Facility: HOSPITAL | Age: 64
DRG: 871 | End: 2019-10-29
Attending: INTERNAL MEDICINE
Payer: MEDICARE

## 2019-10-29 PROCEDURE — 99232 SBSQ HOSP IP/OBS MODERATE 35: CPT | Performed by: INTERNAL MEDICINE

## 2019-10-29 PROCEDURE — 99223 1ST HOSP IP/OBS HIGH 75: CPT | Performed by: OTHER

## 2019-10-29 PROCEDURE — 95816 EEG AWAKE AND DROWSY: CPT | Performed by: OTHER

## 2019-10-29 PROCEDURE — 70450 CT HEAD/BRAIN W/O DYE: CPT | Performed by: INTERNAL MEDICINE

## 2019-10-29 RX ORDER — SODIUM CHLORIDE 450 MG/100ML
INJECTION, SOLUTION INTRAVENOUS CONTINUOUS
Status: DISCONTINUED | OUTPATIENT
Start: 2019-10-29 | End: 2019-10-30

## 2019-10-29 NOTE — PALLIATIVE CARE NOTE
Palliative Care Update - in response to consult request received yesterday from Dr. Ariane Ortiz for [address] Chart reviewed noting episodes of hypotension requiring IVF bolus last night with improved Bps.      New this AM - new onset mental status changes/lethargy

## 2019-10-29 NOTE — SLP NOTE
SPEECH DAILY NOTE - INPATIENT    ASSESSMENT & PLAN   ASSESSMENT  Pt seen for dysphagia tx to assess tolerance with recommended diet, ensure proper utilization of aspiration precautions and provide pt/family education.   Patient alert and up in bed, but a bi

## 2019-10-29 NOTE — CONSULTS
[id number] [facility] Neurology Consultation    Date of consult: 10/29/2019    Reason for consult: altered mental status    HPI: Mohini Garrett is a 66year old female with limited baseline was admitted for UTI since 24th.  Last night patient appeared Furoate-Vilanterol (BREO ELLIPTA) 100-25 MCG/INH inhaler 1 puff, 1 puff, Inhalation, Daily  diphenhydrAMINE (BENADRYL) cap/tab 25 mg, 25 mg, Oral, Q6H PRN  escitalopram (LEXAPRO) tablet 10 mg, 10 mg, Oral, Daily  insulin detemir (LEVEMIR) 100 UNIT/ML flext PERIRECTAL ABSCESS       Social History:  Social History    Tobacco Use      Smoking status: Current Every Day Smoker        Packs/day: 1.00        Types: Cigarettes      Smokeless tobacco: Never Used    Alcohol use: No      Frequency: Never    Family Hist --   --           Assessment:  Toxic metabolic encephalopathy  Delirium  H/o Seizure disorder  MR baseline  UTI    Principal Problem:    Urinary tract infection without hematuria, site unspecified  Active Problems:    Essential hypertension    Altered me

## 2019-10-29 NOTE — PROGRESS NOTES
to floor this noc at 2015 stating that he feels patient is more confused and not talking like she was yesterday. Patient assessment completed. Patient at first was staring off in space and refusing to answer questions.   When asked what year it w

## 2019-10-29 NOTE — DIETARY NOTE
[address]     Admitting diagnosis:  Candidiasis [B37.9]  Urinary tract infection without hematuria, site unspecified [N39.0]  Altered mental status, unspecified altered mental status type [R41.82]    Ht:  5

## 2019-10-29 NOTE — PROCEDURES
Date of Procedure: 10/29/2019    Procedure: EEG (ELECTROENCEPHALOGRAM)     DX: CANDIDIASIS  HX: A 62 YO FEMALE WHO PRESENTS WITH AMS. PT WAS NOTED TO BE SLEEPING ALL DAY AND NOT ORIENTED TO HER BASELINE PRIOR TO COMING TO HOSPITAL.    PMH:AMS, CANDIDIASIS,

## 2019-10-29 NOTE — PLAN OF CARE
Received patient at [phone]. Patient is drowsy, easily aroused. MD notified by prior nurse that patient appeared to be having 'word salad' when asked some question. Tolerating thin liquids. Maintaining O2 saturation on room air. .  Attempting to remove puls home  Interventions:   10/28-supportive care,possible discharge  10/25-supportive care  - Cluster care  - skin care  - Evaluations  - consults  -lotion  - patient education   - See additional Care Plan goals for specific interventions            Outcome: P

## 2019-10-29 NOTE — PROGRESS NOTES
[facility]  Progress Note    Mohini Garrett Patient Status:  Observation    10/30/1955 MRN [id number]   [facility] 5NW-A Attending Bobby Adler MD   Hosp Day # 1 PCP Jermaine Hall MD         SUBJECTIVE:  Subjective:  Asha Myers Lab 10/24/19  2049 10/25/19  0640   ALT 9* 9*   AST <3* <3*   ALB 2.4* 2.3*       Recent Labs   Lab 10/28/19  0709 10/28/19  1119 10/28/19  1555 10/28/19  2052 10/29/19  0726   PGLU 216* 223* 133* 172* 118*       No results for input(s): URINE, CULTI, BLDS PROCEDURE:  CT BRAIN OR HEAD (93829)  COMPARISON:  [facility] , CT, CT BRAIN OR HEAD (07185), 6/15/2019, 16:27. INDICATIONS:  AMS  TECHNIQUE:  Noncontrast CT scanning is performed through the brain. Dose reduction techniques were used.  Dose information is tra PROCEDURE:  XR CHEST AP PORTABLE  (CPT=71045)  TECHNIQUE:  AP chest radiograph was obtained. COMPARISON:  [facility] , XR, XR CHEST AP PORTABLE  (CPT=71045), 8/06/2019, 17:16.   INDICATIONS:  AMS  PATIENT STATED HISTORY: (As transcribed by Technologist)  Anna acetaminophen, HYDROcodone-acetaminophen, diphenhydrAMINE HCl, diphenhydrAMINE HCl, hydrALAzine HCl, influenza virus vaccine PF, dextrose       Assessment/Plan:   Patient Active Problem List:     Anemia     Leukocytosis     Azotemia     Metabolic acidosis Palliative care by specialist     Goals of care, counseling/discussion     Candidiasis    Principal Problem:    Urinary tract infection without hematuria, site unspecified  Active Problems:    Essential hypertension    Altered mental status, unspecified Renal f/u per renal-no further w/u.  Approaching ESRD but is not a good dialysis candidate       Candidiasis  Id f/u     HTN- labetolol/ amlodipine     metab acidosis- due to crf     Seizure dx- keppra     dvt px- sq heparin     dm2 with complications- ssi

## 2019-10-29 NOTE — PLAN OF CARE
Problem: UTI, AMS  Data: Pt is alert and oriented x2, delay at baseline. This morning pt noted to have aphasia, unable to say her name or , which is not her baseline, appeared to have difficulty finding words.  Pt also noted to be removing gown and blank home  10/28-supportive care,possible discharge  10/25-supportive care  10/29 am: comfort, CT brain, neuro consult    Interventions:   - Cluster care  - skin care  - Evaluations  - consults  -lotion  - patient education   - See additional Care Plan goals fo planning  - Arrange for needed discharge resources and transportation as appropriate  - Identify discharge learning needs (meds, wound care, etc)  - Arrange for interpreters to assist at discharge as needed  - Consider post-discharge preferences of patient

## 2019-10-29 NOTE — PROGRESS NOTES
Multidisciplinary Discharge Rounds held 10/29/2019. Treatment team members present today include , , Charge Nurse, Nurse, RT, PT and Pharmacy caring for Nelly Iglesias.      Other care providers present:    Mobility Goal:

## 2019-10-29 NOTE — PROGRESS NOTES
[facility]  Nephrology Progress Note    [facility] Patient Status:  Observation    10/30/1955 MRN [id number]   [facility] 5NW-A Attending Luca Patel MD   Hosp Day # 1 PCP Jackie Grigsby MD       SUBJECTIVE:  No acute even Prior to discharge  dextrose 50 % injection 50 mL, 50 mL, Intravenous, PRN  epoetin keeley-epbx (RETACRIT) 85602 UNIT/ML injection 10,000 Units, 10,000 Units, Subcutaneous, Weekly  CefTRIAXone Sodium (ROCEPHIN) 1 g in sodium chloride 0.9% 100 mL MBP/ADD-vant fluids    2. Hypernatremia- due to poor free water intake with insensible losses. Improved; monitor    3. 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 Continue sodium bicarbonate supplementation    4. HTN- BP stable-  cont usual amlodipine and labetalol    5.   UTI-

## 2019-10-30 VITALS
RESPIRATION RATE: 16 BRPM | SYSTOLIC BLOOD PRESSURE: 153 MMHG | TEMPERATURE: 97 F | WEIGHT: 191.81 LBS | DIASTOLIC BLOOD PRESSURE: 63 MMHG | HEART RATE: 61 BPM | BODY MASS INDEX: 33 KG/M2 | OXYGEN SATURATION: 99 %

## 2019-10-30 PROCEDURE — 99232 SBSQ HOSP IP/OBS MODERATE 35: CPT | Performed by: INTERNAL MEDICINE

## 2019-10-30 PROCEDURE — 99233 SBSQ HOSP IP/OBS HIGH 50: CPT | Performed by: OTHER

## 2019-10-30 PROCEDURE — 99221 1ST HOSP IP/OBS SF/LOW 40: CPT | Performed by: NURSE PRACTITIONER

## 2019-10-30 NOTE — CM/SW NOTE
SW called [facility] to alert them that pt was being dc'd but I was told that she is not active with them. BRAYDON sent message to [place] via [address] requesting clarification on PC at [place]. NEGRITO CrenshawW  /Dischage Planner  [phone]

## 2019-10-30 NOTE — PLAN OF CARE
Problem: Diabetes/Glucose Control  Goal: Glucose maintained within prescribed range  Description  INTERVENTIONS:  - Monitor Blood Glucose as ordered  - Assess for signs and symptoms of hyperglycemia and hypoglycemia  - Administer ordered medications to m (or speech pathologist) if coughing or persistent throat clearing or wet/gurgly vocal quality is noted   Outcome: Adequate for Discharge     Problem: RISK FOR INFECTION - ADULT  Goal: Absence of fever/infection during anticipated neutropenic period  Susanari Problem: Impaired Swallowing  Goal: Minimize aspiration risk  Description  Interventions:  - Patient should be alert and upright for all feedings (90 degrees preferred)  - Offer food and liquids at a slow rate  - No straws  - Encourage small bites of martha

## 2019-10-30 NOTE — PROGRESS NOTES
[address]  Neurology Progress Note    Saba Vincent Patient Status:  Inpatient    10/30/1955 MRN [id number]   [facility] 5NW-A Attending Yaw Shea MD   Hosp Day # 2 PCP Ace Engel MD     CC:  LIVIA    Subjective:  Ricardo Andres EEG  Abnormal EEG due to presence of generalized slowing and triphasic waves superimposed large amplitude sharp slow waves, toxic metabolic encephalopathy is more likely, but seizure disorder is on the differential.     10/29/2019 CT Brain  No acute intrac

## 2019-10-30 NOTE — CM/SW NOTE
10/30/19 1300   Discharge disposition   Expected discharge disposition Skilled Nurs   Name of [facility]   Discharge transportation [address] [facility]       Pt to return to [facility] today at [place].  RN to call report to  [phone]

## 2019-10-30 NOTE — PLAN OF CARE
Problem: Diabetes/Glucose Control  Goal: Glucose maintained within prescribed range  Description  INTERVENTIONS:  - Monitor Blood Glucose as ordered  - Assess for signs and symptoms of hyperglycemia and hypoglycemia  - Administer ordered medications to m quality is noted   Outcome: Progressing     Problem: SAFETY ADULT - FALL  Goal: Free from fall injury  Description  INTERVENTIONS:  - Assess pt frequently for physical needs  - Identify cognitive and physical deficits and behaviors that affect risk of fall

## 2019-10-30 NOTE — CONSULTS
[facility] Patient Status:  Inpatient    10/30/1955 MRN [id number]   [facility] 5NW-A Attending Rosita Alfaro MD   Hosp Day # 2 PCP Gerald Black MD     Date of Consult: 10/30/ disease) ([facility]    • Depression    • Diabetes ([facility]    • Encephalopathy    • Essential hypertension    • Hyperkalemia    • Seborrheic dermatitis of scalp    • Seizure disorder (HCC)     unspecified convusions    • Unspecified behavioral syndromes associated (LIORESAL) tab 20 mg, 20 mg, Oral, TID  •  risperiDONE (RISPERDAL) tab 0.5 mg, 0.5 mg, Oral, BID  •  Heparin Sodium (Porcine) 5000 UNIT/ML injection 5,000 Units, 5,000 Units, Subcutaneous, 2 times per day  •  Fludrocortisone Acetate (FLORINEF) tab 0.1 mg, picking at IV tubing. Needs multiple prompts to answer questions. Unable to state her name without multiple prompts, unable to state her  including month, date, or year when asked questions separately, and today is her birthday.       Wants to smoke ciga 's name is Gladys FreemanJavier RHOADES. PSYCHIATRIC:  Restless. SKIN: Warm and dry. Pale.     Palliative Performance Scale: 30-40%    Palliative Performance Scale   % Ambulation Activity Level Self-Care Intake Consciousness   100 Full Normal Full   Normal Full   90 that I would document that her wishes are consistent. I also reminded her that her kidneys did improve, and that she has been followed by renal service, whose input this admission is that HD would not be in her best interests.   Val Hinojosa kept shaking her Chava Peres per pt statement today. Patient's decision making preferences: unable to participate meaningfully in discussion during this admission, but do recommend including her in future discussions as able.  Consult guardian for decisions, consents, etc.    Marc Alcantara cystitis without hematuria     CKD (chronic kidney disease) stage 5, GFR less than 15 ml/min [facility])     Palliative care encounter     Counseling regarding advance care planning and goals of care     Acute renal failure (ARF) ([facility] [address]     Acute kidney injury ([facility] Krish Mendez Thank you for inviting Palliative Care Service to participate in the care of Ashwin Church. Palliative Care Service will sign off as [facility] unchanged, DC planning underway.       FABI Panda, [facility]  Palliative Care  [phone]

## 2019-10-30 NOTE — PROGRESS NOTES
NURSING DISCHARGE NOTE    Discharged Nursing home via Ambulance. Accompanied by Support staff  Belongings Taken by patient/family. Pt discharged back to [address] Reported called to [address]. Discharge paperwork sent with ambulance. PIV removed.  Dash Bedolla

## 2019-10-30 NOTE — PROGRESS NOTES
[facility]  Progress Note    Abeba Citizen Patient Status:  Observation    10/30/1955 MRN [id number]   [facility] 5NW-A Attending Anthony Gonzalez MD   Hosp Day # 2 PCP Javad Tsai MD         SUBJECTIVE:  Subjective:  Dale Raymundo AST <3* <3* 4*   ALB 2.4* 2.3* 2.3*       Recent Labs   Lab 10/29/19  0726 10/29/19  1159 10/29/19  1648 10/29/19  2108 10/30/19  0704   PGLU 118* 198* 320* 167* 93       No results for input(s): URINE, CULTI, BLDSMR in the last 168 hours.     [facility] PROCEDURE:  CT BRAIN OR HEAD (43802)  COMPARISON:  [facility] , CT, CT BRAIN OR HEAD (50479), 6/15/2019, 16:27. INDICATIONS:  AMS  TECHNIQUE:  Noncontrast CT scanning is performed through the brain. Dose reduction techniques were used.  Dose information is tra PROCEDURE:  XR CHEST AP PORTABLE  (CPT=71045)  TECHNIQUE:  AP chest radiograph was obtained. COMPARISON:  [facility] , XR, XR CHEST AP PORTABLE  (CPT=71045), 8/06/2019, 17:16.   INDICATIONS:  AMS  PATIENT STATED HISTORY: (As transcribed by Technologist)  Anna acetaminophen, HYDROcodone-acetaminophen, diphenhydrAMINE HCl, diphenhydrAMINE HCl, hydrALAzine HCl, influenza virus vaccine PF, dextrose       Assessment/Plan:   Patient Active Problem List:     Anemia     Leukocytosis     Azotemia     Metabolic acidosis Palliative care by specialist     Goals of care, counseling/discussion     Candidiasis    Principal Problem:    Urinary tract infection without hematuria, site unspecified  Active Problems:    Essential hypertension    Altered mental status, unspecified Renal f/u per renal-no further w/u.  Approaching ESRD but is not a good dialysis candidate       Candidiasis  Id f/u     HTN- labetolol/ amlodipine     metab acidosis- due to crf     Seizure dx- keppra     dvt px- sq heparin     dm2 with complications- ssi

## 2019-10-30 NOTE — PROGRESS NOTES
Multidisciplinary Discharge Rounds held 10/30/2019. Treatment team members present today include , , Charge Nurse, Nurse, RT, PT and Pharmacy caring for [facility].      Other care providers present:    Mobility Goal:

## 2019-10-30 NOTE — PROGRESS NOTES
[facility]  Nephrology Progress Note    Berhane Monsalve Attending:  Ute Ojeda MD       Assessment and Plan:    1) CKD 5- progressive renal failure is due to longstanding DM / HTN; previous eval for other etiologies unrevealing- no further w/ 25.7 10/30/2019    .0 10/30/2019    CREATSERUM 4.02 10/30/2019    BUN 63 10/30/2019     10/30/2019    K 5.1 10/30/2019     10/30/2019    CO2 19.0 10/30/2019    GLU 96 10/30/2019    CA 8.4 10/30/2019    ALB 2.3 10/30/2019    ALKPHO 76 10/ 10 mg, Intravenous, Q6H PRN  Insulin Aspart Pen (NOVOLOG) 100 UNIT/ML flexpen 1-5 Units, 1-5 Units, Subcutaneous, TID PC  influenza vaccine split quad (FLULAVAL) ages 6 months to 64 years inj 0.5ml, 0.5 mL, Intramuscular, Prior to discharge  dextrose 50 %

## 2019-12-03 ENCOUNTER — APPOINTMENT (OUTPATIENT)
Dept: ULTRASOUND IMAGING | Facility: HOSPITAL | Age: 64
DRG: 683 | End: 2019-12-03
Attending: EMERGENCY MEDICINE
Payer: MEDICARE

## 2019-12-03 ENCOUNTER — APPOINTMENT (OUTPATIENT)
Dept: GENERAL RADIOLOGY | Facility: HOSPITAL | Age: 64
DRG: 683 | End: 2019-12-03
Attending: EMERGENCY MEDICINE
Payer: MEDICARE

## 2019-12-03 ENCOUNTER — HOSPITAL ENCOUNTER (INPATIENT)
Facility: HOSPITAL | Age: 64
LOS: 7 days | Discharge: HOSPICE | DRG: 683 | End: 2019-12-10
Attending: EMERGENCY MEDICINE | Admitting: SPECIALIST
Payer: MEDICARE

## 2019-12-03 DIAGNOSIS — N30.00 ACUTE CYSTITIS WITHOUT HEMATURIA: Primary | ICD-10-CM

## 2019-12-03 DIAGNOSIS — N18.9 ACUTE RENAL FAILURE SUPERIMPOSED ON CHRONIC KIDNEY DISEASE, UNSPECIFIED CKD STAGE, UNSPECIFIED ACUTE RENAL FAILURE TYPE (HCC): ICD-10-CM

## 2019-12-03 DIAGNOSIS — E87.0 HYPERNATREMIA: ICD-10-CM

## 2019-12-03 DIAGNOSIS — I50.9 ACUTE ON CHRONIC CONGESTIVE HEART FAILURE, UNSPECIFIED HEART FAILURE TYPE (HCC): ICD-10-CM

## 2019-12-03 DIAGNOSIS — N17.9 ACUTE RENAL FAILURE SUPERIMPOSED ON CHRONIC KIDNEY DISEASE, UNSPECIFIED CKD STAGE, UNSPECIFIED ACUTE RENAL FAILURE TYPE (HCC): ICD-10-CM

## 2019-12-03 LAB
ALBUMIN SERPL-MCNC: 2.2 G/DL (ref 3.4–5)
ALBUMIN/GLOB SERPL: 0.5 {RATIO} (ref 1–2)
ALP LIVER SERPL-CCNC: 65 U/L (ref 50–130)
ALT SERPL-CCNC: 12 U/L (ref 13–56)
ANION GAP SERPL CALC-SCNC: 8 MMOL/L (ref 0–18)
AST SERPL-CCNC: 8 U/L (ref 15–37)
ATRIAL RATE: 68 BPM
BASOPHILS # BLD: 0 X10(3) UL (ref 0–0.2)
BASOPHILS NFR BLD: 0 %
BILIRUB SERPL-MCNC: 0.2 MG/DL (ref 0.1–2)
BILIRUB UR QL STRIP.AUTO: NEGATIVE
BUN BLD-MCNC: 75 MG/DL (ref 7–18)
BUN/CREAT SERPL: 14.1 (ref 10–20)
CALCIUM BLD-MCNC: 8.2 MG/DL (ref 8.5–10.1)
CHLORIDE SERPL-SCNC: 125 MMOL/L (ref 98–112)
CLARITY UR REFRACT.AUTO: CLEAR
CO2 SERPL-SCNC: 15 MMOL/L (ref 21–32)
COLOR UR AUTO: YELLOW
CREAT BLD-MCNC: 5.31 MG/DL (ref 0.55–1.02)
DEPRECATED RDW RBC AUTO: 52.8 FL (ref 35.1–46.3)
EOSINOPHIL # BLD: 0.3 X10(3) UL (ref 0–0.7)
EOSINOPHIL NFR BLD: 3 %
ERYTHROCYTE [DISTWIDTH] IN BLOOD BY AUTOMATED COUNT: 15.3 % (ref 11–15)
GLOBULIN PLAS-MCNC: 4.1 G/DL (ref 2.8–4.4)
GLUCOSE BLD-MCNC: 204 MG/DL (ref 70–99)
GLUCOSE UR STRIP.AUTO-MCNC: 50 MG/DL
HCT VFR BLD AUTO: 24.6 % (ref 35–48)
HGB BLD-MCNC: 7.7 G/DL (ref 12–16)
KETONES UR STRIP.AUTO-MCNC: NEGATIVE MG/DL
LACTATE SERPL-SCNC: 1 MMOL/L (ref 0.4–2)
LYMPHOCYTES NFR BLD: 1.29 X10(3) UL (ref 1–4)
LYMPHOCYTES NFR BLD: 13 %
M PROTEIN MFR SERPL ELPH: 6.3 G/DL (ref 6.4–8.2)
MCH RBC QN AUTO: 29.8 PG (ref 26–34)
MCHC RBC AUTO-ENTMCNC: 31.3 G/DL (ref 31–37)
MCV RBC AUTO: 95.3 FL (ref 80–100)
METAMYELOCYTES # BLD: 0.2 X10(3) UL
METAMYELOCYTES NFR BLD: 2 %
MONOCYTES # BLD: 0.4 X10(3) UL (ref 0.1–1)
MONOCYTES NFR BLD: 4 %
MORPHOLOGY: NORMAL
NEUTROPHILS # BLD AUTO: 7.12 X10 (3) UL (ref 1.5–7.7)
NEUTROPHILS NFR BLD: 78 %
NEUTS HYPERSEG # BLD: 7.72 X10(3) UL (ref 1.5–7.7)
NITRITE UR QL STRIP.AUTO: POSITIVE
NT-PROBNP SERPL-MCNC: ABNORMAL PG/ML (ref ?–125)
OSMOLALITY SERPL CALC.SUM OF ELEC: 334 MOSM/KG (ref 275–295)
P AXIS: 87 DEGREES
P-R INTERVAL: 162 MS
PH UR STRIP.AUTO: 5 [PH] (ref 4.5–8)
PLATELET # BLD AUTO: 265 10(3)UL (ref 150–450)
PLATELET MORPHOLOGY: NORMAL
POTASSIUM SERPL-SCNC: 4 MMOL/L (ref 3.5–5.1)
PROT UR STRIP.AUTO-MCNC: 100 MG/DL
Q-T INTERVAL: 386 MS
QRS DURATION: 76 MS
QTC CALCULATION (BEZET): 410 MS
R AXIS: 39 DEGREES
RBC # BLD AUTO: 2.58 X10(6)UL (ref 3.8–5.3)
SODIUM SERPL-SCNC: 148 MMOL/L (ref 136–145)
SP GR UR STRIP.AUTO: 1.01 (ref 1–1.03)
T AXIS: 46 DEGREES
TOTAL CELLS COUNTED: 100
UROBILINOGEN UR STRIP.AUTO-MCNC: <2 MG/DL
VENTRICULAR RATE: 68 BPM
WBC # BLD AUTO: 9.9 X10(3) UL (ref 4–11)

## 2019-12-03 PROCEDURE — 93970 EXTREMITY STUDY: CPT | Performed by: EMERGENCY MEDICINE

## 2019-12-03 PROCEDURE — 99223 1ST HOSP IP/OBS HIGH 75: CPT | Performed by: HOSPITALIST

## 2019-12-03 PROCEDURE — 71045 X-RAY EXAM CHEST 1 VIEW: CPT | Performed by: EMERGENCY MEDICINE

## 2019-12-03 RX ORDER — TRAZODONE HYDROCHLORIDE 50 MG/1
50 TABLET ORAL NIGHTLY
COMMUNITY

## 2019-12-03 RX ORDER — INSULIN LISPRO 100 [IU]/ML
INJECTION, SOLUTION INTRAVENOUS; SUBCUTANEOUS
COMMUNITY

## 2019-12-03 RX ORDER — CHOLESTYRAMINE 4 G/9G
4 POWDER, FOR SUSPENSION ORAL DAILY
COMMUNITY

## 2019-12-03 RX ORDER — FUROSEMIDE 10 MG/ML
40 INJECTION INTRAMUSCULAR; INTRAVENOUS ONCE
Status: COMPLETED | OUTPATIENT
Start: 2019-12-03 | End: 2019-12-03

## 2019-12-03 NOTE — ED INITIAL ASSESSMENT (HPI)
Pt to ed by ems with c/o bilat lower ext edema/ nursing home, pt is non-verbal, present is baseline.

## 2019-12-03 NOTE — ED PROVIDER NOTES
Patient Seen in: [facility] Emergency Department      History   Patient presents with:  Swelling Edema    Stated Complaint: bilat lower ext edema    HPI    27-year-old female comes to the hospital complaint being sent from the nursing home with some Temp 97.9 °F (36.6 °C)   Temp src    SpO2 99 %   O2 Device        Current:/54   Pulse 66   Temp 97.9 °F (36.6 °C)   Resp 16   Ht 165.1 cm (5' 5\")   Wt 87 kg   SpO2 99%   BMI 31.92 kg/m²         Physical Exam    HEENT : NCAT, EOMI, PEERL, throat cl All other components within normal limits   CBC W/ DIFFERENTIAL - Abnormal; Notable for the following components:    RBC 2.58 (*)     HGB 7.7 (*)     HCT 24.6 (*)     RDW 15.3 (*)     RDW-SD 52.8 (*)     All other components within normal limits   CBC WITH 18:15          Xr Chest Ap Portable  (cpt=71045)    Result Date: 12/3/2019  PROCEDURE:  XR CHEST AP PORTABLE  (CPT=71045)  TECHNIQUE:  AP chest radiograph was obtained. COMPARISON:  [facility], XR CHEST AP PORTABLE  (CPT=71045), 10/24/2019, 21:24.   Regina Mitchell Medication List                   Present on Admission  Date Reviewed: 6/15/2019          ICD-10-CM Noted POA    Acute cystitis without hematuria N30.00 Unknown Unknown

## 2019-12-03 NOTE — ED NOTES
Pt labs collected and sent, midline iv in place by pt placed of resident, er nurse talk with  [facility]ae Helena Batter care, nurse stated iv is in working condition,

## 2019-12-03 NOTE — ED NOTES
Straight cath urine sample collected and sent, pt tolerated well.call made to [facility] to ask about pt history and reason for transfer.

## 2019-12-04 ENCOUNTER — APPOINTMENT (OUTPATIENT)
Dept: CT IMAGING | Facility: HOSPITAL | Age: 64
DRG: 683 | End: 2019-12-04
Attending: INTERNAL MEDICINE
Payer: MEDICARE

## 2019-12-04 ENCOUNTER — APPOINTMENT (OUTPATIENT)
Dept: CV DIAGNOSTICS | Facility: HOSPITAL | Age: 64
DRG: 683 | End: 2019-12-04
Attending: HOSPITALIST
Payer: MEDICARE

## 2019-12-04 LAB
ANION GAP SERPL CALC-SCNC: 7 MMOL/L (ref 0–18)
BASOPHILS # BLD: 0 X10(3) UL (ref 0–0.2)
BASOPHILS NFR BLD: 0 %
BUN BLD-MCNC: 83 MG/DL (ref 7–18)
BUN/CREAT SERPL: 15.4 (ref 10–20)
CALCIUM BLD-MCNC: 7.7 MG/DL (ref 8.5–10.1)
CHLORIDE SERPL-SCNC: 127 MMOL/L (ref 98–112)
CO2 SERPL-SCNC: 16 MMOL/L (ref 21–32)
CREAT BLD-MCNC: 5.4 MG/DL (ref 0.55–1.02)
DEPRECATED HBV CORE AB SER IA-ACNC: 59.4 NG/ML (ref 18–340)
DEPRECATED RDW RBC AUTO: 51.5 FL (ref 35.1–46.3)
EOSINOPHIL # BLD: 0.7 X10(3) UL (ref 0–0.7)
EOSINOPHIL NFR BLD: 8 %
ERYTHROCYTE [DISTWIDTH] IN BLOOD BY AUTOMATED COUNT: 15.1 % (ref 11–15)
EST. AVERAGE GLUCOSE BLD GHB EST-MCNC: 143 MG/DL (ref 68–126)
GLUCOSE BLD-MCNC: 131 MG/DL (ref 70–99)
GLUCOSE BLD-MCNC: 148 MG/DL (ref 70–99)
GLUCOSE BLD-MCNC: 155 MG/DL (ref 70–99)
GLUCOSE BLD-MCNC: 160 MG/DL (ref 70–99)
GLUCOSE BLD-MCNC: 165 MG/DL (ref 70–99)
GLUCOSE BLD-MCNC: 169 MG/DL (ref 70–99)
HBA1C MFR BLD HPLC: 6.6 % (ref ?–5.7)
HCT VFR BLD AUTO: 21.4 % (ref 35–48)
HGB BLD-MCNC: 6.7 G/DL (ref 12–16)
IRON SATURATION: 23 % (ref 15–50)
IRON SERPL-MCNC: 36 UG/DL (ref 50–170)
LYMPHOCYTES NFR BLD: 0.88 X10(3) UL (ref 1–4)
LYMPHOCYTES NFR BLD: 10 %
MCH RBC QN AUTO: 29.5 PG (ref 26–34)
MCHC RBC AUTO-ENTMCNC: 31.3 G/DL (ref 31–37)
MCV RBC AUTO: 94.3 FL (ref 80–100)
METAMYELOCYTES # BLD: 0.09 X10(3) UL
METAMYELOCYTES NFR BLD: 1 %
MONOCYTES # BLD: 0.18 X10(3) UL (ref 0.1–1)
MONOCYTES NFR BLD: 2 %
MORPHOLOGY: NORMAL
NEUTROPHILS # BLD AUTO: 5.59 X10 (3) UL (ref 1.5–7.7)
NEUTROPHILS NFR BLD: 78 %
NEUTS BAND NFR BLD: 1 %
NEUTS HYPERSEG # BLD: 6.95 X10(3) UL (ref 1.5–7.7)
OSMOLALITY SERPL CALC.SUM OF ELEC: 338 MOSM/KG (ref 275–295)
PLATELET # BLD AUTO: 231 10(3)UL (ref 150–450)
PLATELET MORPHOLOGY: NORMAL
POTASSIUM SERPL-SCNC: 3.5 MMOL/L (ref 3.5–5.1)
RBC # BLD AUTO: 2.27 X10(6)UL (ref 3.8–5.3)
SODIUM SERPL-SCNC: 150 MMOL/L (ref 136–145)
TOTAL CELLS COUNTED: 100
TOTAL IRON BINDING CAPACITY: 155 UG/DL (ref 240–450)
TRANSFERRIN SERPL-MCNC: 104 MG/DL (ref 200–360)
WBC # BLD AUTO: 8.8 X10(3) UL (ref 4–11)

## 2019-12-04 PROCEDURE — 99223 1ST HOSP IP/OBS HIGH 75: CPT | Performed by: INTERNAL MEDICINE

## 2019-12-04 PROCEDURE — 99232 SBSQ HOSP IP/OBS MODERATE 35: CPT | Performed by: INTERNAL MEDICINE

## 2019-12-04 PROCEDURE — 99223 1ST HOSP IP/OBS HIGH 75: CPT | Performed by: OTHER

## 2019-12-04 PROCEDURE — 70450 CT HEAD/BRAIN W/O DYE: CPT | Performed by: INTERNAL MEDICINE

## 2019-12-04 PROCEDURE — 93306 TTE W/DOPPLER COMPLETE: CPT | Performed by: HOSPITALIST

## 2019-12-04 RX ORDER — FOLIC ACID 1 MG/1
1 TABLET ORAL DAILY
Status: DISCONTINUED | OUTPATIENT
Start: 2019-12-04 | End: 2019-12-10

## 2019-12-04 RX ORDER — TRAZODONE HYDROCHLORIDE 50 MG/1
50 TABLET ORAL NIGHTLY
Status: DISCONTINUED | OUTPATIENT
Start: 2019-12-04 | End: 2019-12-10

## 2019-12-04 RX ORDER — FLUDROCORTISONE ACETATE 0.1 MG/1
0.1 TABLET ORAL DAILY
Status: DISCONTINUED | OUTPATIENT
Start: 2019-12-04 | End: 2019-12-10

## 2019-12-04 RX ORDER — CALCIUM ACETATE 667 MG/1
1 CAPSULE ORAL 2 TIMES DAILY
Status: DISCONTINUED | OUTPATIENT
Start: 2019-12-04 | End: 2019-12-10

## 2019-12-04 RX ORDER — CHOLESTYRAMINE LIGHT 4 G/5.7G
4 POWDER, FOR SUSPENSION ORAL DAILY
Status: DISCONTINUED | OUTPATIENT
Start: 2019-12-04 | End: 2019-12-10

## 2019-12-04 RX ORDER — DEXTROSE MONOHYDRATE 50 MG/ML
INJECTION, SOLUTION INTRAVENOUS CONTINUOUS
Status: DISCONTINUED | OUTPATIENT
Start: 2019-12-04 | End: 2019-12-07

## 2019-12-04 RX ORDER — MELATONIN
325 2 TIMES DAILY WITH MEALS
Status: DISCONTINUED | OUTPATIENT
Start: 2019-12-04 | End: 2019-12-10

## 2019-12-04 RX ORDER — HYDRALAZINE HYDROCHLORIDE 20 MG/ML
5 INJECTION INTRAMUSCULAR; INTRAVENOUS EVERY 6 HOURS PRN
Status: DISCONTINUED | OUTPATIENT
Start: 2019-12-04 | End: 2019-12-04

## 2019-12-04 RX ORDER — BUMETANIDE 0.25 MG/ML
2 INJECTION, SOLUTION INTRAMUSCULAR; INTRAVENOUS
Status: DISCONTINUED | OUTPATIENT
Start: 2019-12-04 | End: 2019-12-05

## 2019-12-04 RX ORDER — RISPERIDONE 0.25 MG/1
0.5 TABLET, FILM COATED ORAL 2 TIMES DAILY
Status: DISCONTINUED | OUTPATIENT
Start: 2019-12-04 | End: 2019-12-10

## 2019-12-04 RX ORDER — ONDANSETRON 2 MG/ML
4 INJECTION INTRAMUSCULAR; INTRAVENOUS EVERY 6 HOURS PRN
Status: DISCONTINUED | OUTPATIENT
Start: 2019-12-04 | End: 2019-12-10

## 2019-12-04 RX ORDER — SODIUM BICARBONATE 325 MG/1
650 TABLET ORAL 2 TIMES DAILY
Status: DISCONTINUED | OUTPATIENT
Start: 2019-12-04 | End: 2019-12-06

## 2019-12-04 RX ORDER — LABETALOL 200 MG/1
200 TABLET, FILM COATED ORAL 2 TIMES DAILY
Status: DISCONTINUED | OUTPATIENT
Start: 2019-12-04 | End: 2019-12-10

## 2019-12-04 RX ORDER — HYDROCODONE BITARTRATE AND ACETAMINOPHEN 5; 325 MG/1; MG/1
1 TABLET ORAL EVERY 6 HOURS PRN
Status: DISCONTINUED | OUTPATIENT
Start: 2019-12-04 | End: 2019-12-10

## 2019-12-04 RX ORDER — IPRATROPIUM BROMIDE AND ALBUTEROL SULFATE 2.5; .5 MG/3ML; MG/3ML
3 SOLUTION RESPIRATORY (INHALATION) EVERY 6 HOURS PRN
Status: DISCONTINUED | OUTPATIENT
Start: 2019-12-04 | End: 2019-12-10

## 2019-12-04 RX ORDER — BACLOFEN 20 MG/1
20 TABLET ORAL 3 TIMES DAILY
Status: DISCONTINUED | OUTPATIENT
Start: 2019-12-04 | End: 2019-12-10

## 2019-12-04 RX ORDER — DEXTROSE MONOHYDRATE 25 G/50ML
50 INJECTION, SOLUTION INTRAVENOUS
Status: DISCONTINUED | OUTPATIENT
Start: 2019-12-04 | End: 2019-12-10

## 2019-12-04 RX ORDER — BISACODYL 10 MG
10 SUPPOSITORY, RECTAL RECTAL DAILY PRN
Status: DISCONTINUED | OUTPATIENT
Start: 2019-12-04 | End: 2019-12-10

## 2019-12-04 RX ORDER — LEVETIRACETAM 500 MG/1
1000 TABLET ORAL DAILY
Status: DISCONTINUED | OUTPATIENT
Start: 2019-12-04 | End: 2019-12-04

## 2019-12-04 RX ORDER — DOCUSATE SODIUM 100 MG/1
100 CAPSULE, LIQUID FILLED ORAL 2 TIMES DAILY
Status: DISCONTINUED | OUTPATIENT
Start: 2019-12-04 | End: 2019-12-10

## 2019-12-04 RX ORDER — AMLODIPINE BESYLATE 5 MG/1
5 TABLET ORAL DAILY
Status: DISCONTINUED | OUTPATIENT
Start: 2019-12-04 | End: 2019-12-10

## 2019-12-04 RX ORDER — ACETAMINOPHEN 325 MG/1
650 TABLET ORAL EVERY 6 HOURS PRN
Status: DISCONTINUED | OUTPATIENT
Start: 2019-12-04 | End: 2019-12-10

## 2019-12-04 RX ORDER — ESCITALOPRAM OXALATE 10 MG/1
10 TABLET ORAL DAILY
Status: DISCONTINUED | OUTPATIENT
Start: 2019-12-04 | End: 2019-12-10

## 2019-12-04 RX ORDER — HEPARIN SODIUM 5000 [USP'U]/ML
5000 INJECTION, SOLUTION INTRAVENOUS; SUBCUTANEOUS EVERY 12 HOURS SCHEDULED
Status: DISCONTINUED | OUTPATIENT
Start: 2019-12-04 | End: 2019-12-10

## 2019-12-04 RX ORDER — HYDRALAZINE HYDROCHLORIDE 20 MG/ML
20 INJECTION INTRAMUSCULAR; INTRAVENOUS EVERY 6 HOURS PRN
Status: DISCONTINUED | OUTPATIENT
Start: 2019-12-04 | End: 2019-12-10

## 2019-12-04 RX ORDER — METOCLOPRAMIDE HYDROCHLORIDE 5 MG/ML
5 INJECTION INTRAMUSCULAR; INTRAVENOUS EVERY 8 HOURS PRN
Status: DISCONTINUED | OUTPATIENT
Start: 2019-12-04 | End: 2019-12-10

## 2019-12-04 NOTE — PROGRESS NOTES
[facility] Neurology Initial Evaluation    Anuj Payor Patient Status:  Inpatient    10/30/1955 MRN [id number]   [facility] 2NE-A Attending Hayley Ho MD   Hosp Day # 1 PCP Daphne Allen MD     [address] Her smoking use included cigarettes. She smoked 1.00 pack per day. She has never used smokeless tobacco. She reports that she does not drink alcohol or use drugs.     ALLERGIES:    Lisinopril              UNKNOWN, OTHER (SEE COMMENTS)    Comment:hyperkalemi under breasts, jerald-area, and abd. folds, Disp: , Rfl: , Unknown at Unknown time  levetiracetam 1000 MG Oral Tab, Take 1,000 mg by mouth daily. , Disp: , Rfl: , 10/24/2019 at Unknown time  HYDROcodone-acetaminophen 5-325 MG Oral Tab, Take 1 tablet by mouth hours as needed for Itching., Disp: , Rfl: , 10/24/2019 at Unknown time  triamcinolone acetonide 0.1 % External Cream, Apply topically daily. , Disp: , Rfl: , Unknown at Unknown time  insulin detemir 100 UNIT/ML Subcutaneous Solution Pen-injector, Inject 5 5000 UNIT/ML injection 5,000 Units, 5,000 Units, Subcutaneous, 2 times per day  HYDROcodone-acetaminophen (NORCO) 5-325 MG per tab 1 tablet, 1 tablet, Oral, Q6H PRN  Umeclidinium Bromide (INCRUSE ELLIPTA) 62.5 MCG/INH inhaler 1 puff, 1 puff, Inhalation, Da 21.4 12/04/2019    .0 12/04/2019    CREATSERUM 5.40 12/04/2019    BUN 83 12/04/2019     12/04/2019    K 3.5 12/04/2019     12/04/2019    CO2 16.0 12/04/2019     12/04/2019    CA 7.7 12/04/2019    ALB 2.2 12/03/2019    ALKPHO 65 12

## 2019-12-04 NOTE — PHYSICAL THERAPY NOTE
Physical Therapy    Orders received per [facility] protocol. Reviewed chart which reveals pt is dependent for all adl's and mobility at baseline. Pt is long term resident at [facility]. Pt is not appropriate candidate for PT at this time, will sign off of case.

## 2019-12-04 NOTE — OCCUPATIONAL THERAPY NOTE
OT orders received via [facility] ADL screening; chart reviewed. Pt has total  assist with all ADL/transfers via lift at baseline. No skilled therapy is indicated at this time. Will sign off from OT.

## 2019-12-04 NOTE — CONSULTS
[facility]  Report of Inpatient Wound Care Consultation     Galileo Shepherd Patient Status:  Inpatient    10/30/1955 MRN [id number]   [facility] 2NE-A Attending Nichole Mcdonald MD   Hosp Day # 1 PCP Josseline Fox MD     REASON Rk Dominguez --    HCT 24.6*  --  21.4*  --   --    .0  --  231.0  --   --    K 4.0  --  3.5  --   --    CREATSERUM 5.31*  --  5.40*  --   --    BUN 75*  --  83*  --   --    *  --  155*  --   --    CA 8.2*  --  7.7*  --   --    ALB 2.2*  --   --   --   -- [phone]

## 2019-12-04 NOTE — SLP NOTE
ADULT SWALLOWING EVALUATION    ASSESSMENT    ASSESSMENT/OVERALL IMPRESSION:  Patient seen for swallowing assessment. She is known to this department from recent admission at the end of October of this year.   VFSS completed 10/28/2019 with resulting recomm chronic congestive heart failure, unspecified heart failure type [facility])      Past Medical History  Past Medical History:   Diagnosis Date   • Altered mental status     A&OX2 PER NORMAL    • Candidiasis    • Cervical disc disease with myelopathy    • Chronic Phase of Swallow: Impaired  Laryngeal Elevation Timing: Appears impaired  Laryngeal Elevation Strength: Appears impaired  Laryngeal Elevation Coordination: Appears impaired  (Please note: Silent aspiration cannot be evaluated clinically.  Videofluoroscopic

## 2019-12-04 NOTE — DIETARY NOTE
[facility]    NUTRITION INITIAL ASSESSMENT    Pt does not meet malnutrition criteria.     NUTRITION DIAGNOSIS/PROBLEM:    Inadequate energy intake related to decreased ability to consume sufficient energy intake as evidenced by failed swallow study and Addresses: Yes    NUTRITION RELATED PHYSICAL FINDINGS:    Unable to conduct nutrition focused physical exam at time of visit.      NUTRITION PRESCRIPTION:91.3 kg  Calories: 8514-3500 calories/day (17-20 calories per kg)  Protein: 55-73 grams protein/day (0.

## 2019-12-04 NOTE — CM/SW NOTE
12/04/19 1300   CM/SW Referral Data   Referral Source    Reason for Referral Discharge planning   Informant Other   Patient [address] Name   [facility])   Case discussed in rounds.  C

## 2019-12-04 NOTE — PROGRESS NOTES
Pharmacy Note: Renal dose adjustment for Metoclopramide (Reglan)  Serina Edouard has been prescribed Metoclopramide (Reglan) 10 mg every 8 hours as needed. Estimated Creatinine Clearance: 9.9 mL/min (A) (based on SCr of 5.31 mg/dL (H)).     Her calc

## 2019-12-04 NOTE — CONSULTS
[facility]  Inpatient Wound Care Contact Note    Martha Blood Patient Status:  Inpatient    10/30/1955 MRN [id number]   [facility] 2NE-A Attending Stacy Elliott MD   Hosp Day # 1 PCP Anali Galvez MD     Consult received,  RN

## 2019-12-04 NOTE — PLAN OF CARE
Assumed care of pt. At 2230. Pt. Non-verbal and moaning in bed, per Nursing home that is her baseline. Unable to assess orientation due to pt.  Condition, she seems to follow simple commands with mimicry (stick out your tongue, smile) but would not answer Patient/Family is able to understand and participate in their care  Description  Interventions:  - Assess communication ability and preferred communication style  - Implement communication aides and strategies  - Use visual cues when possible  - Listen att needed  Outcome: Not Progressing  Pre-existing pressure ulcers on sacrum and buttocks       Problem: MUSCULOSKELETAL - ADULT  Goal: Return mobility to safest level of function  Description  INTERVENTIONS:  - Assess patient stability and activity tolerance ability to communicate wants and needs  Outcome: Not Progressing  Baseline moans for communication

## 2019-12-04 NOTE — CONSULTS
[facility]  Report of Consultation    Alicia Herr Patient Status:  Inpatient    10/30/1955 MRN [id number]   [facility] 2NE-A Attending Mirna Desouza MD   Hosp Day # 1 PCP Jerilyn Ch MD     Reason for Consultation:  CKD V/e tab 4 tablet, 4 tablet, Oral, Q15 Min PRN **OR** dextrose 50 % injection 50 mL, 50 mL, Intravenous, Q15 Min PRN **OR** glucose (DEX4) oral liquid 30 g, 30 g, Oral, Q15 Min PRN **OR** Glucose-Vitamin C (DEX-4) chewable tab 8 tablet, 8 tablet, Oral, Q15 Min solution 3 mL, 3 mL, Nebulization, Q6H PRN  •  insulin detemir (LEVEMIR) 100 UNIT/ML flextouch 5 Units, 5 Units, Subcutaneous, Nightly  •  hydrALAzine HCl (APRESOLINE) injection 5 mg, 5 mg, Intravenous, Q6H PRN  •  levETIRAcetam (KEPPRA) 500 mg in sodium c  12/04/2019    CO2 16.0 12/04/2019     12/04/2019    CA 7.7 12/04/2019    ALB 2.2 12/03/2019    ALKPHO 65 12/03/2019    BILT 0.2 12/03/2019    TP 6.3 12/03/2019    AST 8 12/03/2019    ALT 12 12/03/2019    PGLU 148 12/04/2019       BUN (mg/d

## 2019-12-04 NOTE — H&P
[facility] HOSPITALIST  History and Physical     Nereida Howell Patient Status:  Inpatient    10/30/1955 MRN [id number]   [facility] 2NE-A Attending Dang Campoverde MD   Hosp Day # 1 PCP Eliud Rahman MD     Chief Complaint: LE edema tract infection)         Past Surgical History:   Past Surgical History:   Procedure Laterality Date   • BACK SURGERY      c5-c7 herniated disc   • HC INCISION AND DRAINAGE PERIRECTAL ABSCESS         Social History:  reports that she has quit smoking.  Her Ketoconazole 2 % External Shampoo, Apply topically every third day. To scalp for seborrheic dermatitis, Disp: , Rfl:   nystatin [id number] UNIT/GM External Cream, Apply 1 Application topically 2 (two) times daily. To groin, under breasts, jerald-area, and abd. Pen-injector, Inject 5 Units into the skin nightly., Disp: 3 pen, Rfl: 0  Glycopyrrolate (SEEBRI NEOHALER) 15.6 MCG Inhalation Cap, Inhale 1 capsule into the lungs 2 (two) times daily. , Disp: , Rfl:         Review of Systems:   A comprehensive 14 point rev approaching ESRD, per renal  3. AOCD, monitor Hgb  4. Seizure d/o, resume home meds, on Keppra  5. HTN, controlled  6. Depression, resume home meds  7.  COPD, stable  8. UTI, on IV abx, f/u UCx    Quality:  · DVT Prophylaxis: heparin  · CODE status: Full  ·

## 2019-12-04 NOTE — PLAN OF CARE
Pt received at 0730 resting in bed. Nonverbal, constant moaning, appears comfortable. BP elevated 353'R systolic, order obtained for PRN hydralazine IV push. Unable to take anything PO. Evaluated by SLP, maintain NPO. Total assist and total lift.  Incontine goal  Description  INTERVENTIONS:  - Encourage pt to monitor pain and request assistance  - Assess pain using appropriate pain scale  - Administer analgesics based on type and severity of pain and evaluate response  - Implement non-pharmacological measures and hypoglycemia  - Administer ordered medications to maintain glucose within target range  - Assess barriers to adequate nutritional intake and initiate nutrition consult as needed  - Instruct patient on self management of diabetes  Outcome: Progressing

## 2019-12-05 LAB
ALBUMIN SERPL-MCNC: 2 G/DL (ref 3.4–5)
ALBUMIN/GLOB SERPL: 0.5 {RATIO} (ref 1–2)
ALP LIVER SERPL-CCNC: 60 U/L (ref 50–130)
ALT SERPL-CCNC: <6 U/L (ref 13–56)
ANION GAP SERPL CALC-SCNC: 9 MMOL/L (ref 0–18)
AST SERPL-CCNC: 4 U/L (ref 15–37)
BILIRUB SERPL-MCNC: 0.2 MG/DL (ref 0.1–2)
BUN BLD-MCNC: 80 MG/DL (ref 7–18)
BUN/CREAT SERPL: 15.6 (ref 10–20)
CALCIUM BLD-MCNC: 7.7 MG/DL (ref 8.5–10.1)
CHLORIDE SERPL-SCNC: 124 MMOL/L (ref 98–112)
CO2 SERPL-SCNC: 16 MMOL/L (ref 21–32)
CREAT BLD-MCNC: 5.14 MG/DL (ref 0.55–1.02)
GLOBULIN PLAS-MCNC: 3.7 G/DL (ref 2.8–4.4)
GLUCOSE BLD-MCNC: 169 MG/DL (ref 70–99)
GLUCOSE BLD-MCNC: 172 MG/DL (ref 70–99)
GLUCOSE BLD-MCNC: 172 MG/DL (ref 70–99)
GLUCOSE BLD-MCNC: 182 MG/DL (ref 70–99)
GLUCOSE BLD-MCNC: 195 MG/DL (ref 70–99)
GLUCOSE BLD-MCNC: 208 MG/DL (ref 70–99)
GLUCOSE BLD-MCNC: 209 MG/DL (ref 70–99)
M PROTEIN MFR SERPL ELPH: 5.7 G/DL (ref 6.4–8.2)
OSMOLALITY SERPL CALC.SUM OF ELEC: 336 MOSM/KG (ref 275–295)
POTASSIUM SERPL-SCNC: 3.4 MMOL/L (ref 3.5–5.1)
SODIUM SERPL-SCNC: 149 MMOL/L (ref 136–145)

## 2019-12-05 PROCEDURE — 99232 SBSQ HOSP IP/OBS MODERATE 35: CPT | Performed by: INTERNAL MEDICINE

## 2019-12-05 PROCEDURE — 99233 SBSQ HOSP IP/OBS HIGH 50: CPT | Performed by: OTHER

## 2019-12-05 PROCEDURE — 99222 1ST HOSP IP/OBS MODERATE 55: CPT | Performed by: NURSE PRACTITIONER

## 2019-12-05 PROCEDURE — 95819 EEG AWAKE AND ASLEEP: CPT | Performed by: OTHER

## 2019-12-05 RX ORDER — BUMETANIDE 0.25 MG/ML
2 INJECTION, SOLUTION INTRAMUSCULAR; INTRAVENOUS
Status: COMPLETED | OUTPATIENT
Start: 2019-12-05 | End: 2019-12-07

## 2019-12-05 RX ORDER — SULFAMETHOXAZOLE AND TRIMETHOPRIM 800; 160 MG/1; MG/1
1 TABLET ORAL DAILY
Status: DISCONTINUED | OUTPATIENT
Start: 2019-12-05 | End: 2019-12-06

## 2019-12-05 NOTE — PROCEDURES
[address] EEG report    Name: Saba Vincent    Date of Study: 12/5/2019      Routine EEG Report    Ordering Physician: Ivonne Garcia MD                               Primary Care Physician: MD Juan Strauss Furoate-Vilanterol (BREO ELLIPTA) 200-25 MCG/INH inhaler 1 puff, 1 puff, Inhalation, Daily  folic acid (FOLVITE) tab 1 mg, 1 mg, Oral, Daily  Heparin Sodium (Porcine) 5000 UNIT/ML injection 5,000 Units, 5,000 Units, Subcutaneous, 2 times per day  HYDROcodo SUPERIMPOSED LARGE AMPLITUDE SHARP SLOW WAVES.    CT: 12/04/2019 UNREMARKABLE  MRI: 12/23/2018 NO ACUTE INTRACRANIAL INFARCTION  PT STATE: LETHARGIC, actiAPHASIC, UNABLE TO ORIENT  DURING EEG: LETHARGIC, AWAKE, ASLEEP  HV/IPS: NOT PEFORMED  PORTABLE 5    In

## 2019-12-05 NOTE — SLP NOTE
ADULT SWALLOWING EVALUATION    ASSESSMENT    ASSESSMENT/OVERALL IMPRESSION:  Patient seen to reassess swallow function. She is more alert today and tracking though continues to moan and not really verbalizing for me.   She does not follow commands consiste Diagnosis Date   • Altered mental status     A&OX2 PER NORMAL    • Candidiasis    • Cervical disc disease with myelopathy    • Chronic pain    • CKD (chronic kidney disease)    • Constipation    • Convulsions (HCC)    • COPD (chronic obstructive pulmonar Status: Unlabored  Consistencies Trialed: Thin liquids; Nectar thick liquids;Puree;Hard solid  Method of Presentation: Staff/Clinician assistance;Cup;Straw;Single sips; Consecutive swallows  Patient Positioning: Upright;Midline(in bed)    Oral Phase of Deliaall Dale MENA CCC-SLP  Pager [phone]

## 2019-12-05 NOTE — PROGRESS NOTES
[facility]  Progress Note    Marcin Mendoza Patient Status:  Inpatient    10/30/1955 MRN [id number]   [facility] 2NE-A Attending Rochelle Murphy MD   Hosp Day # 2 PCP Tatianna Sherwood MD     CC: Dysphagia, lower extremity swelling ed 5.14 12/05/2019    BUN 80 12/05/2019     12/05/2019    K 3.4 12/05/2019     12/05/2019    CO2 16.0 12/05/2019     12/05/2019    CA 7.7 12/05/2019    ALB 2.0 12/05/2019    ALKPHO 60 12/05/2019    BILT 0.2 12/05/2019    TP 5.7 12/05/2019 PRN  ondansetron HCl (ZOFRAN) injection 4 mg, 4 mg, Intravenous, Q6H PRN  Metoclopramide HCl (REGLAN) injection 5 mg, 5 mg, Intravenous, Q8H PRN  amLODIPine Besylate (NORVASC) tab 5 mg, 5 mg, Oral, Daily  baclofen (LIORESAL) tab 20 mg, 20 mg, Oral, TID  bi Sodium (ROCEPHIN) 1 g in sodium chloride 0.9% 100 mL MBP/ADD-vantage, 1 g, Intravenous, Q24H        ASSESSMENT / PLAN:     1. Bilateral pedal edema, acute kidney injury on chronic kidney disease stage V  1. Nephrology on consult  2.  Progressive kidney fail

## 2019-12-05 NOTE — PROGRESS NOTES
[facility]  Progress Note    Nereida Howell Patient Status:  Inpatient    10/30/1955 MRN [id number]   [facility] 2NE-A Attending Marisela Spangler MD   Hosp Day # 1 PCP Eliud Rahman MD     CC: Dysphagia, lower extremity swelling ed 12/04/2019    HCT 21.4 12/04/2019    .0 12/04/2019    CREATSERUM 5.40 12/04/2019    BUN 83 12/04/2019     12/04/2019    K 3.5 12/04/2019     12/04/2019    CO2 16.0 12/04/2019     12/04/2019    CA 7.7 12/04/2019    PGLU 169 12/04/2 PRN  Metoclopramide HCl (REGLAN) injection 5 mg, 5 mg, Intravenous, Q8H PRN  amLODIPine Besylate (NORVASC) tab 5 mg, 5 mg, Oral, Daily  baclofen (LIORESAL) tab 20 mg, 20 mg, Oral, TID  bisacodyl (DULCOLAX) rectal suppository 10 mg, 10 mg, Rectal, Daily PRN MBP/ADD-vantage, 1 g, Intravenous, Q24H        ASSESSMENT / PLAN:     1. Bilateral pedal edema, acute kidney injury on chronic kidney disease stage V  1. Nephrology on consult  2. Progressive kidney failure due to diabetes mellitus and hypertension  3.  IV

## 2019-12-05 NOTE — PLAN OF CARE
Assumed care of pt at [address] [place] [address]. Pt is nonverbal, cannot answer orientation questions. Pt moans but does not appear to be in pain. Pt maintained on seizure precautions. On RA with SpO2 at 96%, lungs clear  NSR per tele.  Pt denies chest pain, SOB, palpitation response  - Consider cultural and social influences on pain and pain management  - Manage/alleviate anxiety  - Utilize distraction and/or relaxation techniques  - Monitor for opioid side effects  - Notify MD/LIP if interventions unsuccessful or patient rep measures for life threatening arrhythmias  - Monitor electrolytes and administer replacement therapy as ordered  Outcome: Progressing     Problem: RESPIRATORY - ADULT  Goal: Achieves optimal ventilation and oxygenation  Description  INTERVENTIONS:  - Asses ordered medications to maintain glucose within target range  - Assess barriers to adequate nutritional intake and initiate nutrition consult as needed  - Instruct patient on self management of diabetes  Outcome: Progressing  Goal: Electrolytes maintained w Advance activity as appropriate  - Communicate ordered activity level and limitations with patient/family  Outcome: Progressing     Problem: NEUROLOGICAL - ADULT  Goal: Achieves stable or improved neurological status  Description  INTERVENTIONS  - Assess f care  Description  Interventions:  - Assess ability and encourage patient to participate in ADLs to maximize function  - Promote sitting position while performing ADLs such as feeding, grooming, and bathing  - Educate and encourage patient/family in [place]

## 2019-12-05 NOTE — PROGRESS NOTES
[facility]  Nephrology Progress Note    Declan Martines Patient Status:  Inpatient    10/30/1955 MRN [id number]   [facility] 2NE-A Attending Stefanie Melendez MD   Hosp Day # 2 PCP Romina Cao MD       SUBJECTIVE:  No acute event 15.0* 16.0* 16.0*   BUN 75* 83* 80*   CREATSERUM 5.31* 5.40* 5.14*   CA 8.2* 7.7* 7.7*   * 155* 172*       Recent Labs   Lab 12/03/19  1616 12/05/19  0618   ALT 12* <6*   AST 8* 4*   ALB 2.2* 2.0*       Recent Labs   Lab 12/04/19  1717 12/04/19  215 Oral, Q6H PRN  Umeclidinium Bromide (INCRUSE ELLIPTA) 62.5 MCG/INH inhaler 1 puff, 1 puff, Inhalation, Daily  traZODone HCl (DESYREL) tab 50 mg, 50 mg, Oral, Nightly  sodium bicarbonate tab 650 mg, 650 mg, Oral, BID  risperiDONE (RISPERDAL) tab 0.5 mg, 0.5

## 2019-12-05 NOTE — PROGRESS NOTES
[facility] [facility] Neurology Progress Note    Nereida Lynda Patient Status:  Inpatient    10/30/1955 MRN [id number]   [facility] 2NE-A Attending Jose Gunter MD   Hosp Day # 2 PCP Eliud Rahman MD         Subjective:  Luis F Deaning HS   • amLODIPine Besylate  5 mg Oral Daily   • baclofen  20 mg Oral TID   • calcium acetate  1 capsule Oral BID   • cholestyramine light  4 g Oral Daily   • escitalopram  10 mg Oral Daily   • docusate sodium  100 mg Oral BID   • ferrous sulfate  325 mg Or unspecified altered mental status type     Pyelonephritis     Nosocomial pneumonia     Leukocytosis, unspecified type     Stage 3 chronic kidney disease (HCC)     CKD (chronic kidney disease), stage IV (HCC)     Hypernatremia     Dehydration     Yeast derm

## 2019-12-05 NOTE — CONSULTS
[facility] Patient Status:  Inpatient    10/30/1955 MRN [id number]   [facility] 2NE-A Attending Ash Donnelly MD   Hosp Day # 2 PCP Diego Dumont MD     Date of Consult:  making contact with pt's guardian. HPI obtained from [address] record and guardian.       Medical History:       Past Medical History:   Diagnosis Date   • Altered mental status     A&OX2 PER NORMAL    • Candidiasis    • Cervical disc disease with myelopathy tablet, 4 tablet, Oral, Q15 Min PRN **OR** dextrose 50 % injection 50 mL, 50 mL, Intravenous, Q15 Min PRN **OR** glucose (DEX4) oral liquid 30 g, 30 g, Oral, Q15 Min PRN **OR** Glucose-Vitamin C (DEX-4) chewable tab 8 tablet, 8 tablet, Oral, Q15 Min PRN  • (LEVEMIR) 100 UNIT/ML flextouch 5 Units, 5 Units, Subcutaneous, Nightly  •  levETIRAcetam (KEPPRA) 500 mg in sodium chloride 0.9% 100 mL IVPB, 500 mg, Intravenous, Q12H  •  dextrose 5% infusion, , Intravenous, Continuous  •  bumetanide (BUMEX) 0.25 MG/ML i (93617)  COMPARISON:  [facility] , CT, CT BRAIN OR HEAD (55320), 10/29/2019, 10:56. INDICATIONS:  new onset dysphagia, rule out cva  TECHNIQUE:  Noncontrast CT scanning is performed through the brain. Dose reduction techniques were used.  Dose information is t chronically ill, debilitated. NAD. HEENT: Yellowish crusting around L eye. RESPIRATORY: No increased effort at rest.  NEUROLOGIC: Alert and oriented to  and place only. Tracking with eyes, does not follow complex commands.   PSYCHIATRIC:  No agitation care she needed. Griselda Huerta shared that once he documented that Bar opposed DNR, he cannot change it.  He expressed conflict as he recognizes obstacle of making Ashleyrachel comfortable at this time, citing his understanding of her decline and advanced disea letter substantiating hospice qualification will need to go before a  for the decision to add hospice services, and he advised that in this case, it does not need court/judicial review.  He advised that he will need to review enrollment documents and c Document on file reflecting [address] as decision-making agent for disabled individual.  Healthcare Agent Appointed: Yes  Healthcare Agent's Name: Andra [address] Agent's Phone Number: Office: [phone] / unspecified altered mental status type     Pyelonephritis     Nosocomial pneumonia     Leukocytosis, unspecified type     Stage 3 chronic kidney disease (HCC)     CKD (chronic kidney disease), stage IV (HCC)     Hypernatremia     Dehydration     Yeast derm Aftab Ramos and PS message to Dr. Crystal Lagos. Thank you for inviting Palliative Care Service to participate in the care of Chari Juarez.        Palliative Care Service will follow up peripherally tomorrow for outcome of hospice eval.       FABI Diaz,

## 2019-12-05 NOTE — PROGRESS NOTES
[id number] Lexi Sandhu Neurology Progress Note    Galileo Shepherd Patient Status:  Inpatient    10/30/1955 MRN [id number]   [facility] 2NE-A Attending Desean Gillis MD   Hosp Day # 2 PCP Josseline Fox MD         Subjective:  Bijal Rivas Intravenous BID (Diuretic)   • Sulfamethoxazole-TMP DS  1 tablet Oral Daily   • Insulin Aspart Pen  1-10 Units Subcutaneous TID AC and HS   • amLODIPine Besylate  5 mg Oral Daily   • baclofen  20 mg Oral TID   • calcium acetate  1 capsule Oral BID   • chol unspecified     Altered mental status, unspecified altered mental status type     Pyelonephritis     Nosocomial pneumonia     Leukocytosis, unspecified type     Stage 3 chronic kidney disease (HCC)     CKD (chronic kidney disease), stage IV (HCC)     Hyper Temp 98.7 °F (37.1 °C) (Oral)   Resp 20   Ht 65\"   Wt 202 lb 3.2 oz (91.7 kg)   SpO2 97%   BMI 33.65 kg/m²     Patient Vitals for the past 24 hrs:   BP Temp Temp src Pulse Resp SpO2 Weight   12/05/19 1618 150/57 98.7 °F (37.1 °C) Oral 72 20 97 % —   12/ Lab Results   Component Value Date    BUN 80 (H) 12/05/2019    BUN 83 (H) 12/04/2019    BUN 75 (H) 12/03/2019     Lab Results   Component Value Date    CREATSERUM 5.14 (H) 12/05/2019    CREATSERUM 5.40 (H) 12/04/2019    CREATSERUM 5.31 (H) 12/03/2019 seizures and no epileptiform activity - no epileptiform activity noted with changes in her mental status (ie groaning episodes) during EEG    She is overall more alert      Plan:  - Continue Keppra 500mg BID.    - Keppra level pending   -   Plan for EEG in

## 2019-12-06 LAB
ANION GAP SERPL CALC-SCNC: 7 MMOL/L (ref 0–18)
BUN BLD-MCNC: 71 MG/DL (ref 7–18)
BUN/CREAT SERPL: 14.4 (ref 10–20)
CALCIUM BLD-MCNC: 7.7 MG/DL (ref 8.5–10.1)
CHLORIDE SERPL-SCNC: 121 MMOL/L (ref 98–112)
CO2 SERPL-SCNC: 16 MMOL/L (ref 21–32)
CREAT BLD-MCNC: 4.92 MG/DL (ref 0.55–1.02)
GLUCOSE BLD-MCNC: 195 MG/DL (ref 70–99)
GLUCOSE BLD-MCNC: 206 MG/DL (ref 70–99)
GLUCOSE BLD-MCNC: 217 MG/DL (ref 70–99)
GLUCOSE BLD-MCNC: 253 MG/DL (ref 70–99)
GLUCOSE BLD-MCNC: 276 MG/DL (ref 70–99)
LEVETIRACETAM (KEPPRA): 27 UG/ML
OSMOLALITY SERPL CALC.SUM OF ELEC: 324 MOSM/KG (ref 275–295)
POTASSIUM SERPL-SCNC: 4 MMOL/L (ref 3.5–5.1)
SODIUM SERPL-SCNC: 144 MMOL/L (ref 136–145)

## 2019-12-06 PROCEDURE — 99232 SBSQ HOSP IP/OBS MODERATE 35: CPT | Performed by: NURSE PRACTITIONER

## 2019-12-06 PROCEDURE — 99232 SBSQ HOSP IP/OBS MODERATE 35: CPT | Performed by: INTERNAL MEDICINE

## 2019-12-06 RX ORDER — SODIUM BICARBONATE 325 MG/1
1300 TABLET ORAL 2 TIMES DAILY
Status: DISCONTINUED | OUTPATIENT
Start: 2019-12-06 | End: 2019-12-10

## 2019-12-06 NOTE — DIETARY NOTE
[facility]     NUTRITION FOLLOW UP ASSESSMENT     Pt does not meet malnutrition criteria.     NUTRITION DIAGNOSIS/PROBLEM:     Inadequate energy intake related to decreased ability to consume sufficient energy intake as evidenced by failed swallow chicho Renal, Carb Controlled 1800 kcal/60 gm, 2 gm Sodium, 1.5 L fluid restriction, Chopped, Nectar Thick Liquids  Oral Nutrition Supplements: Nepro at breakfast     FOOD/NUTRITION RELATED HISTORY:  Intake: 0%, 100%, 60%  Intake Meeting Needs: No, added Nepro-1x

## 2019-12-06 NOTE — PROGRESS NOTES
[id number] Lexi Sandhu Neurology Progress Note    Alicia Herr Patient Status:  Inpatient    10/30/1955 MRN [id number]   [facility] 2NE-A Attending Zheng Vieira MD   Hosp Day # 3 PCP Jerilyn Ch MD     CC: Aphasia    Subjective: cerebral dysfunction. No epileptiform activity, focal slowing or clinical or electrographic seizures were noted on this awake and asleep recording.    Of note, patient was noted to have episodes of mental status change during this recording with no epilept

## 2019-12-06 NOTE — CONSULTS
INFECTIOUS DISEASE CONSULT NOTE    Jayde Galeana Patient Status:  Inpatient    10/30/1955 MRN [id number]   [facility] 2NE-A Attending Rachael Number, [address] [facility] Day # 3 PCP Jessica Fraire Family History   Problem Relation Age of Onset   • Cancer Neg       reports that she has quit smoking. Her smoking use included cigarettes. She smoked 1.00 pack per day.  She has never used smokeless tobacco. She reports that she does not drink alcohol 0.1 mg, 0.1 mg, Oral, Daily  •  Fluticasone Furoate-Vilanterol (BREO ELLIPTA) 200-25 MCG/INH inhaler 1 puff, 1 puff, Inhalation, Daily  •  folic acid (FOLVITE) tab 1 mg, 1 mg, Oral, Daily  •  Heparin Sodium (Porcine) 5000 UNIT/ML injection 5,000 Units, 5,0 motion of all extremities.   1+ edema legs  Integument: No rash    Laboratory Data:    Recent Labs   Lab 12/03/19  1616 12/04/19  0620   RBC 2.58* 2.27*   HGB 7.7* 6.7*   HCT 24.6* 21.4*   MCV 95.3 94.3   MCH 29.8 29.5   MCHC 31.3 31.3   RDW 15.3* 15.1*   N Dictated by: Alvin Bucio MD on 12/04/2019 at 13:36     Approved by: Alvin Bucio MD on 12/04/2019 at 13:38          Us Venous Doppler Leg Bilat - Diag Img (cpt=93970)    Result Date: 12/3/2019  PROCEDURE:  US VENOUS DOPPLER LEG BILAT - DIAG IM vascular congestion and mild pulmonary edema. 2. Stable mild cardiomegaly. Dictated by: Master Soni DO on 12/03/2019 at 16:06     Approved by: Master Soni DO on 12/03/2019 at 16:08           ASSESSMENT/ PLAN    1. Bacteriuria  - seems asymptomatic.  Francis Dunn

## 2019-12-06 NOTE — CDS QUERY
Altered Level of Consciousness/Decreased Level of Consciousness without History of Injury  CLINICAL DOCUMENTATION CLARIFICATION FORM  Clinical information (provided below) indicates altered level of consciousness and/or decreased level of consciousness wit

## 2019-12-06 NOTE — PLAN OF CARE
Assumed care @ 0700. Pt a/o x0, Pt more verbal per [address] Telma Gonzalez RN. Pt able to answer more than yes/no, but able to answer a/o question. VSS, tele SR. No acute respiratory distress noted. Denies any pain. Pt bedrest at baseline. (-) BM.   Urine cx result no interventions unsuccessful or patient reports new pain  - Anticipate increased pain with activity and pre-medicate as appropriate  Outcome: Progressing     Problem: SAFETY ADULT - FALL  Goal: Free from fall injury  Description  INTERVENTIONS:  - Assess pt oxygenation  Description  INTERVENTIONS:  - Assess for changes in respiratory status  - Assess for changes in mentation and behavior  - Position to facilitate oxygenation and minimize respiratory effort  - Oxygen supplementation based on oxygen saturation Progressing  Goal: Electrolytes maintained within normal limits  Description  INTERVENTIONS:  - Monitor labs and rhythm and assess patient for signs and symptoms of electrolyte imbalances  - Administer electrolyte replacement as ordered  - Monitor response status  Description  INTERVENTIONS  - Assess for and report changes in neurological status  - Initiate measures to prevent increased intracranial pressure  - Maintain blood pressure and fluid volume within ordered parameters to optimize cerebral perfusion Provide support under elbow of weak side to prevent shoulder subluxation  Outcome: Progressing     Problem: Impaired Swallowing  Goal: Minimize aspiration risk  Description  Interventions:  - Patient should be alert and upright for all feedings (90 degrees

## 2019-12-06 NOTE — PROGRESS NOTES
[facility]  Nephrology Progress Note    Hannah Marrow Patient Status:  Inpatient    10/30/1955 MRN [id number]   [facility] 2NE-A Attending Miko Saul MD   Hosp Day # 3 PCP Siria Mejia MD       SUBJECTIVE:  More alert tod 125* 127* 124* 121*   CO2 15.0* 16.0* 16.0* 16.0*   BUN 75* 83* 80* 71*   CREATSERUM 5.31* 5.40* 5.14* 4.92*   CA 8.2* 7.7* 7.7* 7.7*   * 155* 172* 195*       Recent Labs   Lab 12/03/19  1616 12/05/19  0618   ALT 12* <6*   AST 8* 4*   ALB 2.2* 2.0* Daily  folic acid (FOLVITE) tab 1 mg, 1 mg, Oral, Daily  Heparin Sodium (Porcine) 5000 UNIT/ML injection 5,000 Units, 5,000 Units, Subcutaneous, 2 times per day  HYDROcodone-acetaminophen (NORCO) 5-325 MG per tab 1 tablet, 1 tablet, Oral, Q6H PRN  Cheryl

## 2019-12-06 NOTE — PLAN OF CARE
Assumed care of pt at [address]. Pt nonverbal- pt can say yes/no/ok at times. Pt does open eyes and looks at staff. On RA with SpO2 at 96%, lungs clear  NSR per tele. Pt denies chest pain, SOB, palpitations, and pain. VSS  Incontinent of bowel and bladder. interventions unsuccessful or patient reports new pain  - Anticipate increased pain with activity and pre-medicate as appropriate  Outcome: Progressing     Problem: SAFETY ADULT - FALL  Goal: Free from fall injury  Description  INTERVENTIONS:  - Assess pt oxygenation  Description  INTERVENTIONS:  - Assess for changes in respiratory status  - Assess for changes in mentation and behavior  - Position to facilitate oxygenation and minimize respiratory effort  - Oxygen supplementation based on oxygen saturation Progressing  Goal: Electrolytes maintained within normal limits  Description  INTERVENTIONS:  - Monitor labs and rhythm and assess patient for signs and symptoms of electrolyte imbalances  - Administer electrolyte replacement as ordered  - Monitor response status  Description  INTERVENTIONS  - Assess for and report changes in neurological status  - Initiate measures to prevent increased intracranial pressure  - Maintain blood pressure and fluid volume within ordered parameters to optimize cerebral perfusion level and precautions during self-care  - Provide support under elbow of weak side to prevent shoulder subluxation  Outcome: Progressing     Problem: Impaired Swallowing  Goal: Minimize aspiration risk  Description  Interventions:  - Patient should be aler

## 2019-12-06 NOTE — PALLIATIVE CARE NOTE
I attempt to reach at both numbers no answer, I left a message for Bennetta Cabot the office number to follow up on plan. mobile: [phone] [facility]:  [phone],    I spoke to [facility] and they are meeting this afternoon with Jay Reyez.

## 2019-12-06 NOTE — SLP NOTE
SPEECH DAILY NOTE - INPATIENT    ASSESSMENT & PLAN   ASSESSMENT  Pt seen for dysphagia tx to assess tolerance with recommended diet, ensure proper utilization of aspiration precautions and provide pt/family education. Patient alert and up in bed.  She is a Progress   Goal #4 The patient will utilize compensatory strategies as outlined by  BSSE (clinical evaluation) including Slow rate, Small bites, Small sips, No straws, Feed patient with max assistance 100 % of the time across 2 sessions.     In Progress

## 2019-12-06 NOTE — CM/SW NOTE
SW met with pt. Called and spoke with guardian Leticia Pringle. He stated that he will sign consents as long as we can take her under a full code because that has always been her wishes. Consents sent to guardians office.  If not received before 5 pm, we jamey

## 2019-12-06 NOTE — CDS QUERY
Clarification for Documentation Specificity  CLINICAL DOCUMENTATION CLARIFICATION FORM  Clinical information (provided below) suggests an associated diagnosis that is not included in the patient's medical record.  For accurate ICD-10-CM code assignment to r

## 2019-12-06 NOTE — CM/SW NOTE
Referral made to [facility]. [facility] will reach out to guardian.     Belinda Kelley LCSW  /Discharge Planner  [phone]

## 2019-12-07 LAB
ANION GAP SERPL CALC-SCNC: 6 MMOL/L (ref 0–18)
BUN BLD-MCNC: 70 MG/DL (ref 7–18)
BUN/CREAT SERPL: 14.2 (ref 10–20)
CALCIUM BLD-MCNC: 7.6 MG/DL (ref 8.5–10.1)
CHLORIDE SERPL-SCNC: 117 MMOL/L (ref 98–112)
CO2 SERPL-SCNC: 16 MMOL/L (ref 21–32)
CREAT BLD-MCNC: 4.93 MG/DL (ref 0.55–1.02)
GLUCOSE BLD-MCNC: 205 MG/DL (ref 70–99)
GLUCOSE BLD-MCNC: 209 MG/DL (ref 70–99)
GLUCOSE BLD-MCNC: 211 MG/DL (ref 70–99)
GLUCOSE BLD-MCNC: 225 MG/DL (ref 70–99)
GLUCOSE BLD-MCNC: 238 MG/DL (ref 70–99)
OSMOLALITY SERPL CALC.SUM OF ELEC: 315 MOSM/KG (ref 275–295)
POTASSIUM SERPL-SCNC: 4 MMOL/L (ref 3.5–5.1)
SODIUM SERPL-SCNC: 139 MMOL/L (ref 136–145)

## 2019-12-07 PROCEDURE — 99232 SBSQ HOSP IP/OBS MODERATE 35: CPT | Performed by: INTERNAL MEDICINE

## 2019-12-07 RX ORDER — BUMETANIDE 0.25 MG/ML
2 INJECTION, SOLUTION INTRAMUSCULAR; INTRAVENOUS
Status: DISCONTINUED | OUTPATIENT
Start: 2019-12-07 | End: 2019-12-08

## 2019-12-07 NOTE — PLAN OF CARE
Resumed care at 2300 , patient alert to name and drowsy and sleepy, lungs sounds clear on room air , scds on , denies any pain / distress , not voided , sinus on tele monitor, will monitor urination  , made comfortable, poc and all care explained, no acute increased pain with activity and pre-medicate as appropriate  Outcome: Progressing     Problem: SAFETY ADULT - FALL  Goal: Free from fall injury  Description  INTERVENTIONS:  - Assess pt frequently for physical needs  - Identify cognitive and physical defi status  - Assess for changes in mentation and behavior  - Position to facilitate oxygenation and minimize respiratory effort  - Oxygen supplementation based on oxygen saturation or ABGs  - Provide Smoking Cessation handout, if applicable  - Encourage bron retention  Description  INTERVENTIONS:  - Assess patient’s ability to void and empty bladder  - Monitor intake/output and perform bladder scan as needed  - Follow urinary retention protocol/standard of care  - Consider collaborating with pharmacy to review

## 2019-12-07 NOTE — PROGRESS NOTES
[facility]  Progress Note    Marcin Mendoza Patient Status:  Inpatient    10/30/1955 MRN [id number]   [facility] 2NE-A Attending Rochelle Murphy MD   Hosp Day # 3 PCP Tatianna Sherwood MD     CC: Dysphagia, lower extremity swelling ed 12/06/2019    BUN 71 12/06/2019     12/06/2019    K 4.0 12/06/2019     12/06/2019    CO2 16.0 12/06/2019     12/06/2019    CA 7.7 12/06/2019    PGLU 276 12/06/2019       Imaging:  CT BRAIN OR HEAD ([id number])     COMPARISON:  [facility] , CT, CT HS  acetaminophen (TYLENOL) tab 650 mg, 650 mg, Oral, Q6H PRN  ondansetron HCl (ZOFRAN) injection 4 mg, 4 mg, Intravenous, Q6H PRN  Metoclopramide HCl (REGLAN) injection 5 mg, 5 mg, Intravenous, Q8H PRN  amLODIPine Besylate (NORVASC) tab 5 mg, 5 mg, Oral, chronic kidney disease stage V  1. Nephrology on consult  2. Progressive kidney failure due to diabetes mellitus and hypertension  3. IV diuretics per nephrology  2. Anemia of chronic kidney disease  1. Follow CBC  2. Procrit per nephrology   3.  Iron studi

## 2019-12-07 NOTE — PROGRESS NOTES
[facility]  Nephrology Progress Note    Madison Kohli Patient Status:  Inpatient    10/30/1955 MRN [id number]   [facility] 2NE-A Attending Tim Patiño MD   Hosp Day # 4 PCP Rosanna Barfield MD       SUBJECTIVE:  Somewhat more 3.5 3.4* 4.0 4.0   * 127* 124* 121* 117*   CO2 15.0* 16.0* 16.0* 16.0* 16.0*   BUN 75* 83* 80* 71* 70*   CREATSERUM 5.31* 5.40* 5.14* 4.92* 4.93*   CA 8.2* 7.7* 7.7* 7.7* 7.6*   * 155* 172* 195* 211*       Recent Labs   Lab 12/03/19  1614 12/0 Inhalation, Daily  folic acid (FOLVITE) tab 1 mg, 1 mg, Oral, Daily  Heparin Sodium (Porcine) 5000 UNIT/ML injection 5,000 Units, 5,000 Units, Subcutaneous, 2 times per day  HYDROcodone-acetaminophen (NORCO) 5-325 MG per tab 1 tablet, 1 tablet, Oral, Q6H P

## 2019-12-07 NOTE — PLAN OF CARE
Pt and VS remained stable this shift. Denies pain sats maintained in 90's on RA. PCT reported approx 800 cc via bladder scan. Reassessed by this RN noted to have 226 -300 cc per bladder scanner. Brief appear slightly soiled with urine.  Abdomen soft non ten

## 2019-12-07 NOTE — PLAN OF CARE
Received bedside report on this Pt., at 1540. Pt., awake, drowsy at times, is more alert, answering yes/no questions, and once in a while says other words. Pt., is in SR on Tele monitor, sats greater than 92% on RA, denies any pain or distress.  Pt., contin

## 2019-12-07 NOTE — HOSPICE RN NOTE
[facility] will follow up on Monday 12/9/19 to see if patient is ready to go back to [place] [facility]. Guardian will be signing the paperwork. POC was discussed with Simba TREJO

## 2019-12-08 ENCOUNTER — APPOINTMENT (OUTPATIENT)
Dept: ULTRASOUND IMAGING | Facility: HOSPITAL | Age: 64
DRG: 683 | End: 2019-12-08
Attending: INTERNAL MEDICINE
Payer: MEDICARE

## 2019-12-08 LAB
ANION GAP SERPL CALC-SCNC: 8 MMOL/L (ref 0–18)
BUN BLD-MCNC: 66 MG/DL (ref 7–18)
BUN/CREAT SERPL: 13.7 (ref 10–20)
CALCIUM BLD-MCNC: 7.8 MG/DL (ref 8.5–10.1)
CHLORIDE SERPL-SCNC: 116 MMOL/L (ref 98–112)
CO2 SERPL-SCNC: 16 MMOL/L (ref 21–32)
CREAT BLD-MCNC: 4.81 MG/DL (ref 0.55–1.02)
GLUCOSE BLD-MCNC: 169 MG/DL (ref 70–99)
GLUCOSE BLD-MCNC: 180 MG/DL (ref 70–99)
GLUCOSE BLD-MCNC: 194 MG/DL (ref 70–99)
GLUCOSE BLD-MCNC: 255 MG/DL (ref 70–99)
OSMOLALITY SERPL CALC.SUM OF ELEC: 313 MOSM/KG (ref 275–295)
POTASSIUM SERPL-SCNC: 4.3 MMOL/L (ref 3.5–5.1)
SODIUM SERPL-SCNC: 140 MMOL/L (ref 136–145)

## 2019-12-08 PROCEDURE — 99232 SBSQ HOSP IP/OBS MODERATE 35: CPT | Performed by: INTERNAL MEDICINE

## 2019-12-08 PROCEDURE — 76857 US EXAM PELVIC LIMITED: CPT | Performed by: INTERNAL MEDICINE

## 2019-12-08 RX ORDER — TORSEMIDE 20 MG/1
40 TABLET ORAL DAILY
Status: DISCONTINUED | OUTPATIENT
Start: 2019-12-09 | End: 2019-12-10

## 2019-12-08 NOTE — PROGRESS NOTES
[facility]  Progress Note    Serina Edouard Patient Status:  Inpatient    10/30/1955 MRN [id number]   [facility] 2NE-A Attending Bernadine Sparks MD   Hosp Day # 4 PCP Myra Lawton MD     CC: Dysphagia, lower extremity swelling ed CREATSERUM 4.93 12/07/2019    BUN 70 12/07/2019     12/07/2019    K 4.0 12/07/2019     12/07/2019    CO2 16.0 12/07/2019     12/07/2019    CA 7.6 12/07/2019    PGLU 238 12/07/2019       Imaging:  CT BRAIN OR HEAD ([id number])     COMPARISO and HS  acetaminophen (TYLENOL) tab 650 mg, 650 mg, Oral, Q6H PRN  ondansetron HCl (ZOFRAN) injection 4 mg, 4 mg, Intravenous, Q6H PRN  Metoclopramide HCl (REGLAN) injection 5 mg, 5 mg, Intravenous, Q8H PRN  amLODIPine Besylate (NORVASC) tab 5 mg, 5 mg, Or consult  2. Progressive kidney failure due to diabetes mellitus and hypertension  3. IV diuretics per nephrology  2. Anemia of chronic kidney disease  1. Follow CBC  2. Procrit per nephrology   3. Iron studies  3. Diabetes mellitus type 2 with CKD  1.  Hype

## 2019-12-08 NOTE — CONSULTS
[facility] Urology Consultation    Anuj Gonzalez Patient Status:  Inpatient    10/30/1955 MRN [id number]   [facility] 2NE-A Attending Ashley Lechuga MD   Hosp Day # 5 PCP Daphne Allen MD     Reason for Consultation:  [facility] [place] Facility-Administered Medications:   •  [START ON 12/9/2019] torsemide (DEMADEX) tab 40 mg, 40 mg, Oral, Daily  •  sodium bicarbonate tab 1,300 mg, 1,300 mg, Oral, BID  •  Miconazole Nitrate 2 % powder, , Topical, [email or web address]  •  glucose (DEX4) oral liqu traZODone HCl (DESYREL) tab 50 mg, 50 mg, Oral, Nightly  •  risperiDONE (RISPERDAL) tab 0.5 mg, 0.5 mg, Oral, BID  •  Labetalol HCl (NORMODYNE) tab 200 mg, 200 mg, Oral, BID  •  ipratropium-albuterol (DUONEB) nebulizer solution 3 mL, 3 mL, Nebulization, Q6 Sepsis ([facility] [address]     Diabetes ([facility]     CKD (chronic kidney disease) stage 3, GFR 30-59 ml/min ([facility])     AHSAN (acute kidney injury) ([facility]     History of seizure     Cognitive impairment     Acute delirium     Severe episode of recurrent major depressive disorde acinetobacter on UCX 12/3/19  -Tx'd with ceftriaxone    PLAN    1. Would not recommend greene unless patient seems uncomfortable or no urine output. 2.  Will follow [facility]. Please call with any questions. Discussed with RN.     Thank you for all

## 2019-12-08 NOTE — PLAN OF CARE
Assumed care of patient at [address]. Alert and oriented x1 to 2--more alert and talkative at times, No C/O chest pain or SOB, SPO2 on RA 97%, Heart rate SB/SR upper 50s-60s. Breath sounds diminished. Bed is locked and in low position.  Personal belonging within scale  - Administer analgesics based on type and severity of pain and evaluate response  - Implement non-pharmacological measures as appropriate and evaluate response  - Consider cultural and social influences on pain and pain management  - Manage/alleviat vital signs, obtain 12 lead EKG if indicated  - Evaluate effectiveness of antiarrhythmic and heart rate control medications as ordered  - Initiate emergency measures for life threatening arrhythmias  - Monitor electrolytes and administer replacement therap range  Description  INTERVENTIONS:  - Monitor Blood Glucose as ordered  - Assess for signs and symptoms of hyperglycemia and hypoglycemia  - Administer ordered medications to maintain glucose within target range  - Assess barriers to adequate nutritional i patient handling equipment as needed  - Ensure adequate protection for wounds/incisions during mobilization  - Obtain PT/OT consults as needed  - Advance activity as appropriate  - Communicate ordered activity level and limitations with patient/family  Out level of independence in self care  Description  Interventions:  - Assess ability and encourage patient to participate in ADLs to maximize function  - Promote sitting position while performing ADLs such as feeding, grooming, and bathing  - Educate and enco

## 2019-12-08 NOTE — PROGRESS NOTES
[facility]  Nephrology Progress Note    Joy Cutler Patient Status:  Inpatient    10/30/1955 MRN [id number]   [facility] 2NE-A Attending Meagan Salazar MD   Hosp Day # 5 PCP Lennox Ahr, MD       SUBJECTIVE:  No acute event * 149* 144 139 140   K 3.5 3.4* 4.0 4.0 4.3   * 124* 121* 117* 116*   CO2 16.0* 16.0* 16.0* 16.0* 16.0*   BUN 83* 80* 71* 70* 66*   CREATSERUM 5.40* 5.14* 4.92* 4.93* 4.81*   CA 7.7* 7.7* 7.7* 7.6* 7.8*   * 172* 195* 211* 169*       Re MCG/INH inhaler 1 puff, 1 puff, Inhalation, Daily  folic acid (FOLVITE) tab 1 mg, 1 mg, Oral, Daily  Heparin Sodium (Porcine) 5000 UNIT/ML injection 5,000 Units, 5,000 Units, Subcutaneous, 2 times per day  HYDROcodone-acetaminophen (NORCO) 5-325 MG per tab

## 2019-12-08 NOTE — PROGRESS NOTES
[facility]  Progress Note    Kat Fischer Patient Status:  Inpatient    10/30/1955 MRN [id number]   [facility] 2NE-A Attending Nicole Todd MD   Hosp Day # 5 PCP Adair Mendenhall MD     CC: Dysphagia, lower extremity swelling ed Date    CREATSERUM 4.81 12/08/2019    BUN 66 12/08/2019     12/08/2019    K 4.3 12/08/2019     12/08/2019    CO2 16.0 12/08/2019     12/08/2019    CA 7.8 12/08/2019    PGLU 180 12/08/2019       Imaging:  CT BRAIN OR HEAD ([id number])     COMP TID AC and HS  acetaminophen (TYLENOL) tab 650 mg, 650 mg, Oral, Q6H PRN  ondansetron HCl (ZOFRAN) injection 4 mg, 4 mg, Intravenous, Q6H PRN  Metoclopramide HCl (REGLAN) injection 5 mg, 5 mg, Intravenous, Q8H PRN  amLODIPine Besylate (NORVASC) tab 5 mg, 5 V  1. Nephrology on consult  2. Progressive kidney failure due to diabetes mellitus and hypertension  3. IV diuretics per nephrology  2. Anemia of chronic kidney disease  1. Follow CBC  2. Procrit per nephrology   3. Iron studies  3.  Diabetes mellitus type

## 2019-12-09 LAB
GLUCOSE BLD-MCNC: 199 MG/DL (ref 70–99)
GLUCOSE BLD-MCNC: 224 MG/DL (ref 70–99)
GLUCOSE BLD-MCNC: 261 MG/DL (ref 70–99)
GLUCOSE BLD-MCNC: 275 MG/DL (ref 70–99)

## 2019-12-09 PROCEDURE — 99233 SBSQ HOSP IP/OBS HIGH 50: CPT | Performed by: INTERNAL MEDICINE

## 2019-12-09 RX ORDER — LEVETIRACETAM 500 MG/1
500 TABLET ORAL 2 TIMES DAILY
Status: DISCONTINUED | OUTPATIENT
Start: 2019-12-09 | End: 2019-12-10

## 2019-12-09 NOTE — PLAN OF CARE
Pt received at 0730 resting in bed. Responds to name but not saying any words. At times will shake head yes or no, follows some commands but not all. Consistent moaning throughout the shift.  Pt ate chopped diet 100% of breakfast with 1:1 feeding assistance Administer analgesics based on type and severity of pain and evaluate response  - Implement non-pharmacological measures as appropriate and evaluate response  - Consider cultural and social influences on pain and pain management  - Manage/alleviate anxiety signs, obtain 12 lead EKG if indicated  - Evaluate effectiveness of antiarrhythmic and heart rate control medications as ordered  - Initiate emergency measures for life threatening arrhythmias  - Monitor electrolytes and administer replacement therapy as o range  Description  INTERVENTIONS:  - Monitor Blood Glucose as ordered  - Assess for signs and symptoms of hyperglycemia and hypoglycemia  - Administer ordered medications to maintain glucose within target range  - Assess barriers to adequate nutritional i patient handling equipment as needed  - Ensure adequate protection for wounds/incisions during mobilization  - Obtain PT/OT consults as needed  - Advance activity as appropriate  - Communicate ordered activity level and limitations with patient/family  Out in self care  Description  Interventions:  - Assess ability and encourage patient to participate in ADLs to maximize function  - Promote sitting position while performing ADLs such as feeding, grooming, and bathing  - Educate and encourage patient/family i

## 2019-12-09 NOTE — CM/SW NOTE
Consents received, equipment scheduled for delivery tonight and will be able to discharge in the morning. Details will be provided in the morning.

## 2019-12-09 NOTE — PROGRESS NOTES
[facility]  Progress Note    Joy Cutler Patient Status:  Inpatient    10/30/1955 MRN [id number]   [facility] 2NE-A Attending Stanley Fleischer, MD   Hosp Day # 6 PCP Lennox Ahr, MD     CC: Dysphagia, lower extremity swelling ed PGLU 199 12/09/2019       Imaging:  CT BRAIN OR HEAD ([id number])     COMPARISON:  [facility] , CT, CT BRAIN OR HEAD ([id number]), 10/29/2019, 10:56.      INDICATIONS:  new onset dysphagia, rule out cva     TECHNIQUE:  Noncontrast CT scanning is performed through the bra PRN  amLODIPine Besylate (NORVASC) tab 5 mg, 5 mg, Oral, Daily  baclofen (LIORESAL) tab 20 mg, 20 mg, Oral, TID  bisacodyl (DULCOLAX) rectal suppository 10 mg, 10 mg, Rectal, Daily PRN  calcium acetate (PHOSLO) cap 667 mg, 1 capsule, Oral, BID  cholestyram studies  3. Diabetes mellitus type 2 with CKD  1. Hyperglycemia protocol  2. Follow Accu-Cheks  4. Seizure disorder  1. IV Keppra while n.p.o.  5. Essential hypertension  1.  Continue home medications including labetalol and Norvasc when able to take oral

## 2019-12-09 NOTE — SLP NOTE
SPEECH DAILY NOTE - INPATIENT    ASSESSMENT & PLAN   ASSESSMENT  Pt seen for dysphagia tx to assess tolerance with recommended diet, ensure proper utilization of aspiration precautions and provide pt/family education.   Patient alert and up in bed, moaning Small bites, Small sips, No straws, Feed patient with max assistance 100 % of the time across 2 sessions.     In Progress     FOLLOW UP  Follow Up Needed: Yes  SLP Follow-up Date: 12/10/19  Number of Visits to Meet Established Goals: 4    Session: 2 of 4

## 2019-12-09 NOTE — CM/SW NOTE
BRAYDON spoke to Yana from [facility]. Guardian sent back consents for hospice late on Friday but dated it wrong, New consents sent back to the guardian-await return of signed consents.   [facility] ok with pt going to [address]- they can final

## 2019-12-09 NOTE — CM/SW NOTE
Still awaiting consents to be returned . Called and spoke with Robin. Verifying equipment to be ordered.

## 2019-12-09 NOTE — PROGRESS NOTES
[facility] Pharmacy Note: Route Optimization for Levetiracetam (KEPPRA)    Patient is currently on Levetiracetam (KEPPRA) 500 mg IV every 12 hours.    The patient meets the criteria to convert to the oral equivalent as established by the IV to Oral conversion prot

## 2019-12-09 NOTE — PROGRESS NOTES
[facility]  Nephrology Progress Note    Karen Pro Patient Status:  Inpatient    10/30/1955 MRN [id number]   [facility] 2NE-A Attending Koffi Haque MD   Hosp Day # 6 PCP Bhumika Alcala MD       SUBJECTIVE:  Somewhat more per tab 1 tablet, 1 tablet, Oral, Q6H PRN  Umeclidinium Bromide (INCRUSE ELLIPTA) 62.5 MCG/INH inhaler 1 puff, 1 puff, Inhalation, Daily  traZODone HCl (DESYREL) tab 50 mg, 50 mg, Oral, Nightly  risperiDONE (RISPERDAL) tab 0.5 mg, 0.5 mg, Oral, BID  Labeta BAND  --  1       Recent Labs   Lab 12/04/19  0620 12/05/19  0618 12/06/19  0649 12/07/19  0654 12/08/19  0557   * 149* 144 139 140   K 3.5 3.4* 4.0 4.0 4.3   * 124* 121* 117* 116*   CO2 16.0* 16.0* 16.0* 16.0* 16.0*   BUN 83* 80* 71* 70* 66*

## 2019-12-09 NOTE — PROGRESS NOTES
INFECTIOUS DISEASE PROGRESS NOTE    Evon Ho Patient Status:  Inpatient    10/30/1955 MRN [id number]   [facility] 2NE-A Attending Nidia Price MD   Hosp Day # 6 PCP Denys Haider puff, 1 puff, Inhalation, Daily  •  folic acid (FOLVITE) tab 1 mg, 1 mg, Oral, Daily  •  Heparin Sodium (Porcine) 5000 UNIT/ML injection 5,000 Units, 5,000 Units, Subcutaneous, 2 times per day  •  HYDROcodone-acetaminophen (NORCO) 5-325 MG per tab 1 tablet HGB 7.7* 6.7*   HCT 24.6* 21.4*   MCV 95.3 94.3   MCH 29.8 29.5   MCHC 31.3 31.3   RDW 15.3* 15.1*   NEPRELIM 7.12 5.59   WBC 9.9 8.8   .0 231.0   NEUT 78 78   LYMPH 13 10   MON 4 2   EOS 3 8     Recent Labs   Lab 12/03/19  1616  12/05/19  0618 12

## 2019-12-09 NOTE — PLAN OF CARE
Assumed care of pt around 1930. Pt Aox1. Able to answer questions at times and make needs known. Pt also moaning at times, but denies pain. Fall precautions maintained. Seizure precautions maintained. NSR on tele.  Sacral  And L thigh dressing changed this pain with activity and pre-medicate as appropriate  Outcome: Progressing     Problem: SAFETY ADULT - FALL  Goal: Free from fall injury  Description  INTERVENTIONS:  - Assess pt frequently for physical needs  - Identify cognitive and physical deficits and b Assess for changes in mentation and behavior  - Position to facilitate oxygenation and minimize respiratory effort  - Oxygen supplementation based on oxygen saturation or ABGs  - Provide Smoking Cessation handout, if applicable  - Encourage broncho-pulmona limits  Description  INTERVENTIONS:  - Monitor labs and rhythm and assess patient for signs and symptoms of electrolyte imbalances  - Administer electrolyte replacement as ordered  - Monitor response to electrolyte replacements, including rhythm and repeat changes in neurological status  - Initiate measures to prevent increased intracranial pressure  - Maintain blood pressure and fluid volume within ordered parameters to optimize cerebral perfusion and minimize risk of hemorrhage  - Monitor temperature, gluc elbow of weak side to prevent shoulder subluxation  Outcome: Progressing     Problem: Impaired Swallowing  Goal: Minimize aspiration risk  Description  Interventions:  - Patient should be alert and upright for all feedings (90 degrees preferred)  - Offer f

## 2019-12-10 VITALS
WEIGHT: 204.56 LBS | OXYGEN SATURATION: 96 % | BODY MASS INDEX: 34.08 KG/M2 | DIASTOLIC BLOOD PRESSURE: 37 MMHG | HEIGHT: 65 IN | SYSTOLIC BLOOD PRESSURE: 110 MMHG | RESPIRATION RATE: 20 BRPM | HEART RATE: 66 BPM | TEMPERATURE: 98 F

## 2019-12-10 LAB
ANION GAP SERPL CALC-SCNC: 9 MMOL/L (ref 0–18)
BUN BLD-MCNC: 71 MG/DL (ref 7–18)
BUN/CREAT SERPL: 14.7 (ref 10–20)
CALCIUM BLD-MCNC: 8 MG/DL (ref 8.5–10.1)
CHLORIDE SERPL-SCNC: 115 MMOL/L (ref 98–112)
CO2 SERPL-SCNC: 17 MMOL/L (ref 21–32)
CREAT BLD-MCNC: 4.82 MG/DL (ref 0.55–1.02)
GLUCOSE BLD-MCNC: 212 MG/DL (ref 70–99)
GLUCOSE BLD-MCNC: 221 MG/DL (ref 70–99)
GLUCOSE BLD-MCNC: 225 MG/DL (ref 70–99)
HAV IGM SER QL: 1.8 MG/DL (ref 1.6–2.6)
OSMOLALITY SERPL CALC.SUM OF ELEC: 319 MOSM/KG (ref 275–295)
POTASSIUM SERPL-SCNC: 4.8 MMOL/L (ref 3.5–5.1)
SODIUM SERPL-SCNC: 141 MMOL/L (ref 136–145)

## 2019-12-10 PROCEDURE — 99232 SBSQ HOSP IP/OBS MODERATE 35: CPT | Performed by: INTERNAL MEDICINE

## 2019-12-10 RX ORDER — TORSEMIDE 20 MG/1
40 TABLET ORAL DAILY
Qty: 60 TABLET | Refills: 2 | Status: SHIPPED | OUTPATIENT
Start: 2019-12-11

## 2019-12-10 NOTE — PROGRESS NOTES
[facility]  Progress Note    Sky Monahan Patient Status:  Inpatient    10/30/1955 MRN [id number]   [facility] 2NE-A Attending Teo Ng MD   Hosp Day # 7 PCP Charbel Pelaez MD   pt seen and examined earlier.  Awake and seem brain. Dose reduction techniques were used. Dose information is transmitted to the [facility] [employer] [facility] Radiology) NRDR ([address]) which includes the Dose Index   Registry.      PATIENT STATED HISTORY: (As transcribed by Sally Lerma (PHOSLO) cap 667 mg, 1 capsule, Oral, BID  cholestyramine light (PREVALITE) powder packet 4 g, 4 g, Oral, Daily  escitalopram (LEXAPRO) tablet 10 mg, 10 mg, Oral, Daily  docusate sodium (COLACE) cap 100 mg, 100 mg, Oral, BID  ferrous sulfate EC tab 325 mg, Full range of motion of all extremities. 1+ edema legs    ASSESSMENT / PLAN:     1. Bilateral pedal edema, acute kidney injury on chronic kidney disease stage V  1. Nephrology on consult  2.  Progressive kidney failure due to diabetes mellitus and hyperten

## 2019-12-10 NOTE — SLP NOTE
SPEECH DAILY NOTE - INPATIENT    ASSESSMENT & PLAN   ASSESSMENT  Pt seen for dysphagia tx to assess tolerance with recommended diet, ensure proper utilization of aspiration precautions and provide pt/family education.   Patient alert and up in bed eating br evaluation) including Slow rate, Small bites, Small sips, No straws, Feed patient with max assistance 100 % of the time across 2 sessions.     Met     FOLLOW UP  Follow Up Needed: No  SLP Follow-up Date: 12/10/19  Number of Visits to Meet Established Goals

## 2019-12-10 NOTE — HOSPICE RN NOTE
[facility] will be picking patient up at 1130am for transport to [facility] in [facility].

## 2019-12-10 NOTE — PLAN OF CARE
Received pt from previous RN- pt able to open eyes to verbal stimuli- no tracking noted- moans occasionally. Vital signs stable. Tele NSR  Lung sound diminished- sat room air 93%. Pt repositioned- stg 2 to sacral area dressing changed- hx MRDO - urine.  Iso prescribed range  Description  INTERVENTIONS:  - Monitor Blood Glucose as ordered  - Assess for signs and symptoms of hyperglycemia and hypoglycemia  - Administer ordered medications to maintain glucose within target range  - Assess barriers to adequate nu medications as ordered  - Monitor neurological status  Outcome: Progressing     Problem: Impaired Swallowing  Goal: Minimize aspiration risk  Description  Interventions:  - Patient should be alert and upright for all feedings (90 degrees preferred)  - Offe

## 2019-12-10 NOTE — PROGRESS NOTES
For transfer to [address] [place] [address]. Report given to [facility]. [facility] aware of the transfer. DC picc line right upper arm with cannula intact. Transfer via [facility] ambulance.

## 2019-12-10 NOTE — DISCHARGE SUMMARY
[facility]  Discharge Summary    Serina Edouard Patient Status:  Inpatient    10/30/1955 MRN [id number]   [facility] 2NE-A Attending Raiza Castro MD   Hosp Day # 7 PCP Myra Lawton MD     Date of Admission: 12/3/2019    Date disease ([facility] [address]     CKD (chronic kidney disease) stage 4, GFR 15-29 ml/min (HCC)     Acute cystitis without hematuria     CKD (chronic kidney disease) stage 5, GFR less than 15 ml/min [facility])     Palliative care encounter     Counseling regarding advance care (68958)    Result Date: 12/4/2019  PROCEDURE:  CT BRAIN OR HEAD (91356)  COMPARISON:  YESY , CT, CT BRAIN OR HEAD (67081), 10/29/2019, 10:56.   INDICATIONS:  new onset dysphagia, rule out cva  TECHNIQUE:  Noncontrast CT scanning is performed through the b (CPT=93970)  COMPARISON:  None. INDICATIONS:  bilat lower ext edema  TECHNIQUE:  Real time, grey scale, and duplex ultrasound was used to evaluate the lower extremity venous system.  B-mode two-dimensional images of the vascular structures, Doppler spectra below    Hospital Course: see below    pt seen and examined earlier. Awake and seemed comfortable. No fevers.  No new issues     OBJECTIVE:     Intake/Output:     Intake/Output Summary (Last 24 hours) at 12/10/2019 1107  Last data filed at 12/9/2019 2173 Radiology Data Registry) which includes the Dose Index   Registry.     PATIENT STATED HISTORY: (As transcribed by Technologist)  New obset dysphagia.  Rule out CVA.     FINDINGS:  No evidence of hemorrhage or extra-axial fluid collection.     Lucencies in t tablet 10 mg, 10 mg, Oral, Daily  docusate sodium (COLACE) cap 100 mg, 100 mg, Oral, BID  ferrous sulfate EC tab 325 mg, 325 mg, Oral, BID with meals  Fludrocortisone Acetate (FLORINEF) tab 0.1 mg, 0.1 mg, Oral, Daily  Fluticasone Furoate-Vilanterol (BREO chronic kidney disease stage V  1. Nephrology on consult  2. Progressive kidney failure due to diabetes mellitus and hypertension  3. IV diuretics per nephrology  2. Anemia of chronic kidney disease  1. Follow CBC  2. Procrit per nephrology   3.  Iron studi Powder, Breath Activated  Inhale 1 puff into the lungs daily. Cholestyramine 4 g Oral Powd Pack  Take 4 g by mouth. traZODone HCl 50 MG Oral Tab  Take 50 mg by mouth nightly.     insulin glargine 100 UNIT/ML Subcutaneous Solution  Inject into the skin Rectal Suppos  Place 10 mg rectally daily as needed. Labetalol HCl 200 MG Oral Tab  Take 200 mg by mouth 2 (two) times daily. diphenhydrAMINE HCl 25 MG Oral Tab  Take 25 mg by mouth every 6 (six) hours as needed for Itching.     triamcinolone aceton

## 2019-12-10 NOTE — HOSPICE RN NOTE
Ambulance set up for 1130. Forms printed and left with chart. Awaiting to hear from DON at [facility] to order meds. DME ordered and will be delivered today. Consents were faxed over to facility already. POC updated with Anil Ceron RN.     Addendum: Received a ulisses

## 2019-12-10 NOTE — PROGRESS NOTES
[facility]  Nephrology Progress Note    Joy Cutler Patient Status:  Inpatient    10/30/1955 MRN [id number]   [facility] 2NE-A Attending Meagan Salazar MD   Hosp Day # 7 PCP Lennox Ahr, MD       SUBJECTIVE:  No acute issue Subcutaneous, 2 times per day  HYDROcodone-acetaminophen (NORCO) 5-325 MG per tab 1 tablet, 1 tablet, Oral, Q6H PRN  Umeclidinium Bromide (INCRUSE ELLIPTA) 62.5 MCG/INH inhaler 1 puff, 1 puff, Inhalation, Daily  traZODone HCl (DESYREL) tab 50 mg, 50 mg, Or CA 7.8* 8.0*   MG  --  1.8       No results for input(s): ALT, AST, ALB, AMYLASE, LIPASE, LDH in the last 72 hours.     Invalid input(s): ALPHOS, TBIL, DBIL, TPROT       Impression/Plan:     1.  CKD V- progressive and due to DM/HTN. Velton Sink not consider st

## 2019-12-11 ENCOUNTER — APPOINTMENT (OUTPATIENT)
Dept: GENERAL RADIOLOGY | Facility: HOSPITAL | Age: 64
DRG: 177 | End: 2019-12-11
Attending: EMERGENCY MEDICINE
Payer: MEDICARE

## 2019-12-11 ENCOUNTER — HOSPITAL ENCOUNTER (INPATIENT)
Facility: HOSPITAL | Age: 64
LOS: 9 days | Discharge: HOSPICE | DRG: 177 | End: 2019-12-20
Attending: EMERGENCY MEDICINE | Admitting: INTERNAL MEDICINE
Payer: MEDICARE

## 2019-12-11 DIAGNOSIS — J18.9 NOSOCOMIAL PNEUMONIA: Primary | ICD-10-CM
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
## 2019-12-11 DIAGNOSIS — R41.82 ALTERED MENTAL STATUS, UNSPECIFIED ALTERED MENTAL STATUS TYPE: ICD-10-CM

## 2019-12-11 PROCEDURE — 99223 1ST HOSP IP/OBS HIGH 75: CPT | Performed by: NURSE PRACTITIONER

## 2019-12-11 PROCEDURE — 99222 1ST HOSP IP/OBS MODERATE 55: CPT | Performed by: INTERNAL MEDICINE

## 2019-12-11 PROCEDURE — 71045 X-RAY EXAM CHEST 1 VIEW: CPT | Performed by: EMERGENCY MEDICINE

## 2019-12-11 RX ORDER — HEPARIN SODIUM 5000 [USP'U]/ML
5000 INJECTION, SOLUTION INTRAVENOUS; SUBCUTANEOUS EVERY 12 HOURS
Status: DISCONTINUED | OUTPATIENT
Start: 2019-12-11 | End: 2019-12-20

## 2019-12-11 RX ORDER — CALCIUM ACETATE 667 MG/1
1 CAPSULE ORAL 2 TIMES DAILY
Status: DISCONTINUED | OUTPATIENT
Start: 2019-12-11 | End: 2019-12-20

## 2019-12-11 RX ORDER — TRAZODONE HYDROCHLORIDE 50 MG/1
50 TABLET ORAL NIGHTLY
Status: DISCONTINUED | OUTPATIENT
Start: 2019-12-11 | End: 2019-12-20

## 2019-12-11 RX ORDER — AMLODIPINE BESYLATE 5 MG/1
5 TABLET ORAL DAILY
Status: DISCONTINUED | OUTPATIENT
Start: 2019-12-11 | End: 2019-12-20

## 2019-12-11 RX ORDER — LEVETIRACETAM 500 MG/1
1000 TABLET ORAL DAILY
Status: DISCONTINUED | OUTPATIENT
Start: 2019-12-11 | End: 2019-12-11

## 2019-12-11 RX ORDER — BACLOFEN 10 MG/1
10 TABLET ORAL 3 TIMES DAILY
Status: DISCONTINUED | OUTPATIENT
Start: 2019-12-11 | End: 2019-12-20

## 2019-12-11 RX ORDER — SODIUM BICARBONATE 325 MG/1
650 TABLET ORAL 2 TIMES DAILY
Status: DISCONTINUED | OUTPATIENT
Start: 2019-12-11 | End: 2019-12-11

## 2019-12-11 RX ORDER — DIPHENHYDRAMINE HCL 25 MG
25 CAPSULE ORAL EVERY 6 HOURS PRN
Status: DISCONTINUED | OUTPATIENT
Start: 2019-12-11 | End: 2019-12-20

## 2019-12-11 RX ORDER — RISPERIDONE 0.5 MG/1
0.5 TABLET, FILM COATED ORAL 2 TIMES DAILY
Status: DISCONTINUED | OUTPATIENT
Start: 2019-12-11 | End: 2019-12-20

## 2019-12-11 RX ORDER — IPRATROPIUM BROMIDE AND ALBUTEROL SULFATE 2.5; .5 MG/3ML; MG/3ML
3 SOLUTION RESPIRATORY (INHALATION) EVERY 4 HOURS PRN
Status: DISCONTINUED | OUTPATIENT
Start: 2019-12-11 | End: 2019-12-20

## 2019-12-11 RX ORDER — DOXEPIN HYDROCHLORIDE 50 MG/1
1 CAPSULE ORAL DAILY
Status: DISCONTINUED | OUTPATIENT
Start: 2019-12-11 | End: 2019-12-20

## 2019-12-11 RX ORDER — HYDRALAZINE HYDROCHLORIDE 20 MG/ML
10 INJECTION INTRAMUSCULAR; INTRAVENOUS EVERY 6 HOURS PRN
Status: DISCONTINUED | OUTPATIENT
Start: 2019-12-11 | End: 2019-12-17

## 2019-12-11 RX ORDER — FOLIC ACID 1 MG/1
1 TABLET ORAL DAILY
Status: DISCONTINUED | OUTPATIENT
Start: 2019-12-11 | End: 2019-12-20

## 2019-12-11 RX ORDER — FLUDROCORTISONE ACETATE 0.1 MG/1
0.1 TABLET ORAL DAILY
Status: DISCONTINUED | OUTPATIENT
Start: 2019-12-11 | End: 2019-12-19

## 2019-12-11 RX ORDER — METOPROLOL TARTRATE 5 MG/5ML
5 INJECTION INTRAVENOUS EVERY 6 HOURS PRN
Status: DISCONTINUED | OUTPATIENT
Start: 2019-12-11 | End: 2019-12-20

## 2019-12-11 RX ORDER — LABETALOL 200 MG/1
200 TABLET, FILM COATED ORAL 2 TIMES DAILY
Status: DISCONTINUED | OUTPATIENT
Start: 2019-12-11 | End: 2019-12-20

## 2019-12-11 RX ORDER — ACETAMINOPHEN 325 MG/1
650 TABLET ORAL EVERY 6 HOURS PRN
Status: DISCONTINUED | OUTPATIENT
Start: 2019-12-11 | End: 2019-12-20

## 2019-12-11 RX ORDER — DIPHENHYDRAMINE HYDROCHLORIDE 50 MG/ML
25 INJECTION INTRAMUSCULAR; INTRAVENOUS EVERY 6 HOURS PRN
Status: DISCONTINUED | OUTPATIENT
Start: 2019-12-11 | End: 2019-12-20

## 2019-12-11 RX ORDER — BISACODYL 10 MG
10 SUPPOSITORY, RECTAL RECTAL DAILY PRN
Status: DISCONTINUED | OUTPATIENT
Start: 2019-12-11 | End: 2019-12-20

## 2019-12-11 RX ORDER — DOCUSATE SODIUM 100 MG/1
100 CAPSULE, LIQUID FILLED ORAL 2 TIMES DAILY
Status: DISCONTINUED | OUTPATIENT
Start: 2019-12-11 | End: 2019-12-20

## 2019-12-11 RX ORDER — METRONIDAZOLE 500 MG/100ML
500 INJECTION, SOLUTION INTRAVENOUS EVERY 8 HOURS
Status: DISCONTINUED | OUTPATIENT
Start: 2019-12-11 | End: 2019-12-17

## 2019-12-11 RX ORDER — SODIUM BICARBONATE 325 MG/1
1300 TABLET ORAL 2 TIMES DAILY
Status: DISCONTINUED | OUTPATIENT
Start: 2019-12-11 | End: 2019-12-15

## 2019-12-11 RX ORDER — TORSEMIDE 20 MG/1
40 TABLET ORAL DAILY
Status: DISCONTINUED | OUTPATIENT
Start: 2019-12-11 | End: 2019-12-16

## 2019-12-11 RX ORDER — CHOLESTYRAMINE LIGHT 4 G/5.7G
4 POWDER, FOR SUSPENSION ORAL DAILY
Status: DISCONTINUED | OUTPATIENT
Start: 2019-12-11 | End: 2019-12-20

## 2019-12-11 RX ORDER — MELATONIN
325 2 TIMES DAILY WITH MEALS
Status: DISCONTINUED | OUTPATIENT
Start: 2019-12-11 | End: 2019-12-20

## 2019-12-11 RX ORDER — ESCITALOPRAM OXALATE 10 MG/1
10 TABLET ORAL DAILY
Status: DISCONTINUED | OUTPATIENT
Start: 2019-12-11 | End: 2019-12-20

## 2019-12-11 RX ORDER — ONDANSETRON 2 MG/ML
4 INJECTION INTRAMUSCULAR; INTRAVENOUS EVERY 6 HOURS PRN
Status: DISCONTINUED | OUTPATIENT
Start: 2019-12-11 | End: 2019-12-20

## 2019-12-11 NOTE — PLAN OF CARE
Admission navigator completed by admission RN. Patient settled in bed, unresponsive  Report given to accepting RN. Dr. Ariane Ortiz ordered admission orders. Spoke with Renal Dr. Felicia Euceda and stated that she does not need any fluids at this time.

## 2019-12-11 NOTE — ED PROVIDER NOTES
Patient Seen in: [facility] Emergency Department      History   Patient presents with:  Altered Mental Status    Stated Complaint: AMS    HPI    Patient is a 29-year-old female with extensive medical history as below, sent from the nursing home for systems are as noted in HPI. Constitutional and vital signs reviewed. All other systems reviewed and negative except as noted above.     Physical Exam     ED Triage Vitals [12/11/19 0655]   /43   Pulse 73   Resp 11   Temp 99 °F (37.2 °C)   Temp Abnormal; Notable for the following components:    WBC 11.6 (*)     RBC 2.35 (*)     HGB 7.0 (*)     HCT 22.5 (*)     RDW 15.7 (*)     RDW-SD 54.3 (*)     Neutrophil Absolute Prelim 10.14 (*)     Neutrophil Absolute 10.14 (*)     Lymphocyte Absolute 0.53 ( are unremarkable. Visualized portions of mastoid air cells are unremarkable. Visualized portions of orbits are unremarkable. IMPRESSION: Unremarkable CT head.    Dictated by: Leesa Izquierdo MD on 12/04/2019 at 13:36     Approved by: Leesa Izquierdo exactly what her status is at this point. Admission disposition: 12/11/2019  9:27 AM           Update at 7:20 AM.  Apparently hospice nurse called prior to this patient arriving and stated that the patient is on hospice but is also full code.   Will contac

## 2019-12-11 NOTE — CONSULTS
[address] Patient Status:  Inpatient    10/30/1955 MRN [id number]   [facility] 5NW-A Attending Erin Bean MD   Hosp Day # 0 PCP Osmani Hdez MD     Date of Consult:  Candidiasis    • Cervical disc disease with myelopathy    • Chronic pain    • CKD (chronic kidney disease)    • Constipation    • Convulsions (HCC)    • COPD (chronic obstructive pulmonary disease) (HCC)    • Depression    • Diabetes (HCC)    • Encephalopa BID  •  calcium acetate (PHOSLO) cap 667 mg, 1 capsule, Oral, BID  •  multivitamin (ADULT) tab 1 tablet, 1 tablet, Oral, Daily  •  amLODIPine Besylate (NORVASC) tab 5 mg, 5 mg, Oral, Daily  •  baclofen (LIORESAL) tab 10 mg, 10 mg, Oral, TID  •  [employer] – Palliative Care Symptom Assessment     SUBJECTIVE: I visited and examined patient in bed without visitors at bedside.  Ja Burnham is moaning constantly, shaking her arms and holding them tightly against her chest. She is not verbalizing per her Brittaney Plate dried white crust from R eye. CARDIAC:  MARTHA - pt guarding chest and moaning. RESPIRATORY: MARTHA as above. No increased effort at rest.   EXTREMITIES: BLE pitting pedal edema present. NEUROLOGIC: MARTHA.  Non-verbal.  PSYCHIATRIC:  Moaning, agitated, non-parti pt's case with Debbie Stein, reviewing the specific details of her clinical course, progressive decline and concern regarding frequent hospital readmissions.  Viviana Mckeon expressed concern and wish to provide Flavia Whitt the highest level of comfort poss arrived to unit to discuss case with her PCP Dr. Kusum Shipley, [facility] Keith Ross and RN Tino Ramos, and the unit's Admission and Charge nurses.       Reviewed DNR with Dr. Kusum Shipley as Risks vs benefits of Full resuscitative att discussion with pt's guardian tomorrow. I provided the Palliative Care Brochure and my contact information.       Problem List:       Patient Active Problem List:     Anemia     Leukocytosis     Azotemia     Metabolic acidosis     Toxic metabolic encepha counseling/discussion     Candidiasis     Acute on chronic congestive heart failure, unspecified heart failure type [facility])     Lower extremity edema     Hypokalemia        Palliative Care Recommendations/Plan:         Goals of Care - Ongoing GOC discussions

## 2019-12-11 NOTE — ED NOTES
Called hospice to get full information/clarification regarding pt instructions and wishes. Dialed . Directed to [facility] office due to it being \"after hours. \" spoke with Blanca Stovall ().  Per Leonel the pt is to remain a \"full code\"

## 2019-12-11 NOTE — ED NOTES
Spoke with Charly regarding specifications of full code v. Hospice. Per Huber Etienne are able to do what's called \"gentle CPR\" in the hospice world. \" when this rn asked for clarification, charly responded, \"it is where you do gentle compressions. \" thi

## 2019-12-11 NOTE — CONSULTS
[facility]  Report of Consultation    Maria Dolores Paulson Patient Status:  Inpatient    10/30/1955 MRN [id number]   [facility] 5NW-A Attending Rosita Alfaro MD   Hosp Day # 0 PCP Gerald Black MD     Reason for Consultation:  CKD V/ 100 mL ADD-vantage, 3.375 g, Intravenous, Q8H  •  acetaminophen (TYLENOL) tab 650 mg, 650 mg, Oral, Q6H PRN  •  docusate sodium (COLACE) cap 100 mg, 100 mg, Oral, BID  •  bisacodyl (DULCOLAX) rectal suppository 10 mg, 10 mg, Rectal, Daily PRN  •  Labetalol Tartrate (LOPRESSOR) 5 MG/5ML injection 5 mg, 5 mg, Intravenous, Q6H PRN  •  levETIRAcetam (KEPPRA) 500 mg in sodium chloride 0.9% 100 mL IVPB, 500 mg, Intravenous, Q12H  No current outpatient medications on file.       Review of Systems:  Pt cannot provide 18.0 12/11/2019     12/11/2019    CA 7.9 12/11/2019    ALB 2.1 12/11/2019    ALKPHO 93 12/11/2019    BILT 0.2 12/11/2019    TP 5.6 12/11/2019    AST 16 12/11/2019    ALT 26 12/11/2019    PGLU 284 12/11/2019       BUN (mg/dL)   Date Value   12/11/201

## 2019-12-11 NOTE — ED NOTES
Spoke with Trang Pollock, transitional RN hospice, and Charly at pt bedside. Explained pt current condition, and reiterated the need for explicit direction of if pt is full code or hospice.  charly contaced state appointed guardian, Chela Mccall (standing in for Graeme blue

## 2019-12-11 NOTE — CM/SW NOTE
Met with pt and RN Peg Blount earlier this morning about 830 am to discuss plan for moving forward. Called and psoke with guardian who was filling in for Graeme today. She stated her records indicate full treatment as well as full code.  Sent in paperwork to juana

## 2019-12-11 NOTE — ED INITIAL ASSESSMENT (HPI)
Pt to ED brought by EMS from [facility] for being \"unresponsive\". Per EMS report, Pt was initially responsive to painful stimuli. Pt arrives opening her eyes spontaneously. Non-verbal per norm.

## 2019-12-11 NOTE — CONSULTS
Jose Moon [address]  Pulmonary/Critical Care Consult Note  [facility]  Report of Consultation    [place] Patient Status:  Inpatient    10/30/1955 MRN [id number]   [facility] 5NW-A Attending Kusum Shipley, ABSCESS       Family History   Problem Relation Age of Onset   • Cancer Neg       reports that she has quit smoking. Her smoking use included cigarettes. She smoked 1.00 pack per day.  She has never used smokeless tobacco. She reports that she does not drin 140/45 — — 72 17 94 % — —   12/11/19 0655 135/43 99 °F (37.2 °C) Temporal 73 11 96 % 5' 5\" (1.651 m) 204 lb 9.4 oz (92.8 kg)       Intake/Output:    Intake/Output Summary (Last 24 hours) at 12/11/2019 1817  Last data filed at 12/11/2019 0940  Gross per 24 DEV, THGB in the last 168 hours. Invalid input(s): [id number], CHOB    Cultures:     No results found for this visit on 12/11/19.   No results for input(s): Chano Reyes, [address] [facility], [address] [facility] [address] [facility] [place], [address], [address], [address] [facility], [address] [place] [address], [facility] EDUAR Daniels

## 2019-12-11 NOTE — ED NOTES
Tony Street from case management notified of \"gentle CPR\" concern. Stated he would let Kelvin Hooperod no and follow up appropriately.

## 2019-12-11 NOTE — SLP NOTE
Attempted to see patient for bedside swallowing evaluation on this date. Patient's level of alertness is impaired thus unable to participate in a BSSE. Will follow up on 12/12 pending patient's medical status and hospice decision making.      Cathy Trinidad

## 2019-12-11 NOTE — CM/SW NOTE
CM met with Dr Emily Rojas (palliative care,) [facility],) and bedside RN. There is an \"on call\" guardian for today. Adolfo Lambert has spoken with her aMdeleine Mustafa.)  For today we will leave patient as a full code and revoke hospice.   The plan for t

## 2019-12-11 NOTE — ED NOTES
This rn spoke with Shirlene from hospice on the phone. Reiterated the fact that we need to know if pt is full code or hospice. There is a distinction in the ED and this rn explained the difference. Full code is \"use all means necessary to sustain life. \" ho

## 2019-12-11 NOTE — H&P
[address] [facility] Patient Status:  Inpatient    10/30/1955 MRN [id number]   [facility] 5NW-A Attending Cornel Jimenez MD   Hosp Day # 0 PCP Doretha Garcia MD     Date:  2019  Date of Admissi Date   • BACK SURGERY      c5-c7 herniated disc   • HC INCISION AND DRAINAGE PERIRECTAL ABSCESS       Family History   Problem Relation Age of Onset   • Cancer Neg      Social History:  Social History    Tobacco Use      Smoking status: Former Smoker abd. folds  levetiracetam 1000 MG Oral Tab, Take 1,000 mg by mouth daily. Fludrocortisone Acetate 0.1 MG Oral Tab, Take 1 tablet (0.1 mg total) by mouth daily. sodium bicarbonate 650 MG Oral Tab, Take 1 tablet (650 mg total) by mouth 2 (two) times daily. Output —   Net 100 ml       Exam  General Appearance:    Ams,ill appearing  appears stated age   Head:    Normocephalic,  atraumatic   Neck:   Supple, symmetrical, trachea midline,        thyroid:  No enlargement/tenderness; no  JVD   Lungs:     Clear to Anteroseptal infarct Present       Ct Brain Or Head (55094)    Result Date: 12/4/2019  PROCEDURE:  CT BRAIN OR HEAD (01950)  COMPARISON:  ANGIE HAYWARD, CT BRAIN OR HEAD (06560), 10/29/2019, 10:56.   INDICATIONS:  new onset dysphagia, rule out cva  TECHNIQUE: 12/3/2019  PROCEDURE:  US VENOUS DOPPLER LEG BILAT - DIAG IMG (CPT=93970)  COMPARISON:  None. INDICATIONS:  bilat lower ext edema  TECHNIQUE:  Real time, grey scale, and duplex ultrasound was used to evaluate the lower extremity venous system.  B-mode two- Xr Chest Ap Portable  (cpt=71045)    Result Date: 12/3/2019  PROCEDURE:  XR CHEST AP PORTABLE  (CPT=71045)  TECHNIQUE:  AP chest radiograph was obtained. COMPARISON:  OUMAR HAYWARD, XR CHEST AP PORTABLE  (CPT=71045), 10/24/2019, 21:24.   INDICATIONS:  Bi Urinary tract infection without hematuria, site unspecified     Altered mental status, unspecified altered mental status type     Pyelonephritis     Nosocomial pneumonia     Leukocytosis, unspecified type     Stage 3 chronic kidney disease ([facility] [address]     CKD (c previous strokes  1. CT of the brain unremarkable  2. Neurology consulted who plans EEG tomorrow  10. Metabolic acidosis due to CKD and renal tubular acidosis  1. On bicarb pills  11. Hypernatremia  1. Follow BMP  2. On D5W  12 .  Failure to thrive and goal

## 2019-12-11 NOTE — PROGRESS NOTES
NURSING ADMISSION NOTE      Patient admitted via cart. Oriented to room. Safety precautions initiated. Bed in low position. Call light in reach. Weaned from 15L venti mask to 2L nasal canula, maintaining saturation at 94%.  Opens eyes to voice, n

## 2019-12-12 ENCOUNTER — APPOINTMENT (OUTPATIENT)
Dept: GENERAL RADIOLOGY | Facility: HOSPITAL | Age: 64
DRG: 177 | End: 2019-12-12
Attending: INTERNAL MEDICINE
Payer: MEDICARE

## 2019-12-12 PROBLEM — D63.1 ANEMIA IN STAGE 5 CHRONIC KIDNEY DISEASE, NOT ON CHRONIC DIALYSIS (HCC): Status: ACTIVE | Noted: 2019-08-06

## 2019-12-12 PROBLEM — N18.5 ANEMIA IN STAGE 5 CHRONIC KIDNEY DISEASE, NOT ON CHRONIC DIALYSIS (HCC): Status: ACTIVE | Noted: 2019-08-06

## 2019-12-12 PROCEDURE — 99232 SBSQ HOSP IP/OBS MODERATE 35: CPT | Performed by: INTERNAL MEDICINE

## 2019-12-12 PROCEDURE — 71045 X-RAY EXAM CHEST 1 VIEW: CPT | Performed by: INTERNAL MEDICINE

## 2019-12-12 RX ORDER — SODIUM CHLORIDE 9 MG/ML
INJECTION, SOLUTION INTRAVENOUS CONTINUOUS
Status: DISCONTINUED | OUTPATIENT
Start: 2019-12-12 | End: 2019-12-13

## 2019-12-12 NOTE — PROGRESS NOTES
Multidisciplinary Discharge Rounds held 12/12/2019. Treatment team members present today include , , Charge Nurse, Nurse, RT, PT and Pharmacy caring for [facility].      Other care providers present:    Mobility Goal:

## 2019-12-12 NOTE — DIETARY NOTE
[facility]    NUTRITION INITIAL ASSESSMENT    Pt does not meet malnutrition criteria. NUTRITION DIAGNOSIS/PROBLEM:    Inadequate energy intake related to physiological causes as evidenced by NPO. NUTRITION INTERVENTION:        1.  Meal and Snac protein/day (1.5-2 grams protein per kg)  Fluid: ~1 ml/kcal or per MD discretion    MONITOR AND EVALUATE/NUTRITION GOALS:    1. PO intake to meet at least 75% patient nutrition prescription  2. At least 75% intake of oral supplements  3.  No signs of skin b

## 2019-12-12 NOTE — CONSULTS
[facility] Pharmacy Note:  Renal Adjustment for piperacillin/tazobactam (ZOSYN)    Hannah Li is a 60year old female who has been prescribed piperacillin/tazobactam (ZOSYN) 3.375 gm every 8 hrs.   CrCl is estimated creatinine clearance is 9.9 mL/min (A)

## 2019-12-12 NOTE — PLAN OF CARE
Pt non-verbal, moaning out. Hx of CVA. Baseline, is able to sometimes answer yes/no to questions and sometimes follow commands. Contracted to LEATHA CONTRERAS. Tremors noted at times, seizure precautions in place.  Oral suction attempted to clear secretions, patien on oxygen saturation or ABGs  - Provide Smoking Cessation handout, if applicable  - Encourage broncho-pulmonary hygiene including cough, deep breathe, Incentive Spirometry  - Assess the need for suctioning and perform as needed  - Assess and instruct to re

## 2019-12-12 NOTE — SLP NOTE
ADULT SWALLOWING EVALUATION    ASSESSMENT    ASSESSMENT/OVERALL IMPRESSION:  Patient seen for swallowing evaluation per protocol. She is admitted due to lethargy.   She is well known to this department from recent admission during which she was evaluated a diet consistency    Problem List  Principal Problem:    Nosocomial pneumonia  Active Problems:    Altered mental status, unspecified altered mental status type      Past Medical History  Past Medical History:   Diagnosis Date   • Altered mental status Unable to assess  Strength: Unable to assess  Tone: Unable to assess  Range of Motion: Unable to assess  Rate of Motion: Unable to assess    Voice Quality: (alternated clear/hydrophonic at baseline)  Respiratory Status: Unlabored  Consistencies Trialed: Pu

## 2019-12-12 NOTE — CONSULTS
INFECTIOUS DISEASE [id number] [facility] Patient Status:  Inpatient    10/30/1955 MRN [id number]   [facility] 5NW-A Attending Farideh Martinez MD   [facility] Day # 1 PCP Veda Thacker acetaminophen (TYLENOL) tab 650 mg, 650 mg, Oral, Q6H PRN  •  docusate sodium (COLACE) cap 100 mg, 100 mg, Oral, BID  •  bisacodyl (DULCOLAX) rectal suppository 10 mg, 10 mg, Rectal, Daily PRN  •  Labetalol HCl (NORMODYNE) tab 200 mg, 200 mg, Oral, BID  • Q12H  •  sodium bicarbonate tab 1,300 mg, 1,300 mg, Oral, BID  •  epoetin keleey-epbx (RETACRIT) 5,000 Units injection, 5,000 Units, Intravenous, Once per day on Mon Wed Fri  •  ipratropium-albuterol (DUONEB) nebulizer solution 3 mL, 3 mL, Nebulization, Q4H Apply topically every third day. To scalp for seborrheic dermatitis, Disp: , Rfl:   nystatin [id number] UNIT/GM External Cream, Apply 1 Application topically 2 (two) times daily.  To groin, under breasts, jerald-area, and abd. folds, Disp: , Rfl:   levetiracetam communicative, moaning  Vital signs: Temp:  [98 °F (36.7 °C)-98.7 °F (37.1 °C)] 98 °F (36.7 °C)  Pulse:  [72-77] 72  Resp:  [16-18] 18  BP: (127-148)/(45-55) 135/46  HEENT: moist MM  Neck: No lymphadenopathy. supple, no masses  Respiratory: clear, no rales, kidney injury) ([facility] [address]     History of seizure     Cognitive impairment     Acute delirium     Severe episode of recurrent major depressive disorder, without psychotic features ([facility] [address]     UTI (urinary tract infection), bacterial     Visual hallucination     Es been enrolled in hospice, and they are clarifying with POA plan of care        Yuan Kinney MD  [address] [place] [address]  [phone]

## 2019-12-12 NOTE — PLAN OF CARE
Patient is alert, opens eyes to voice, no verbal response, not following commands, continues to moan. Weaned to 3L O2, oxygen saturation >90%, room air baseline. Diarrhea X1 occurrence- positive MD kiley paged, awaiting response. IV abx as ordered.  Pulmon mobilization and activity  - Obtain nutritional consult as needed  - Establish a toileting routine/schedule  - Consider collaborating with pharmacy to review patient's medication profile  Outcome: Progressing  Goal: Maintains adequate nutritional intake (u

## 2019-12-12 NOTE — PLAN OF CARE
Problem: Patient/Family Goals  Goal: Patient/Family Long Term Goal  Description  Patient's Long Term Goal: discharge back to [facility]    Interventions:  - IV abx, palliative care, wean O2 as needed, consults  - See additional Care Plan goals for specific i contributing to decreased intake, treat as appropriate  - Assist with meals as needed  - Monitor I&O, WT and lab values  - Obtain nutritional consult as needed  - Optimize oral hygiene and moisture  - Encourage food from home; allow for food preferences  -

## 2019-12-12 NOTE — PROGRESS NOTES
[facility]  Nephrology Progress Note    Mohini Garrett Patient Status:  Inpatient    10/30/1955 MRN [id number]   [facility] 5NW-A Attending Bobby Adler MD   Hosp Day # 1 PCP Jermaine Hall MD       SUBJECTIVE:  No acute events 16.0* 17.0* 18.0*   BUN 71* 70* 66* 71* 76*   CREATSERUM 4.92* 4.93* 4.81* 4.82* 5.30*   CA 7.7* 7.6* 7.8* 8.0* 7.9*   MG  --   --   --  1.8  --    * 211* 169* 212* 257*       Recent Labs   Lab 12/11/19  0656   ALT 26   AST 16   ALB 2.1*       Recen (Porcine) 5000 UNIT/ML injection 5,000 Units, 5,000 Units, Subcutaneous, Q12H  ondansetron HCl (ZOFRAN) injection 4 mg, 4 mg, Intravenous, Q6H PRN  metoprolol Tartrate (LOPRESSOR) 5 MG/5ML injection 5 mg, 5 mg, Intravenous, Q6H PRN  levETIRAcetam (KEPPRA)

## 2019-12-12 NOTE — PROGRESS NOTES
[facility] Pulmonary/Critical Care Progress Note     SUBJECTIVE/Interval history: All events, procedures, notes reviewed. No acute events overnight, remains confused and moaning constantly.  CDIFF positive      Review of 22.5*   MCV 95.7   MCH 29.8   MCHC 31.1   RDW 15.7*   NEPRELIM 10.14*   WBC 11.6*   .0     No results for input(s): BNP in the last 168 hours. No results for input(s): TROP, CK in the last 168 hours.   No results for input(s): PT, INR, PTT in the la mg Oral Daily   • insulin detemir  5 Units Subcutaneous Nightly   • Umeclidinium Bromide  1 puff Inhalation Daily   • cholestyramine light  4 g Oral Daily   • traZODone HCl  50 mg Oral Nightly   • torsemide  40 mg Oral Daily   • Insulin Aspart Pen  1-5 Uni

## 2019-12-12 NOTE — PROGRESS NOTES
[facility]  Progress Note    Marta Shannontaty Patient Status:  Inpatient    10/30/1955 MRN [id number]   [facility] 5NW-A Attending Orestes Kasper MD   Hosp Day # 1 PCP Meaghan Muñoz MD         SUBJECTIVE:  Subjective:  Yoselin Caldera URINE, CULTI, BLDSMR in the last 168 hours. Hospital Encounter on 12/11/19   1.  BLOOD CULTURE     Status: None (Preliminary result)    Collection Time: 12/11/19  9:11 AM   Result Value Ref Range    Blood Culture Result No Growth 1 Day N/A       Ct Brain Dictated by: Joaquín Bailey MD on 12/08/2019 at 9:56     Approved by: Joaquín Bailey MD on 12/08/2019 at 9:58          Us Venous Doppler Leg Bilat - Diag Img (cpt=93970)    Result Date: 12/3/2019  PROCEDURE:  US VENOUS DOPPLER LEG BILAT - DIAG I left lung base laterally and right suprahilar region. Findings are consistent with worsening pneumonia. No significant effusion. CONCLUSION:  Worsening pneumonia.     Dictated by: Paula Sanchez MD on 12/12/2019 at 7:26     Approved by: Marlyn Rojo, mild subsegmental atelectasis right lung base. CONCLUSION:  1. Suspicion for mild pulmonary vascular congestion and mild pulmonary edema. 2. Stable mild cardiomegaly. Dictated by: Nanda Velazco DO on 12/03/2019 at 16:06     Approved by:  Nanda Velazco Cognitive impairment     Acute delirium     Severe episode of recurrent major depressive disorder, without psychotic features ([facility] [address]     UTI (urinary tract infection), bacterial     Visual hallucination     Essential hypertension     Acute renal failure sup status type  As above     Bilateral pedal edema, acute kidney injury on chronic kidney disease stage V  1. Nephrology on consult  2. Progressive kidney failure due to diabetes mellitus and hypertension  3.  IV diuretics per nephrology  Anemia of chronic kid

## 2019-12-12 NOTE — PALLIATIVE CARE NOTE
ADDENDUM 3:15PM (Late Entry) - Arrived to unit to visit May Read ~ 1215 pm. Key Amin RN advised of events of the AM - see SLP note. She was moaning constantly - audible from hallway. Arms folded over chest. Tracks with eyes, does not follow commands.  Still d

## 2019-12-13 ENCOUNTER — APPOINTMENT (OUTPATIENT)
Dept: CT IMAGING | Facility: HOSPITAL | Age: 64
DRG: 177 | End: 2019-12-13
Attending: Other
Payer: MEDICARE

## 2019-12-13 PROCEDURE — 99223 1ST HOSP IP/OBS HIGH 75: CPT | Performed by: OTHER

## 2019-12-13 PROCEDURE — 99232 SBSQ HOSP IP/OBS MODERATE 35: CPT | Performed by: INTERNAL MEDICINE

## 2019-12-13 PROCEDURE — 95816 EEG AWAKE AND DROWSY: CPT | Performed by: OTHER

## 2019-12-13 PROCEDURE — 70450 CT HEAD/BRAIN W/O DYE: CPT | Performed by: OTHER

## 2019-12-13 RX ORDER — DEXTROSE AND SODIUM CHLORIDE 5; .45 G/100ML; G/100ML
INJECTION, SOLUTION INTRAVENOUS CONTINUOUS
Status: DISCONTINUED | OUTPATIENT
Start: 2019-12-13 | End: 2019-12-15

## 2019-12-13 RX ORDER — DEXTROSE AND SODIUM CHLORIDE 5; .9 G/100ML; G/100ML
INJECTION, SOLUTION INTRAVENOUS CONTINUOUS
Status: DISCONTINUED | OUTPATIENT
Start: 2019-12-13 | End: 2019-12-13

## 2019-12-13 RX ORDER — IPRATROPIUM BROMIDE AND ALBUTEROL SULFATE 2.5; .5 MG/3ML; MG/3ML
3 SOLUTION RESPIRATORY (INHALATION)
Status: DISCONTINUED | OUTPATIENT
Start: 2019-12-13 | End: 2019-12-15

## 2019-12-13 NOTE — PROGRESS NOTES
[facility]  Progress Note    Adair Pineda Patient Status:  Inpatient    10/30/1955 MRN [id number]   [facility] 5NW-A Attending Edvin Batista MD   Hosp Day # 2 PCP Diego Dumont MD         SUBJECTIVE:  Subjective:  Tr[place] Du[place] 122*   CO2 16.0* 16.0* 17.0* 18.0* 16.0*   BUN 70* 66* 71* 76* 71*   CREATSERUM 4.93* 4.81* 4.82* 5.30* 4.85*   CA 7.6* 7.8* 8.0* 7.9* 8.5   MG  --   --  1.8  --   --    * 169* 212* 257* 99       Recent Labs   Lab 12/11/19  0656   ALT 26   AST 16 [facility] , , US KIDNEY/BLADDER (VZR=[id number]), 4/07/2019, 14:48. INDICATIONS:  retention  TECHNIQUE:  Transabdominal gray scale ultrasound imaging of the bladder was performed. Routine technique was utilized.    PATIENT STATED HISTORY: (As transcribed by Jaci STATED HISTORY: (As transcribed by Technologist)  Patient offered no additional history at this time. FINDINGS:  The lung volumes are again noted to be low. There is stable elevation the right hemidiaphragm.   There is stable mild cardiomegaly with slig INDICATIONS:  Bilateral lower ext edema  PATIENT STATED HISTORY: (As transcribed by Technologist)  Patient offered no additional history at this time.     FINDINGS:  There is mild cardiomegaly, similar to the previous study with suggestion of mild pulmonary Tartrate, ipratropium-albuterol       Assessment/Plan:   Patient Active Problem List:     Anemia     Leukocytosis     Azotemia     Metabolic acidosis     Toxic metabolic encephalopathy     Hyperglycemia     Sepsis due to urinary tract infection (HCC)     H chronic congestive heart failure, unspecified heart failure type (HCC)     Lower extremity edema     Hypokalemia    Principal Problem:    Nosocomial pneumonia  Active Problems:    Altered mental status, unspecified altered mental status type    Anemia in s

## 2019-12-13 NOTE — SLP NOTE
SPEECH DAILY NOTE - INPATIENT    ASSESSMENT & PLAN   ASSESSMENT  Patient seen to reassess for any improvement in swallow function. Note events of overnight. She is alert with eyes open to voice but not answering questions or following commands.   She is p

## 2019-12-13 NOTE — PALLIATIVE CARE NOTE
I called Lacho Pierson, the guardian, to discuss ongoing care for Dale Raymundo based on Dr. Jamar Irvin recommendation. Lacho Pierson stated he is comfortable with having hospice support for Dale Raymundo although is not willing to change her code status.  I reached out to Resident

## 2019-12-13 NOTE — PLAN OF CARE
0200 patients O2 sats dropping to 82%. Needing up to 10L nc, and then respiratory called to try to help suction. Two people needed because patient clamps down on anything put in mouth and will not reopen.   Bite blocks used to prop mouth open and 10 frenc

## 2019-12-13 NOTE — CONSULTS
[id number] [facility] Neurology Consultation    Date of consult: 12/13/2019    Reason for consult: AMS    HPI: Sky Monahan is a  60year old female, with an extensive PMH including COPD, hx of spinal cord injury, DM, HTN, CKD (not a candidate for h inhaler 1 puff, 1 puff, Inhalation, Daily  cholestyramine light (PREVALITE) powder packet 4 g, 4 g, Oral, Daily  traZODone HCl (DESYREL) tab 50 mg, 50 mg, Oral, Nightly  torsemide (DEMADEX) tab 40 mg, 40 mg, Oral, Daily  Insulin Aspart Pen (NOVOLOG) 100 UN Past Surgical History:   Procedure Laterality Date   • BACK SURGERY      c5-c7 herniated disc   • HC INCISION AND DRAINAGE PERIRECTAL ABSCESS       Social History:  Social History    Tobacco Use      Smoking status: Former Smoker        Packs/day: 1.00 8. 5   ALB  --  2.1*  --     141 148*   K 4.8 5.0 4.6   * 115* 122*   CO2 17.0* 18.0* 16.0*   ALKPHO  --  93  --    AST  --  16  --    ALT  --  26  --    BILT  --  0.2  --    TP  --  5.6*  --           Assessment:  Toxic metabolic encephalopathy

## 2019-12-13 NOTE — PLAN OF CARE
Problem: Impaired Swallowing  Goal: Minimize aspiration risk  Description  Interventions:  -NPO  -Oral Care   Outcome: Not Progressing

## 2019-12-13 NOTE — PROGRESS NOTES
Palliative Care  Follow up from [facility] encounter yesterday with attempt to contact patient's Geraldine Akash Appointed Guardian: Kylee Hopkins. Call initially to his office [phone] and VM left. Call to Mobile: [phone] and was able to reach.  He

## 2019-12-13 NOTE — PROGRESS NOTES
[facility]                INFECTIOUS DISEASE PROGRESS NOTE    Adair Pineda Patient Status:  Inpatient    10/30/1955 MRN [id number]   [facility] 5NW-A Attending Edvin Batista MD   Hosp Day # 2 PCP Diego Dumont MD     Ant infection ([facility] [address]     Hyperkalemia     Encephalopathy in sepsis     Sepsis ([facility])     Diabetes ([address]     CKD (chronic kidney disease) stage 3, GFR 30-59 ml/min ([facility])     AHSAN (acute kidney injury) ([facility] [address]     History of seizure     Cognitive impairment     Acute CVA  Reviewed notes, discussions re goals of care/possible hospice      Norma Frances MD  [facility]  [phone]

## 2019-12-13 NOTE — PROGRESS NOTES
[facility] Pulmonary/Critical Care Progress Note     SUBJECTIVE/Interval history: All events, procedures, notes reviewed. RN notes mucus plugging overnight, now improved.  Still with poor mentation and moaning frequently 10*   GFRNAA 9* 8* 9*   CA 8.0* 7.9* 8.5    141 148*   K 4.8 5.0 4.6   * 115* 122*   CO2 17.0* 18.0* 16.0*     Recent Labs   Lab 12/11/19  0656   RBC 2.35*   HGB 7.0*   HCT 22.5*   MCV 95.7   MCH 29.8   MCHC 31.1   RDW 15.7*   NEPRELIM 10.14* Acetate  0.1 mg Oral Daily   • ferrous sulfate  325 mg Oral BID with meals   • folic acid  1 mg Oral Daily   • Fluticasone Furoate-Vilanterol  1 puff Inhalation Daily   • escitalopram  10 mg Oral Daily   • insulin detemir  5 Units Subcutaneous Nightly   •

## 2019-12-13 NOTE — CONSULTS
[facility]  Report of Inpatient Wound Care Consultation     Serina Edouard Patient Status:  Inpatient    10/30/1955 MRN [id number]   [facility] 5NW-A Attending Marisel Bolanos MD   Hosp Day # 2 PCP Myra Lawton MD     REASON F --   --    HCT  --   --  22.5*  --   --   --   --   --    PLT  --   --  242.0  --   --   --   --   --    K 4.8  --  5.0  --   --   --  4.6  --    CREATSERUM 4.82*  --  5.30*  --   --   --  4.85*  --    BUN 71*  --  76*  --   --   --  71*  --    *  - the Inpatient Wound Care pager at #[phone]. Time Spent 45 Minutes. Thank you,    Sher Saldana.  Radha Stovall, PT, MPT  [facility]  [address] [address]  [address]  [phone]  Sp

## 2019-12-13 NOTE — PLAN OF CARE
Pt non-verbal, moaning out. Hx of CVA. Baseline, is able to sometimes answer yes/no to questions and sometimes follow commands. Contracted to LEATHA CONTRERAS. Tremors noted at times, seizure precautions in place. EEG completed today, negative for seizure.  CT of h Incentive Spirometry  - Assess the need for suctioning and perform as needed  - Assess and instruct to report SOB or any respiratory difficulty  - Respiratory Therapy support as indicated  - Manage/alleviate anxiety  - Monitor for signs/symptoms of CO2 ret

## 2019-12-13 NOTE — PROGRESS NOTES
Pt has urinated very little all day. Bladder scan showing 0. Nephrology notified of low urine output. Order to start IVF and monitor.

## 2019-12-13 NOTE — PROCEDURES
Date of Procedure: 12/13/2019    Procedure: EEG (ELECTROENCEPHALOGRAM)     DX: AMS, SEIZURE  HX: A 63 YO FEMALE WHO PRESENTS WITH PNEUMONIA. PT HAS HX OF EPILEPSY.  PER RN PT SEEMS LESS RESPONSIVE COMPARED TO BASELINE, PT IS ABLE TO AROUSE WITH STIMULATION,

## 2019-12-13 NOTE — PROGRESS NOTES
[facility]  Nephrology Progress Note    Marta Cole Patient Status:  Inpatient    10/30/1955 MRN [id number]   [facility] 5NW-A Attending Orestes Kasper MD   Hosp Day # 2 PCP Meaghan Muñoz MD       SUBJECTIVE:  Remains poorly 115*   CO2 16.0* 16.0* 17.0* 18.0*   BUN 70* 66* 71* 76*   CREATSERUM 4.93* 4.81* 4.82* 5.30*   CA 7.6* 7.8* 8.0* 7.9*   MG  --   --  1.8  --    * 169* 212* 257*       Recent Labs   Lab 12/11/19  0656   ALT 26   AST 16   ALB 2.1*       Recent Labs injection 10 mg, 10 mg, Intravenous, Q6H PRN  diphenhydrAMINE HCl (BENADRYL) injection 25 mg, 25 mg, Intravenous, Q6H PRN  Heparin Sodium (Porcine) 5000 UNIT/ML injection 5,000 Units, 5,000 Units, Subcutaneous, Q12H  ondansetron HCl (ZOFRAN) injection 4 mg

## 2019-12-13 NOTE — PLAN OF CARE
Problem: Patient/Family Goals  Goal: Patient/Family Long Term Goal  Description  Patient's Long Term Goal: discharge back to [facility]    Interventions:  - IV abx, palliative care, wean O2 as needed, consults  - See additional Care Plan goals for specific i each meal consumed  - Identify factors contributing to decreased intake, treat as appropriate  - Assist with meals as needed  - Monitor I&O, WT and lab values  - Obtain nutritional consult as needed  - Optimize oral hygiene and moisture  - Encourage food f

## 2019-12-13 NOTE — PHYSICAL THERAPY NOTE
Physical Therapy     Orders received per [facility] protocol. Reviewed chart which reveals pt is dependent for all adl's and mobility at baseline. Pt is long term resident at [facility]. Pt is not appropriate candidate for PT at this time, will sign off of case.

## 2019-12-14 PROCEDURE — 99233 SBSQ HOSP IP/OBS HIGH 50: CPT | Performed by: OTHER

## 2019-12-14 PROCEDURE — 99232 SBSQ HOSP IP/OBS MODERATE 35: CPT | Performed by: INTERNAL MEDICINE

## 2019-12-14 RX ORDER — FUROSEMIDE 10 MG/ML
40 INJECTION INTRAMUSCULAR; INTRAVENOUS
Status: DISCONTINUED | OUTPATIENT
Start: 2019-12-14 | End: 2019-12-20

## 2019-12-14 RX ORDER — ACETYLCYSTEINE 200 MG/ML
3 SOLUTION ORAL; RESPIRATORY (INHALATION) 3 TIMES DAILY
Status: DISCONTINUED | OUTPATIENT
Start: 2019-12-14 | End: 2019-12-18

## 2019-12-14 NOTE — PROGRESS NOTES
[facility]                INFECTIOUS DISEASE PROGRESS NOTE    Hannah Marrow Patient Status:  Inpatient    10/30/1955 MRN [id number]   [facility] 5NW-A Attending Case Cintron MD   Hosp Day # 3 PCP Siria Mejia MD     Ant infection ([facility] [address]     Hyperkalemia     Encephalopathy in sepsis     Sepsis ([facility])     Diabetes ([address]     CKD (chronic kidney disease) stage 3, GFR 30-59 ml/min ([facility])     AHSAN (acute kidney injury) ([facility] [address]     History of seizure     Cognitive impairment     Acute CVA  More alert      Hannah Chahal MD  [facility]  [phone]

## 2019-12-14 NOTE — PROGRESS NOTES
[facility]  Progress Note    Homero Flores Patient Status:  Inpatient    10/30/1955 MRN [id number]   [facility] 5NW-A Attending Courtney Lemus MD   Hosp Day # 3 PCP Stefan Kim MD         SUBJECTIVE:  Subjective:  Vanda Sanchez 7. 8* 8.0* 7.9* 8.5 8.4*   MG  --  1.8  --   --   --    * 212* 257* 99 173*       Recent Labs   Lab 12/11/19  0656   ALT 26   AST 16   ALB 2.1*       Recent Labs   Lab 12/13/19  0929 12/13/19  1338 12/13/19  1749 12/13/19  2123 12/14/19  0851   PGLU scale ultrasound imaging of the bladder was performed. Routine technique was utilized.    PATIENT STATED HISTORY: (As transcribed by Technologist)     MEASUREMENTS:              Prevoid Bladder Volume:  414 cc   FINDINGS:   BLADDER:  Cristina Nickel lung volumes are again noted to be low. There is stable elevation the right hemidiaphragm. There is stable mild cardiomegaly with slight vascular redistribution without interstitial edema.   Worsening mixed interstitial alveolar infiltrate the lower 1/2 o additional history at this time. FINDINGS:  There is mild cardiomegaly, similar to the previous study with suggestion of mild pulmonary vascular congestion. Mild pulmonary edema can also not be excluded with mild diffuse pulmonary vascular prominence. Problem List:     Anemia     Leukocytosis     Azotemia     Metabolic acidosis     Toxic metabolic encephalopathy     Hyperglycemia     Sepsis due to urinary tract infection (HCC)     Hyperkalemia     Encephalopathy in sepsis     Sepsis ([address]     Diabetes ( Lower extremity edema     Hypokalemia    Principal Problem:    Nosocomial pneumonia  Active Problems:    Altered mental status, unspecified altered mental status type    Anemia in stage 5 chronic kidney disease, not on chronic dialysis [facility])          Plan ongoing care for Ean Hilt based on all mds 's recommendation. Yolanda Li stated he is comfortable with having hospice support for Vail Hilt although is not willing to change her code status.   Per PC apn-After discussing with Hospice/ Shirlene and Kenneth/ guardi

## 2019-12-14 NOTE — PROGRESS NOTES
[id number] [facility] Neurology Progress Note    Declan Martines Patient Status:  Inpatient    10/30/1955 MRN [id number]   [facility] 5NW-A Attending Greer Hunter MD   Hosp Day # 3 PCP Romina Cao MD     CC: Aphasia    Subjective: pneumonia/CKD  ? EEG negative for seizures  ? CT brain negative  ? Minimize sedatives and narcotics, as able  · Hx of epilepsy  ? Seizure precautions  ? Keppra 500mg BID  ? Keppra level 42  · DM  · Hx of spinal cord injury    Patient with AMS.   EEG and CT

## 2019-12-14 NOTE — PLAN OF CARE
Problem: Patient/Family Goals  Goal: Patient/Family Long Term Goal  Description  Patient's Long Term Goal: discharge back to [facility]    Interventions:  - IV abx, palliative care, wean O2 as needed, consults  - See additional Care Plan goals for specific i nutritional intake (undernourished)  Description  INTERVENTIONS:  - Monitor percentage of each meal consumed  - Identify factors contributing to decreased intake, treat as appropriate  - Assist with meals as needed  - Monitor I&O, WT and lab values  - Obta

## 2019-12-14 NOTE — SLP NOTE
SPEECH DAILY NOTE - INPATIENT    ASSESSMENT & PLAN   ASSESSMENT  Pt seen for dysphagia therapy to monitor po tolerance of recommended diet and ensure adherence to aspiration precautions. Pt remains lethargic and inappropriate for safe po at this time.   Earl Lynne

## 2019-12-14 NOTE — PROGRESS NOTES
[facility]  Nephrology Progress Note    Marcin Mendoza Patient Status:  Inpatient    10/30/1955 MRN [id number]   [facility] 5NW-A Attending Kelvin Thakkar MD   Hosp Day # 3 PCP Tatianna Sherwood MD       SUBJECTIVE:  Chart reviewed Units, Subcutaneous, TID PC  hydrALAzine HCl (APRESOLINE) injection 10 mg, 10 mg, Intravenous, Q6H PRN  diphenhydrAMINE HCl (BENADRYL) injection 25 mg, 25 mg, Intravenous, Q6H PRN  Heparin Sodium (Porcine) 5000 UNIT/ML injection 5,000 Units, 5,000 Units, S PLT, BAND, INR in the last 72 hours.     Invalid input(s): LYM#, MONO#, BASOS#, EOSIN#    Recent Labs     12/13/19  0948 12/14/19  0634   * 147*   K 4.6 4.5   * 122*   CO2 16.0* 15.0*   BUN 71* 68*   CREATSERUM 4.85* 4.77*   CA 8.5 8.4*       No

## 2019-12-14 NOTE — PROGRESS NOTES
[facility] Pulmonary/Critical Care Progress Note     SUBJECTIVE/Interval history: All events, procedures, notes reviewed. No acute events, RN and RT note poor secretion clearance despite nebs.  Patient still with difficu 12/11/19  0656 12/13/19  0948 12/14/19  0634   * 99 173*   BUN 76* 71* 68*   CREATSERUM 5.30* 4.85* 4.77*   GFRAA 9* 10* 11*   GFRNAA 8* 9* 9*   CA 7.9* 8.5 8.4*    148* 147*   K 5.0 4.6 4.5   * 122* 122*   CO2 18.0* 16.0* 15.0*     Rece risperiDONE  0.5 mg Oral BID   • Fludrocortisone Acetate  0.1 mg Oral Daily   • ferrous sulfate  325 mg Oral BID with meals   • folic acid  1 mg Oral Daily   • Fluticasone Furoate-Vilanterol  1 puff Inhalation Daily   • escitalopram  10 mg Oral Daily   • i

## 2019-12-14 NOTE — PROGRESS NOTES
PATIENT ALERT/AWAKE. HER EYES ARE OPEN BUT SHE DOES NOT TRACK PEOPLE IN HER ROOM. SHE DOES NOT ANSWER QUESTIONS. SHE DOES NOT FOLLOW COMMANDS.  +TREMORS. SATURATING IN THE LOW 90'S ON SUPPLEMENTAL O2 AT 3L/NC.   + OCCASIONAL PRODUCTIVE COUGH.  SUCT

## 2019-12-15 ENCOUNTER — APPOINTMENT (OUTPATIENT)
Dept: GENERAL RADIOLOGY | Facility: HOSPITAL | Age: 64
DRG: 177 | End: 2019-12-15
Attending: INTERNAL MEDICINE
Payer: MEDICARE

## 2019-12-15 PROCEDURE — 99233 SBSQ HOSP IP/OBS HIGH 50: CPT | Performed by: OTHER

## 2019-12-15 PROCEDURE — 99232 SBSQ HOSP IP/OBS MODERATE 35: CPT | Performed by: INTERNAL MEDICINE

## 2019-12-15 PROCEDURE — 5A09357 ASSISTANCE WITH RESPIRATORY VENTILATION, LESS THAN 24 CONSECUTIVE HOURS, CONTINUOUS POSITIVE AIRWAY PRESSURE: ICD-10-PCS | Performed by: INTERNAL MEDICINE

## 2019-12-15 PROCEDURE — 71045 X-RAY EXAM CHEST 1 VIEW: CPT | Performed by: INTERNAL MEDICINE

## 2019-12-15 RX ORDER — IPRATROPIUM BROMIDE AND ALBUTEROL SULFATE 2.5; .5 MG/3ML; MG/3ML
3 SOLUTION RESPIRATORY (INHALATION)
Status: DISCONTINUED | OUTPATIENT
Start: 2019-12-15 | End: 2019-12-18

## 2019-12-15 RX ORDER — SODIUM BICARBONATE 325 MG/1
1300 TABLET ORAL 3 TIMES DAILY
Status: DISCONTINUED | OUTPATIENT
Start: 2019-12-15 | End: 2019-12-20

## 2019-12-15 NOTE — PROGRESS NOTES
[id number] [facility] Neurology Progress Note    Evon Ho Patient Status:  Inpatient    10/30/1955 MRN [id number]   [facility] 5NW-A Attending Johnny Myers MD   Hosp Day # 4 PCP Christian Rodriguez MD     CC: Aphasia    Subjective: intracranial process     Assessment/Plan:  · AMS/Aphasia in setting of nosocomial pneumonia/CKD  ? EEG negative for seizures  ? CT brain negative  ?  Minimize sedatives and narcotics, as able  · Hx of epilepsy- right hand tremors is stimulus induced and met

## 2019-12-15 NOTE — PROGRESS NOTES
RECEIVED A CALL FROM TELEMETRY MONITOR Phoodeez REPORTING PATIENT'S O2 SATURATION AT 85% A7 2211. PATIENT WAS GURGLY AND MOUTH BREATHING. SHE WAS SUCTIONED ORALLY - MODERATE AMOUNT OF WHITE THICK SECRETIONS WAS OBTAINED. LUNGS COARSE. [facility]

## 2019-12-15 NOTE — PROGRESS NOTES
Assumed care of pt at approximately 0000. She opens her eyes to verbal stimuli, did not follow any commands or speak. She was on O2 10L high flow/NC, weaned down to O2 6L high flow NC at this time. Tele is sr.   IVF infusing as ordered without difficulty

## 2019-12-15 NOTE — PROGRESS NOTES
[facility]                INFECTIOUS DISEASE PROGRESS NOTE    Kat Fischer Patient Status:  Inpatient    10/30/1955 MRN [id number]   [facility] 5NW-A Attending Jesi Foreman MD   Hosp Day # 4 PCP Adair Mendenhall MD     Ant acidosis     Toxic metabolic encephalopathy     Hyperglycemia     Sepsis due to urinary tract infection (HCC)     Hyperkalemia     Encephalopathy in sepsis     Sepsis (HCC)     Diabetes (HCC)     CKD (chronic kidney disease) stage 3, GFR 30-59 ml/min ([facility]) disease ([facility] [place] [address]     Hypervolemia      ASSESSMENT/PLAN:  1. Aspiration pneumonia, NPO, 02, empiric abx  2. CDIFF/unable to place dobhoff, on iv flagyl  3.  HO CVA    Swallow ghada-shellie Dunlap MD  [facility]  [phone]

## 2019-12-15 NOTE — PROGRESS NOTES
PATIENT HAD COPIOUS AMOUNT OF SECRETIONS. SHE IS NOW ON O2 AT 10L HFNC SATURATING IN THE HIGH 90''S. WILL ENDORSE.

## 2019-12-15 NOTE — PLAN OF CARE
Patient nonverbal and moaning out. Incomprehensible speech. Sometimes follows commands. Contracted to RUE and RLE. Tremors at times and seizure precautions in place, IV keppra. Patient suctioned with bite block in place.  Patient maintaining O2 sats 95> on returns to baseline bowel function  Description  INTERVENTIONS:  - Assess bowel function  - Maintain adequate hydration with IV or PO as ordered and tolerated  - Evaluate effectiveness of GI medications  - Encourage mobilization and activity  - Obtain nutr

## 2019-12-15 NOTE — PLAN OF CARE
Problem: Patient/Family Goals  Goal: Patient/Family Long Term Goal  Description  Patient's Long Term Goal: discharge back to [facility]    Interventions:  - IV abx, palliative care, wean O2 as needed, consults  - See additional Care Plan goals for specific i profile  Outcome: Progressing  Goal: Maintains adequate nutritional intake (undernourished)  Description  INTERVENTIONS:  - Monitor percentage of each meal consumed  - Identify factors contributing to decreased intake, treat as appropriate  - Assist with m

## 2019-12-15 NOTE — PROGRESS NOTES
[facility] Progress Note        Madison Kohli Patient Status:  Inpatient    10/30/1955 MRN [id number]   [facility] 5NW-A Attending Marcus Atkins MD   Hosp Day # 4 PCP Rosanna Barfield MD     Subjective:  Patient awake.   No verba Readings from Last 3 Encounters:  12/13/19 : 216 lb 6.4 oz (98.2 kg)  12/09/19 : 204 lb 9.4 oz (92.8 kg)  10/30/19 : 191 lb 12.8 oz (87 kg)          Physical Exam:    Temp:  [97.3 °F (36.3 °C)-98.1 °F (36.7 °C)] 97.3 °F (36.3 °C)  Pulse:  [70-84] 78  Resp:

## 2019-12-15 NOTE — PROGRESS NOTES
[facility] Pulmonary/Critical Care Progress Note     SUBJECTIVE/Interval history: All events, procedures, notes reviewed. Desaturation overnight and improved with suctioning and neb administration.   o2 increased to 10 a CREATSERUM 4.85* 4.77* 4.76*   GFRAA 10* 11* 11*   GFRNAA 9* 9* 9*   CA 8.5 8.4* 8.2*   * 147* 148*   K 4.6 4.5 4.2   * 122* 124*   CO2 16.0* 15.0* 15.0*     Recent Labs   Lab 12/11/19  0656   RBC 2.35*   HGB 7.0*   HCT 22.5*   MCV 95.7   MCH • escitalopram  10 mg Oral Daily   • insulin detemir  5 Units Subcutaneous Nightly   • Umeclidinium Bromide  1 puff Inhalation Daily   • cholestyramine light  4 g Oral Daily   • traZODone HCl  50 mg Oral Nightly   • torsemide  40 mg Oral Daily   • Insuli

## 2019-12-15 NOTE — PROGRESS NOTES
[facility]  Nephrology Progress Note    Serinajesus Hodgeslier Patient Status:  Inpatient    10/30/1955 MRN [id number]   [facility] 5NW-A Attending Marisel Bolanos MD   Hosp Day # 4 PCP Myra Lawton MD       SUBJECTIVE:  Chart reviewed [facility]) tab 40 mg, 40 mg, Oral, Daily  Insulin Aspart Pen (NOVOLOG) 100 UNIT/ML flexpen 1-5 Units, 1-5 Units, Subcutaneous, TID PC  hydrALAzine HCl (APRESOLINE) injection 10 mg, 10 mg, Intravenous, Q6H PRN  diphenhydrAMINE HCl (BENADRYL) injection 25 mg, edema  Neurologic: contracted  Skin: Warm and dry, no rash    No results for input(s): WBC, HGB, MCV, PLT, BAND, INR in the last 72 hours.     Invalid input(s): LYM#, MONO#, BASOS#, EOSIN#    Recent Labs     12/13/19  0948 12/14/19  0634 12/15/19  0617   NA

## 2019-12-15 NOTE — PLAN OF CARE
Patient nonverbal and moaning out. Incomprehensible speech. Sometimes follows commands. Contracted to RUE and RLE. Tremors at times and seizure precautions in place, IV keppra. Patient suctioned with bite block in place.  Patient maintaining O2 sats on 4L n to baseline bowel function  Description  INTERVENTIONS:  - Assess bowel function  - Maintain adequate hydration with IV or PO as ordered and tolerated  - Evaluate effectiveness of GI medications  - Encourage mobilization and activity  - Obtain nutritional

## 2019-12-16 ENCOUNTER — APPOINTMENT (OUTPATIENT)
Dept: GENERAL RADIOLOGY | Facility: HOSPITAL | Age: 64
DRG: 177 | End: 2019-12-16
Attending: INTERNAL MEDICINE
Payer: MEDICARE

## 2019-12-16 PROCEDURE — 71045 X-RAY EXAM CHEST 1 VIEW: CPT | Performed by: INTERNAL MEDICINE

## 2019-12-16 PROCEDURE — 99232 SBSQ HOSP IP/OBS MODERATE 35: CPT | Performed by: INTERNAL MEDICINE

## 2019-12-16 NOTE — PROGRESS NOTES
[facility] Pulmonary/Critical Care Progress Note     SUBJECTIVE/Interval history: All events, procedures, notes reviewed. No acute events overnight, tolerated BPAP which was placed due to hypercapnia.  Improved mentation 12/14/19  0634 12/15/19  0617 12/16/19  0618   * 182* 181*   BUN 68* 66* 66*   CREATSERUM 4.77* 4.76* 4.54*   GFRAA 11* 11* 11*   GFRNAA 9* 9* 10*   CA 8.4* 8.2* 8.0*   * 148* 149*   K 4.5 4.2 4.1   * 124* 123*   CO2 15.0* 15.0* 18.0* DO on 12/15/2019 at 11:18            • ipratropium-albuterol  3 mL Nebulization 6 times per day   • sodium bicarbonate  1,300 mg Per G Tube TID   • acetylcysteine  3 mL Nebulization TID   • furosemide  40 mg Intravenous BID (Diuretic)   • cefTRIAXone  1 g bronchodilator, chest PT/ airway clearance measures  · Will adjust NIPPV - adjust previous BPAP settings to increase delta pressure given continued hypercapnia (relative to metabolic acidosis)  · Repeat ABG in am  · Wean o2 for sats goal 88-92%  · Aspirati

## 2019-12-16 NOTE — PLAN OF CARE
Problem: Patient/Family Goals  Goal: Patient/Family Short Term Goal  Description  Patient's Short Term Goal:   12/11am (admission): get patient comfortable  12/12 am: to work with speech, get diet.    12/13 am: to manage secretions  12/13/2019 - CONTROL B decreased intake, treat as appropriate  - Assist with meals as needed  - Monitor I&O, WT and lab values  - Obtain nutritional consult as needed  - Optimize oral hygiene and moisture  - Encourage food from home; allow for food preferences  - Enhance eating Assess for and report changes in neurological status  - Initiate measures to prevent increased intracranial pressure  - Maintain blood pressure and fluid volume within ordered parameters to optimize cerebral perfusion and minimize risk of hemorrhage  - Mon

## 2019-12-16 NOTE — PROGRESS NOTES
Medical chart reviewed  Discussion with palliative care team regarding goals of care discussions with [facility] advocate. Goals of care established  Code status remains full code. Will sign off.     Thank you for consulting our palliative care ser

## 2019-12-16 NOTE — PROGRESS NOTES
Multidisciplinary Discharge Rounds held 12/16/2019. Treatment team members present today include , , Charge Nurse, Nurse, RT, PT and Pharmacy caring for [facility].      Other care providers present:    Mobility Goal: Tu

## 2019-12-16 NOTE — PROGRESS NOTES
[facility]  Nephrology Progress Note    Adair Pineda Patient Status:  Inpatient    10/30/1955 MRN [id number]   [facility] 5NW-A Attending Edvin Batista MD   Hosp Day # 5 PCP Diego Dumont MD       SUBJECTIVE:  Chart reviewed (PREVALITE) powder packet 4 g, 4 g, Oral, Daily  traZODone HCl (DESYREL) tab 50 mg, 50 mg, Oral, Nightly  torsemide (DEMADEX) tab 40 mg, 40 mg, Oral, Daily  Insulin Aspart Pen (NOVOLOG) 100 UNIT/ML flexpen 1-5 Units, 1-5 Units, Subcutaneous, TID PC  hydrAL bowel sounds, no palpable organomegaly  Extremities: 1-2+ BLE edema  Neurologic: contracted  Skin: Warm and dry, no rash  No results for input(s): WBC, HGB, MCV, PLT, BAND, INR in the last 72 hours.     Invalid input(s): LYM#, MONO#, BASOS#, EOSIN#    Recen

## 2019-12-16 NOTE — PROGRESS NOTES
[facility]  Progress Note    Maria Dolores Paulson Patient Status:  Inpatient    10/30/1955 MRN [id number]   [facility] 5NW-A Attending Rosita Alfaro MD   Hosp Day # 5 PCP Gerald Black MD         SUBJECTIVE:  Subjective:  Cj Yen 71* 76* 71* 68* 66* 66*   CREATSERUM 4.82* 5.30* 4.85* 4.77* 4.76* 4.54*   CA 8.0* 7.9* 8.5 8.4* 8.2* 8.0*   MG 1.8  --   --   --   --  1.9   * 257* 99 173* 182* 181*       Recent Labs   Lab 12/11/19  0656   ALT 26   AST 16   ALB 2.1*       Recent L 4/07/2019, 14:48. INDICATIONS:  retention  TECHNIQUE:  Transabdominal gray scale ultrasound imaging of the bladder was performed. Routine technique was utilized.    PATIENT STATED HISTORY: (As transcribed by Technologist)     MEASUREMENTS:              Pr Technologist)  Patient offered no additional history at this time. FINDINGS:  The lung volumes are again noted to be low. There is stable elevation the right hemidiaphragm.   There is stable mild cardiomegaly with slight vascular redistribution without PATIENT STATED HISTORY: (As transcribed by Technologist)  Patient offered no additional history at this time. FINDINGS:  There is mild cardiomegaly, similar to the previous study with suggestion of mild pulmonary vascular congestion.   Mild pulmonary viktor HCl, hydrALAzine HCl, diphenhydrAMINE HCl, ondansetron HCl, metoprolol Tartrate, ipratropium-albuterol       Assessment/Plan:   Patient Active Problem List:     Anemia     Leukocytosis     Azotemia     Metabolic acidosis     Toxic metabolic encephalopathy ([facility]     Goals of care, counseling/discussion     Candidiasis     Acute on chronic congestive heart failure, unspecified heart failure type ([facility]     Lower extremity edema     Hypokalemia     Stage 4 chronic kidney disease ([facility]     Hypervolemia     Acido disease due to CKD has been on STU /monitor H&H    Hypertension currently n.p.o. and on hyDralzine metoprolol IV as needed   c diff-iv flagyl    See tests ordered,  Available and radiology reviewed  All consultant notes reviewed  Discussed with nursing on

## 2019-12-16 NOTE — CDS QUERY
Impaired Gas Exchange  CLINICAL DOCUMENTATION CLARIFICATION FORM  Clinical information (provided below) indicates impaired gas exchange.  For accurate ICD-10-CM code assignment to reflect severity of illness and risk of mortality,  PLEASE (X) ALL DIAGNOSES

## 2019-12-16 NOTE — PLAN OF CARE
Patient verbal at times and moaning out. Patient A&Ox2 when communicating. Sometimes follows commands. Contracted to RUE and RLE. Tremors at times and seizure precautions in place, IV keppra.  Patient maintaining O2 sats 95> on 2L nc and patient to wear bip Manage/alleviate anxiety  - Monitor for signs/symptoms of CO2 retention  Outcome: Progressing     Problem: GASTROINTESTINAL - ADULT  Goal: Maintains or returns to baseline bowel function  Description  INTERVENTIONS:  - Assess bowel function  - Maintain cher prevention bundle as indicated  Outcome: Progressing     Problem: MUSCULOSKELETAL - ADULT  Goal: Return mobility to safest level of function  Description  INTERVENTIONS:  - Assess patient stability and activity tolerance for standing, transferring and ambu

## 2019-12-16 NOTE — SLP NOTE
ADULT SWALLOWING EVALUATION    ASSESSMENT    ASSESSMENT/OVERALL IMPRESSION:  Patient seen for swallowing evaluation as her mental status has improved. She is alert, answering questions and speaking in shorter phrases today.   She was following commands inc Encephalopathy    • Essential hypertension    • Hyperkalemia    • Renal disorder    • Seborrheic dermatitis of scalp    • Seizure disorder (HCC)     unspecified convusions    • Unspecified behavioral syndromes associated with physiological disturbances and Videofluoroscopic Swallow Study is required to rule-out silent aspiration.)    Esophageal Phase of Swallow: No complaints consistent with possible esophageal involvement              GOALS  Goal #1 Patient will participate in VFSS  In Progress     FOLLOW U

## 2019-12-16 NOTE — PROGRESS NOTES
[facility]                INFECTIOUS DISEASE PROGRESS NOTE    Berhane Monsalve Patient Status:  Inpatient    10/30/1955 MRN [id number]   [facility] 5NW-A Attending Ute Ojeda MD   Hosp Day # 5 PCP Nicki Meek MD     Ant Problem list reviewed:  Patient Active Problem List:     Anemia     Leukocytosis     Azotemia     Metabolic acidosis     Toxic metabolic encephalopathy     Hyperglycemia     Sepsis due to urinary tract infection (HCC)     Hyperkalemia     Clarice Erps failure, unspecified heart failure type (HCC)     Lower extremity edema     Hypokalemia     Stage 4 chronic kidney disease (HCC)     Hypervolemia     Acidosis      ASSESSMENT/PLAN:  1. Aspiration pneumonia, NPO, 02, empiric abx  2.  CDIFF/unable to place do

## 2019-12-16 NOTE — PROGRESS NOTES
12/16/19 0356   BiPAP   $ RT Standby Charge (per 15 min) 1   Device V60   BiPAP / CPAP CE# v60-3   Mode Spontaneous/Timed   Interface Full face mask   Mask Size Large   Control Settings   Set Rate 14 breaths/min   Set IPAP 16   Set EPAP 8   Oxygen Perce

## 2019-12-16 NOTE — DIETARY NOTE
[facility] UP ASSESSMENT    Pt does not meet malnutrition criteria. NUTRITION DIAGNOSIS/PROBLEM:    Inadequate energy intake related to physiological causes as evidenced by NPO-ongoing. NUTRITION INTERVENTION:        1.  Razia Accumulation: 1-2+ BLE edema per MD    NUTRITION PRESCRIPTION: 96 kg Previous BW   Calories: 8009-6969 calories/day (20-25 calories per kg)  Protein: 144-192 grams protein/day (1.5-2 grams protein per kg)  Fluid: ~1 ml/kcal or per MD discretion    MONITOR

## 2019-12-17 ENCOUNTER — APPOINTMENT (OUTPATIENT)
Dept: GENERAL RADIOLOGY | Facility: HOSPITAL | Age: 64
DRG: 177 | End: 2019-12-17
Attending: INTERNAL MEDICINE
Payer: MEDICARE

## 2019-12-17 PROCEDURE — 74230 X-RAY XM SWLNG FUNCJ C+: CPT | Performed by: INTERNAL MEDICINE

## 2019-12-17 PROCEDURE — 99232 SBSQ HOSP IP/OBS MODERATE 35: CPT | Performed by: INTERNAL MEDICINE

## 2019-12-17 RX ORDER — MAGNESIUM SULFATE HEPTAHYDRATE 40 MG/ML
2 INJECTION, SOLUTION INTRAVENOUS ONCE
Status: COMPLETED | OUTPATIENT
Start: 2019-12-17 | End: 2019-12-17

## 2019-12-17 RX ORDER — HYDRALAZINE HYDROCHLORIDE 20 MG/ML
20 INJECTION INTRAMUSCULAR; INTRAVENOUS EVERY 6 HOURS PRN
Status: DISCONTINUED | OUTPATIENT
Start: 2019-12-17 | End: 2019-12-20

## 2019-12-17 NOTE — PLAN OF CARE
Problem: Patient/Family Goals  Goal: Patient/Family Long Term Goal  Description  Patient's Long Term Goal: discharge back to [facility]    Interventions:  - IV abx, palliative care, wean O2 as needed, consults  - See additional Care Plan goals for specific i mobilization and activity  - Obtain nutritional consult as needed  - Establish a toileting routine/schedule  - Consider collaborating with pharmacy to review patient's medication profile  Outcome: Progressing  Goal: Maintains adequate nutritional intake (u equipment as needed  - Ensure adequate protection for wounds/incisions during mobilization  - Obtain PT/OT consults as needed  - Advance activity as appropriate  - Communicate ordered activity level and limitations with patient/family  Outcome: Progressing

## 2019-12-17 NOTE — PLAN OF CARE
Problem: Impaired Swallowing  Goal: Minimize aspiration risk  Description  Interventions:  - Patient should be alert and upright for all feedings (90 degrees preferred)  - Offer food and liquids at a slow rate; 1:1 assistance with feeding  - Encourage sm

## 2019-12-17 NOTE — VIDEO SWALLOW STUDY NOTE
ADULT VIDEOFLUOROSCOPIC SWALLOWING STUDY    Admission Date: 12/11/2019  Evaluation Date: 12/17/19  Radiologist: Dr. Rg Bi: Mechanical soft chopped  Diet Recommendations - Liquid: Honey thick(by tsp only) Pneumonia  Precautions: Aspiration  Imaging results:   CXR from 12/15/19 revealed:  CONCLUSION:    1. Improvement of airspace opacity involving the right lower lobe. Development of a more linear opacity involving the left mid lung.            Dictated by: Thick Liquids): Intact  Bilabial Seal (VFSS - Honey Thick Liquids): Intact  Bolus Formation (VFSS - Honey Thick Liquids): Impaired  Bolus Propulsion (VFSS - Honey Thick Liquids):  Impaired  Triggered at: Valleculae  Laryngeal Penetration: None  Tracheal Asp controlled in the oral to pharyngeal transfer. There was oral retention most notably with liquid trials which did spill to the valleculae and cleared with a secondary swallow.   There was no laryngeal penetration or tracheal aspiration with puree or solid

## 2019-12-17 NOTE — PROGRESS NOTES
[facility]  Nephrology Progress Note    Hanhlouann Payor Patient Status:  Inpatient    10/30/1955 MRN [id number]   [facility] 5NW-A Attending Batsheva Elena MD   Hosp Day # 6 PCP Daphne Allen MD       SUBJECTIVE:  No acute events Daily  cholestyramine light (PREVALITE) powder packet 4 g, 4 g, Oral, Daily  traZODone HCl (DESYREL) tab 50 mg, 50 mg, Oral, Nightly  Insulin Aspart Pen (NOVOLOG) 100 UNIT/ML flexpen 1-5 Units, 1-5 Units, Subcutaneous, TID PC  hydrALAzine HCl (APRESOLINE) Labs     12/17/19  0645   WBC 6.5   HGB 8.5*   MCV 97.9   .0   BAND 4       Recent Labs     12/15/19  0617 12/16/19  0618 12/17/19  0632   * 149* 149*   K 4.2 4.1 4.0   * 123* 122*   CO2 15.0* 18.0* 18.0*   BUN 66* 66* 59*   CREATSERUM 4

## 2019-12-17 NOTE — PROGRESS NOTES
[facility]                INFECTIOUS DISEASE PROGRESS NOTE    Newark Section Patient Status:  Inpatient    10/30/1955 MRN [id number]   [facility] 5NW-A Attending Gemini Lutz MD   Hosp Day # 6 PCP Snehal Stafford MD     Ant 9:11 AM   Result Value Ref Range    Blood Culture Result No Growth 5 Days N/A           Problem list reviewed:  Patient Active Problem List:     Anemia     Leukocytosis     Azotemia     Metabolic acidosis     Toxic metabolic encephalopathy     Hyperglycemi care, counseling/discussion     Candidiasis     Acute on chronic congestive heart failure, unspecified heart failure type ([facility] [address]     Lower extremity edema     Hypokalemia     Stage 4 chronic kidney disease (HCC)     Hypervolemia     Acidosis      ASSESSMENT

## 2019-12-17 NOTE — PROGRESS NOTES
Pt is a 65 y/o female admitted with acute resp failure and AMS Due to pna. Pt Aox1-2, more awake but confused. talking and wants food. VFSS done. 4-6L02 via NC,02 sats >92% , no complaints of pain. no aspiration,seizure and fall precaution.  Pt goes in and out o

## 2019-12-17 NOTE — PROGRESS NOTES
[facility] Pulmonary/Critical Care Progress Note     SUBJECTIVE/Interval history: All events, procedures, notes reviewed. No acute events overnight, tolerated BPAP. Mentation again improved today and conversant.  Denies 124* 123* 122*   CO2 15.0* 18.0* 18.0*     Recent Labs   Lab 12/11/19  0656 12/17/19  0645   RBC 2.35* 2.89*   HGB 7.0* 8.5*   HCT 22.5* 28.3*   MCV 95.7 97.9   MCH 29.8 29.4   MCHC 31.1 30.0*   RDW 15.7* 17.0*   NEPRELIM 10.14* 4.21   WBC 11.6* 6.5   PLT acetylcysteine  3 mL Nebulization TID   • furosemide  40 mg Intravenous BID (Diuretic)   • cefTRIAXone  1 g Intravenous Q24H   • docusate sodium  100 mg Oral BID   • Labetalol HCl  200 mg Oral BID   • calcium acetate  1 capsule Oral BID   • multivitamin  1 ABG  · Follow ABG for appropriate compensation  · Wean o2 for sats goal 88-92%  · Aspiration precautions; HOB elevation, etc    [facility] [address] [facility]

## 2019-12-17 NOTE — SLP NOTE
Preliminary VFSS report:  Patient with laryngeal penetration and tracheal aspiration with nectar thick liquids. No laryngeal penetration or tracheal aspiration with honey thick by tsp and cup, puree and solids. Patient remains impulsive with po.     Recom

## 2019-12-17 NOTE — PLAN OF CARE
Patient alert and oriented x1-2. RA is baseline, req 2L during the day. Bipap at night. Nebs. NS on tele. Hypertensive, prn hydralazine and metoprolol administered. Heparin. Up with total lift. Q2 turns while in bed. Briefed and incontinent. Denies pain.  Venida Lamp Assess and instruct to report SOB or any respiratory difficulty  - Respiratory Therapy support as indicated  - Manage/alleviate anxiety  - Monitor for signs/symptoms of CO2 retention  Outcome: Progressing     Problem: SKIN/TISSUE INTEGRITY - ADULT  Goal: S

## 2019-12-17 NOTE — PROGRESS NOTES
[facility]  Progress Note    Alicia Herr Patient Status:  Inpatient    10/30/1955 MRN [id number]   [facility] 5NW-A Attending Marlon Hurtado MD   [address] Day # 6 PCP Jerilyn Ch MD         SUBJECTIVE:  Subjective:  Paige Pump 12/16/19  1146 12/16/19  1551 12/16/19 2049 12/17/19  0728   PGLU 174* 158* 177* 164* 143*       No results for input(s): URINE, CULTI, BLDSMR in the last 168 hours. Hospital Encounter on 12/11/19   1.  BLOOD CULTURE     Status: None    Collection Time: cc   FINDINGS:   BLADDER:  Normal.       CONCLUSION:  Normal appearing bladder. Prevoid volume 414 cc.     Dictated by: Forrest Denver, MD on 12/08/2019 at 9:56     Approved by: Forrest Denver, MD on 12/08/2019 at 9:58          Us Venous Doppler Leg interstitial alveolar infiltrate the lower 1/2 of the right lung with some new patchy infiltrate in the left lung base laterally and right suprahilar region. Findings are consistent with worsening pneumonia. No significant effusion.       CONCLUSION:  Wor diffuse pulmonary vascular prominence. Stable mild elevation of the right hemidiaphragm. Associated mild subsegmental atelectasis right lung base. CONCLUSION:  1. Suspicion for mild pulmonary vascular congestion and mild pulmonary edema.   2. Stable Leukocytosis     Azotemia     Metabolic acidosis     Toxic metabolic encephalopathy     Hyperglycemia     Sepsis due to urinary tract infection (HCC)     Hyperkalemia     Encephalopathy in sepsis     Sepsis (HCC)     Diabetes (HCC)     CKD (chronic kidney Hypokalemia     Stage 4 chronic kidney disease (HCC)     Hypervolemia     Acidosis    Principal Problem:    Nosocomial pneumonia  Active Problems:    Altered mental status, unspecified altered mental status type    Anemia in stage 5 chronic kidney disease, consultant notes reviewed  Discussed with nursing on floor  dvt prophylaxis reviewed  PT and/or OT  Bhumika Alcala MD

## 2019-12-18 ENCOUNTER — APPOINTMENT (OUTPATIENT)
Dept: GENERAL RADIOLOGY | Facility: HOSPITAL | Age: 64
DRG: 177 | End: 2019-12-18
Attending: INTERNAL MEDICINE
Payer: MEDICARE

## 2019-12-18 ENCOUNTER — APPOINTMENT (OUTPATIENT)
Dept: ULTRASOUND IMAGING | Facility: HOSPITAL | Age: 64
DRG: 177 | End: 2019-12-18
Attending: INTERNAL MEDICINE
Payer: MEDICARE

## 2019-12-18 PROCEDURE — 93971 EXTREMITY STUDY: CPT | Performed by: INTERNAL MEDICINE

## 2019-12-18 PROCEDURE — 99232 SBSQ HOSP IP/OBS MODERATE 35: CPT | Performed by: INTERNAL MEDICINE

## 2019-12-18 PROCEDURE — 71045 X-RAY EXAM CHEST 1 VIEW: CPT | Performed by: INTERNAL MEDICINE

## 2019-12-18 RX ORDER — HYDRALAZINE HYDROCHLORIDE 50 MG/1
50 TABLET, FILM COATED ORAL EVERY 8 HOURS SCHEDULED
Status: DISCONTINUED | OUTPATIENT
Start: 2019-12-18 | End: 2019-12-19

## 2019-12-18 RX ORDER — IPRATROPIUM BROMIDE AND ALBUTEROL SULFATE 2.5; .5 MG/3ML; MG/3ML
3 SOLUTION RESPIRATORY (INHALATION)
Status: DISCONTINUED | OUTPATIENT
Start: 2019-12-18 | End: 2019-12-20

## 2019-12-18 RX ORDER — POTASSIUM CHLORIDE 29.8 MG/ML
40 INJECTION INTRAVENOUS ONCE
Status: COMPLETED | OUTPATIENT
Start: 2019-12-18 | End: 2019-12-18

## 2019-12-18 RX ORDER — MAGNESIUM SULFATE HEPTAHYDRATE 40 MG/ML
2 INJECTION, SOLUTION INTRAVENOUS ONCE
Status: COMPLETED | OUTPATIENT
Start: 2019-12-18 | End: 2019-12-18

## 2019-12-18 RX ORDER — LEVETIRACETAM 500 MG/1
500 TABLET ORAL 2 TIMES DAILY
Status: DISCONTINUED | OUTPATIENT
Start: 2019-12-18 | End: 2019-12-20

## 2019-12-18 RX ORDER — LEVETIRACETAM 500 MG/1
500 TABLET ORAL 2 TIMES DAILY
Status: DISCONTINUED | OUTPATIENT
Start: 2019-12-18 | End: 2019-12-18

## 2019-12-18 RX ORDER — ACETYLCYSTEINE 200 MG/ML
3 SOLUTION ORAL; RESPIRATORY (INHALATION) 2 TIMES DAILY
Status: DISCONTINUED | OUTPATIENT
Start: 2019-12-18 | End: 2019-12-18

## 2019-12-18 NOTE — PROGRESS NOTES
[facility] Note: Route Optimization for Levetiracetam (KEPPRA)    Patient is currently on Levetiracetam (KEPPRA) 500 mg IV every 12 hours.    The patient meets the criteria to convert to the oral equivalent as established by the IV to Oral conversion prot

## 2019-12-18 NOTE — PROGRESS NOTES
[facility]  Progress Note    [facility] Patient Status:  Inpatient    10/30/1955 MRN [id number]   [facility] 5NW-A Attending Edis Velazco MD   [facility] Day # 7 PCP Pedro Moctezuma MD         SUBJECTIVE:  Subjective:  Gabrielle Singleton Recent Labs   Lab 12/17/19  0728 12/17/19  1113 12/17/19  1602 12/17/19  2128 12/18/19  0739   PGLU 143* 156* 215* 245* 251*       No results for input(s): URINE, CULTI, BLDSMR in the last 168 hours. Hospital Encounter on 12/11/19   1.  BLOOD CULTU Prevoid Bladder Volume:  414 cc   FINDINGS:   BLADDER:  Normal.       CONCLUSION:  Normal appearing bladder. Prevoid volume 414 cc.     Dictated by: Sherlyn Craft MD on 12/08/2019 at 9:56     Approved by: Sherlyn Craft MD on 12/08/2019 a interstitial edema. Worsening mixed interstitial alveolar infiltrate the lower 1/2 of the right lung with some new patchy infiltrate in the left lung base laterally and right suprahilar region. Findings are consistent with worsening pneumonia.   No signif edema can also not be excluded with mild diffuse pulmonary vascular prominence. Stable mild elevation of the right hemidiaphragm. Associated mild subsegmental atelectasis right lung base. CONCLUSION:  1.  Suspicion for mild pulmonary vascular congest Metabolic acidosis     Toxic metabolic encephalopathy     Hyperglycemia     Sepsis due to urinary tract infection (HCC)     Hyperkalemia     Encephalopathy in sepsis     Sepsis (HCC)     Diabetes (HCC)     CKD (chronic kidney disease) stage 3, GFR 30-59 ml kidney disease ([facility]     Hypervolemia     Acidosis    Principal Problem:    Nosocomial pneumonia  Active Problems:    Altered mental status, unspecified altered mental status type    Anemia in stage 5 chronic kidney disease, not on chronic dialysis ([facility] with nursing on floor  dvt prophylaxis reviewed  PT and/or OT  Osmani Hdez MD

## 2019-12-18 NOTE — SLP NOTE
SPEECH DAILY NOTE - INPATIENT    ASSESSMENT & PLAN   ASSESSMENT  Pt seen for dysphagia tx to assess tolerance with recommended diet, ensure proper utilization of aspiration precautions and provide pt/family education.   Patient alert and much improved clini liquids/solids, No straws, Upright 90 degrees, Liquids via tsp amount only, Eliminate distractions, Feed patient with max assistance 100 % of the time across 2 sessions.   In Progress     FOLLOW UP  Follow Up Needed: Yes  SLP Follow-up Date: 12/19/19  Avis

## 2019-12-18 NOTE — PROGRESS NOTES
[facility]  Nephrology Progress Note    Joy Cutler Patient Status:  Inpatient    10/30/1955 MRN [id number]   [facility] 5NW-A Attending Ananya Coley MD   Hosp Day # 7 PCP Lennox Ahr, MD       SUBJECTIVE:  No complains; c Units, Subcutaneous, Nightly  Umeclidinium Bromide (INCRUSE ELLIPTA) 62.5 MCG/INH inhaler 1 puff, 1 puff, Inhalation, Daily  cholestyramine light (PREVALITE) powder packet 4 g, 4 g, Oral, Daily  traZODone HCl (DESYREL) tab 50 mg, 50 mg, Oral, Nightly  Insu 97.9 96.8   .0 218.0   BAND 4 1       Recent Labs     12/16/19  0618 12/17/19  0632 12/18/19  0630   * 149* 144   K 4.1 4.0 3.6   * 122* 112   CO2 18.0* 18.0* 20.0*   BUN 66* 59* 53*   CREATSERUM 4.54* 4.17* 3.77*   CA 8.0* 8.0* 7.5*   M

## 2019-12-18 NOTE — PROGRESS NOTES
[facility] Pulmonary/Critical Care Progress Note     SUBJECTIVE/Interval history: All events, procedures, notes reviewed. No acute events overnight, tolerated BPAP.  Mentation continue to improve and more alert today, co BUN 66* 59* 53*   CREATSERUM 4.54* 4.17* 3.77*   GFRAA 11* 12* 14*   GFRNAA 10* 11* 12*   CA 8.0* 8.0* 7.5*   * 149* 144   K 4.1 4.0 3.6   * 122* 112   CO2 18.0* 18.0* 20.0*     Recent Labs   Lab 12/17/19  0645 12/18/19  0630   RBC 2.89* 2.53 barium were administered including nectar by spoon and cup, honey by spoon and cup, pudding by spoon, and cracker with barium. There is both laryngeal penetration and aspiration with nectar by cup.   PLEASE SEE SPEECH PATHOLOGIST REPORT FOR FULL ASSESSMENT • Fluticasone Furoate-Vilanterol  1 puff Inhalation Daily   • escitalopram  10 mg Oral Daily   • insulin detemir  5 Units Subcutaneous Nightly   • Umeclidinium Bromide  1 puff Inhalation Daily   • cholestyramine light  4 g Oral Daily   • traZODone HCl  5

## 2019-12-18 NOTE — PLAN OF CARE
Problem: Patient/Family Goals  Goal: Patient/Family Long Term Goal  Description  Patient's Long Term Goal: discharge back to [facility]    Interventions:  - IV abx, palliative care, wean O2 as needed, consults  - See additional Care Plan goals for specific i PO as ordered and tolerated  - Evaluate effectiveness of GI medications  - Encourage mobilization and activity  - Obtain nutritional consult as needed  - Establish a toileting routine/schedule  - Consider collaborating with pharmacy to review patient's med dressing/incision, wound bed, drain sites and surrounding tissue  - Implement wound care per orders  - Initiate isolation precautions as appropriate  - Initiate Pressure Ulcer prevention bundle as indicated  Outcome: Progressing     Problem: Elvis Hernandez

## 2019-12-18 NOTE — PROGRESS NOTES
Pt is a 67 y/o female admitted with acute resp failure and AMS Due to pna. Pt Aox1-2, more awake but confused. talking and wants food. VFSS done. 4-6L02 via NC,02 sats >92% , no complaints of pain. no aspiration,seizure and fall precaution.  Pt goes in and out o

## 2019-12-18 NOTE — CM/SW NOTE
SW reviewed pt's chart. Clinicals updates sent to [address] [place] [address]. At this time [facility] has been revoked. If it is needed at dc a hospice order will need to be placed. Confirm dc plan w/state guardian prior to dc.

## 2019-12-18 NOTE — PLAN OF CARE
Patient alert and oriented  x4 today, asking questions bout her meds and explaining what she takes them for. Received pt on 6L high flow. Weaned to RA where she maintained her o2 sats around 95%. Bipap at night. Nebs. NS on tele.  Hypertensive, prn hydralaz Spirometry  - Assess the need for suctioning and perform as needed  - Assess and instruct to report SOB or any respiratory difficulty  - Respiratory Therapy support as indicated  - Manage/alleviate anxiety  - Monitor for signs/symptoms of CO2 retention  Hannah Ugalde pressure ulcer development  - Assess and document skin integrity  - Monitor for areas of redness and/or skin breakdown  - Initiate interventions, skin care algorithm/standards of care as needed  Outcome: Progressing

## 2019-12-18 NOTE — PROGRESS NOTES
12/18/19 0358   BiPAP   $ RT Standby Charge (per 15 min) 1   Device V60   BiPAP / CPAP CE# [id number]   Mode Spontaneous   Interface Full face mask   Mask Size Large   Control Settings   Set Rate 20 breaths/min   Set IPAP 20   Set EPAP 5   Oxygen Percent 30

## 2019-12-18 NOTE — PROGRESS NOTES
[facility]                INFECTIOUS DISEASE PROGRESS NOTE    [place] Patient Status:  Inpatient    10/30/1955 MRN [id number]   [facility] 5NW-A Attending Gemini Lutz MD   Hosp Day # 7 PCP Snehal Stafford MD     Ant Leukocytosis     Azotemia     Metabolic acidosis     Toxic metabolic encephalopathy     Hyperglycemia     Sepsis due to urinary tract infection (HCC)     Hyperkalemia     Encephalopathy in sepsis     Sepsis (HCC)     Diabetes (HCC)     CKD (chronic kidney Hypokalemia     Stage 4 chronic kidney disease (HCC)     Hypervolemia     Acidosis      ASSESSMENT/PLAN:  1. Aspiration pneumonia, improved  Completed ivvabx  2. CDIFF/po vanc x 2 weeks  3.  HO CVA        Razia Medeiros MD  [facility]

## 2019-12-19 ENCOUNTER — APPOINTMENT (OUTPATIENT)
Dept: GENERAL RADIOLOGY | Facility: HOSPITAL | Age: 64
DRG: 177 | End: 2019-12-19
Attending: INTERNAL MEDICINE
Payer: MEDICARE

## 2019-12-19 PROCEDURE — 71045 X-RAY EXAM CHEST 1 VIEW: CPT | Performed by: INTERNAL MEDICINE

## 2019-12-19 PROCEDURE — 99232 SBSQ HOSP IP/OBS MODERATE 35: CPT | Performed by: INTERNAL MEDICINE

## 2019-12-19 RX ORDER — HEPARIN SODIUM 5000 [USP'U]/ML
5000 INJECTION, SOLUTION INTRAVENOUS; SUBCUTANEOUS EVERY 12 HOURS
Qty: 60 SYRINGE | Refills: 0 | Status: SHIPPED | OUTPATIENT
Start: 2019-12-19 | End: 2019-12-20

## 2019-12-19 RX ORDER — TOBRAMYCIN 3 MG/ML
2 SOLUTION/ DROPS OPHTHALMIC 2 TIMES DAILY
Qty: 1 BOTTLE | Refills: 0 | Status: SHIPPED | OUTPATIENT
Start: 2019-12-19 | End: 2019-12-29

## 2019-12-19 RX ORDER — POTASSIUM CHLORIDE 20 MEQ/1
20 TABLET, EXTENDED RELEASE ORAL DAILY
Status: DISCONTINUED | OUTPATIENT
Start: 2019-12-19 | End: 2019-12-20

## 2019-12-19 RX ORDER — HYDRALAZINE HYDROCHLORIDE 25 MG/1
75 TABLET, FILM COATED ORAL EVERY 8 HOURS SCHEDULED
Refills: 0 | Status: SHIPPED | COMMUNITY
Start: 2019-12-19

## 2019-12-19 RX ORDER — SODIUM BICARBONATE 650 MG/1
1300 TABLET ORAL 2 TIMES DAILY
Refills: 0 | Status: SHIPPED | COMMUNITY
Start: 2019-12-19

## 2019-12-19 RX ORDER — POTASSIUM CHLORIDE 20 MEQ/1
20 TABLET, EXTENDED RELEASE ORAL DAILY
Refills: 0 | Status: SHIPPED | COMMUNITY
Start: 2019-12-20

## 2019-12-19 RX ORDER — TOBRAMYCIN 3 MG/ML
2 SOLUTION/ DROPS OPHTHALMIC 2 TIMES DAILY
Status: DISCONTINUED | OUTPATIENT
Start: 2019-12-19 | End: 2019-12-20

## 2019-12-19 RX ORDER — LEVETIRACETAM 500 MG/1
500 TABLET ORAL 2 TIMES DAILY
Qty: 60 TABLET | Refills: 0 | Status: SHIPPED | OUTPATIENT
Start: 2019-12-19 | End: 2019-12-20

## 2019-12-19 NOTE — CM/SW NOTE
BRAYDON called and left a voice mail for state guardian Trinidad Pastures ([phone])  re:palliative care Julia Brewster DC plan. [facility] order has been revoked.     ADDENDUM 12.19.19 3:02 PM      BRAYDON left another voice mail for Trinidad Pastures at [phone]

## 2019-12-19 NOTE — PLAN OF CARE
Pt AOx3. VSS on RA. Bipap prn. Tele, NSR. Generalized +2 edema. new orbital edema w/ yellow discharge, md aware. Briefed/incontinent. [place]. Denies pain. Total lift. Contracted BLE and BUE. Iv abx. Po vanco. Saline locked.  [facility], Incentive Spirom[facility]  - Assess the need for suctioning and perform as needed  - Assess and instruct to report SOB or any respiratory difficulty  - Respiratory Therapy support as indicated  - Manage/alleviate anxiety  - Monitor for signs/symptoms o risk factors for pressure ulcer development  - Assess and document skin integrity  - Monitor for areas of redness and/or skin breakdown  - Initiate interventions, skin care algorithm/standards of care as needed  Outcome: Progressing  Goal: Incision(s), wou

## 2019-12-19 NOTE — PROGRESS NOTES
[facility] Pulmonary/Critical Care Progress Note     SUBJECTIVE/Interval history: All events, procedures, notes reviewed. No acute events overnight. Did not use Bipap overnight. Using prn. ABG is ok today.    Mentation Lab Data Review:   Recent Labs   Lab 12/17/19  0632 12/18/19  0630 12/19/19  0646   * 243* 144*   BUN 59* 53* 52*   CREATSERUM 4.17* 3.77* 3.71*   GFRAA 12* 14* 14*   GFRNAA 11* 12* 12*   CA 8.0* 7.5* 7.7*   * 144 143   K 4.0 3.6 3.7   CL None.  INDICATIONS:  history of aspiration pneumonia; coughing with po  PATIENT STATED HISTORY: (As transcribed by Technologist)  History of aspiration and coughing with eating.    CINE CAPTURES:  6 FLUORSCOPY TIME:  1minute 11seconds RADIATION DOSE (AIR KE epoetin keeley-epbx  10,000 Units Subcutaneous Weekly   • vancomycin HCl  125 mg Oral Q6H   • sodium bicarbonate  1,300 mg Per G Tube TID   • furosemide  40 mg Intravenous BID (Diuretic)   • docusate sodium  100 mg Oral BID   • Labetalol HCl  200 mg Oral BID o2 for sats goal 88-92%  · Aspiration precautions; HOB elevation, etc  · Discharge [id number] Satinder Padgett MD  [facility]

## 2019-12-19 NOTE — SLP NOTE
SPEECH DAILY NOTE - INPATIENT    ASSESSMENT & PLAN   ASSESSMENT  Pt seen for dysphagia tx to assess tolerance with recommended diet, ensure proper utilization of aspiration precautions and provide pt/family education. Patient alert and up in bed.   RN pres only, Eliminate distractions, Feed patient with max assistance 100 % of the time across 2 sessions.  Met     FOLLOW UP  Follow Up Needed: No  SLP Follow-up Date: 12/19/19  Number of Visits to Meet Established Goals: 2    Session: 2 of 2    If you have any

## 2019-12-19 NOTE — PLAN OF CARE
Problem: Patient/Family Goals  Goal: Patient/Family Long Term Goal  Description  Patient's Long Term Goal: discharge back to [facility]    Interventions:  - IV abx, palliative care, wean O2 as needed, consults  - See additional Care Plan goals for specific i function  - Maintain adequate hydration with IV or PO as ordered and tolerated  - Evaluate effectiveness of GI medications  - Encourage mobilization and activity  - Obtain nutritional consult as needed  - Establish a toileting routine/schedule  - Consider skin integrity  - Assess and document dressing/incision, wound bed, drain sites and surrounding tissue  - Implement wound care per orders  - Initiate isolation precautions as appropriate  - Initiate Pressure Ulcer prevention bundle as indicated  Outcome: P Pt  updated with plan of care.

## 2019-12-19 NOTE — DISCHARGE SUMMARY
[facility]  Discharge Summary    Jayde Galeana Patient Status:  Inpatient    10/30/1955 MRN [id number]   [facility] 5NW-A Attending Kellee Pierre MD   [address] Day # 8 PCP Cari Schilling MD     Date of Admission: 2019    Date Palliative care encounter     Counseling regarding advance care planning and goals of care     Acute renal failure (ARF) ([facility]     Acute kidney injury ([facility]     Chronic renal failure, stage 4 (severe) (HCC)     Weakness generalized     Anemia in stage 5 ch out cva  TECHNIQUE:  Noncontrast CT scanning is performed through the brain. Dose reduction techniques were used.  Dose information is transmitted to the ACR ([facility] [employer] [facility]) [place] ([address]) which includes the Dose Ind two-dimensional images of the vascular structures, Doppler spectral analysis, and color flow. Doppler imaging were performed.   The following veins were imaged:  Subclavian, Jugular, Axillary, Brachial, Basilic, Cephalic, and the contralateral Subclavian a pathologist.  Barium of varying consistencies was administered orally with patient in lateral projection. COMPARISON:  None.   INDICATIONS:  history of aspiration pneumonia; coughing with po  PATIENT STATED HISTORY: (As transcribed by Technologist)  Histor Portable  (cpt=71045)    Result Date: 12/18/2019  PROCEDURE:  XR CHEST AP PORTABLE  (CPT=71045)  TECHNIQUE:  AP chest radiograph was obtained. COMPARISON:  YESY , XR, XR CHEST AP PORTABLE  (CPT=71045), 12/16/2019, 12:25.   INDICATIONS:  hypoxia  PATIENT opacity involving the left mid lung.     Dictated by: Donald Person MD on 12/16/2019 at 13:39     Approved by: Donald Person MD on 12/16/2019 at 13:41          Xr Chest Ap Portable  (cpt=71045)    Result Date: 12/15/2019  PROCEDURE:  XR CHEST AP PORTABLE  (CPT=71 left lung base laterally and right suprahilar region. Findings are consistent with worsening pneumonia. No significant effusion. CONCLUSION:  Worsening pneumonia.     Dictated by: Sushil Mcbride MD on 12/12/2019 at 7:26     Approved by: Thong Mann mild subsegmental atelectasis right lung base. CONCLUSION:  1. Suspicion for mild pulmonary vascular congestion and mild pulmonary edema. 2. Stable mild cardiomegaly. Dictated by: Perri Kowalski DO on 12/03/2019 at 16:06     Approved by:  Perri Kowalski 4.0 3.6 3.7   * 123* 122* 112 111   CO2 15.0* 18.0* 18.0* 20.0* 24.0   BUN 66* 66* 59* 53* 52*   CREATSERUM 4.76* 4.54* 4.17* 3.77* 3.71*   CA 8.2* 8.0* 8.0* 7.5* 7.7*   MG  --  1.9 1.7 1.7 2.0   * 181* 154* 243* 144*             Recent Labs 12/8/2019  PROCEDURE:  US BLADDER ONLY (YDI=47361)  COMPARISON:  US [place] KIDNEY/BLADDER (CPT=76770), 4/07/2019, 14:48. INDICATIONS:  retention  TECHNIQUE:  Transabdominal gray scale ultrasound imaging of the bladder was performed.   Routine techniq (CPT=71045), 12/03/2019, 15:48. INDICATIONS:  lung aeration  PATIENT STATED HISTORY: (As transcribed by Technologist)  Patient offered no additional history at this time. FINDINGS:  The lung volumes are again noted to be low.   There is stable elevation EDWARD , XR, XR CHEST AP PORTABLE  (CPT=71045), 10/24/2019, 21:24. INDICATIONS:  Bilateral lower ext edema  PATIENT STATED HISTORY: (As transcribed by Technologist)  Patient offered no additional history at this time.     FINDINGS:  There is mild cardiome diphenhydrAMINE HCl, diphenhydrAMINE HCl, ondansetron HCl, metoprolol Tartrate, ipratropium-albuterol         Assessment/Plan:   Patient Active Problem List:     Anemia     Leukocytosis     Azotemia     Metabolic acidosis     Toxic metabolic encephalopathy ([facility]     Goals of care, counseling/discussion     Candidiasis     Acute on chronic congestive heart failure, unspecified heart failure type ([facility]     Lower extremity edema     Hypokalemia     Stage 4 chronic kidney disease ([facility]     Hypervolemia     Acido escalation of care/ treatment. He acknowledged her treatment options are limited.  Her code status remains full code.      Per sw-   At this time [facility] has been revoked. If it is needed at [place] a hospice order will need to be placed.  Confirm dc daily.    Citalopram Hydrobromide 20 MG Oral Tab  Take 20 mg by mouth daily. triamcinolone acetonide 0.1 % External Cream  Apply topically daily. insulin detemir 100 UNIT/ML Subcutaneous Solution Pen-injector  Inject 5 Units into the skin nightly.   Q 325 MG Oral Tab  Take 650 mg by mouth every 6 (six) hours as needed for Pain. bisacodyl (DULCOLAX) 10 MG Rectal Suppos  Place 10 mg rectally daily as needed. Follow up Visits:  Follow-up with all MD's as per their recommendation/ refer to [facility]

## 2019-12-19 NOTE — PROGRESS NOTES
Goal: To achieve optimal ventilation and oxygenation.     INTERVENTIONS:  • Assess for changes in respiratory status  • Assess for changes in mentation and behavior  • Position to facilitate oxygenation and minimize respiratory effort  • Oxygen supplementat rate metaneb   Metaneb / Mask CPAP Pressure 15   Delivery device Mask   Duration (minutes) 10 min   Respiratory Pattern Regular   Breath Sounds Pre-Treatment Bilateral Diminished

## 2019-12-19 NOTE — PROGRESS NOTES
[facility]  Progress Note    Marta Lesstaty Patient Status:  Inpatient    10/30/1955 MRN [id number]   [facility] 5NW-A Attending Orestes Kasper MD   [facility] Day # 8 PCP Meaghan Muñoz MD         SUBJECTIVE:  Subjective:  Yoselin Caldera 12/19/19  0748   PGLU 251* 306* 278* 229* 157*       No results for input(s): URINE, CULTI, BLDSMR in the last 168 hours. Hospital Encounter on 12/11/19   1.  BLOOD CULTURE     Status: None    Collection Time: 12/11/19  9:11 AM   Result Value Ref Range CONCLUSION:  Normal appearing bladder. Prevoid volume 414 cc.     Dictated by: Sushil Mcbride MD on 12/08/2019 at 9:56     Approved by: Sushil Mcbride MD on 12/08/2019 at 9:58          Us Venous Doppler Leg Bilat - Diag Img (cpt=93970)    Resul lower 1/2 of the right lung with some new patchy infiltrate in the left lung base laterally and right suprahilar region. Findings are consistent with worsening pneumonia. No significant effusion. CONCLUSION:  Worsening pneumonia.     Dictated by: Abdiel Martinez prominence. Stable mild elevation of the right hemidiaphragm. Associated mild subsegmental atelectasis right lung base. CONCLUSION:  1. Suspicion for mild pulmonary vascular congestion and mild pulmonary edema. 2. Stable mild cardiomegaly.   Dictate 3, GFR 30-59 ml/min (HCC)     AHSAN (acute kidney injury) ([facility]     History of seizure     Cognitive impairment     Acute delirium     Severe episode of recurrent major depressive disorder, without psychotic features ([facility]     UTI (urinary tract infection), dialysis [facility])    Stage 4 chronic kidney disease (HCC)    Hypervolemia    Acidosis          Plan:  Continue present management,    Encephalopathy/seizures/history of spinal cord injury,- per neuro/supportive care    Renal failure stage V at this point no h Jacinta Kenyon MD

## 2019-12-19 NOTE — PROGRESS NOTES
[facility]                INFECTIOUS DISEASE PROGRESS NOTE    Marta Cole Patient Status:  Inpatient    10/30/1955 MRN [id number]   [facility] 5NW-A Attending Orestes Kasper MD   Hosp Day # 8 PCP Meaghan Muñoz MD     Ant reviewed:  Patient Active Problem List:     Anemia     Leukocytosis     Azotemia     Metabolic acidosis     Toxic metabolic encephalopathy     Hyperglycemia     Sepsis due to urinary tract infection (HCC)     Hyperkalemia     Encephalopathy in sepsis     S heart failure type (HCC)     Lower extremity edema     Hypokalemia     Stage 4 chronic kidney disease (HCC)     Hypervolemia     Acidosis      ASSESSMENT/PLAN:  1. Aspiration pneumonia, improved  Completed ivvabx    2.  CDIFF, diarrhea resolved on oral vanc

## 2019-12-19 NOTE — PROGRESS NOTES
[facility]  Nephrology Progress Note    [facility]	[place] Patient Status:  Inpatient    10/30/1955 MRN [id number]   [facility] 5NW-A Attending Alysha Wayne MD   Hosp Day # 8 PCP Pamela Marques MD       SUBJECTIVE:  No acute events Inhalation, Daily  cholestyramine light (PREVALITE) powder packet 4 g, 4 g, Oral, Daily  traZODone HCl (DESYREL) tab 50 mg, 50 mg, Oral, Nightly  Insulin Aspart Pen (NOVOLOG) 100 UNIT/ML flexpen 1-5 Units, 1-5 Units, Subcutaneous, TID PC  diphenhydrAMINE H CO2 18.0* 20.0* 24.0   BUN 59* 53* 52*   CREATSERUM 4.17* 3.77* 3.71*   CA 8.0* 7.5* 7.7*   MG 1.7 1.7 2.0       Recent Labs     12/17/19  0632   ALT 12*   AST 9*   ALB 2.1*     Impression/Plan:      1.   CKD V- has been longstanding and progressive due t

## 2019-12-19 NOTE — PROGRESS NOTES
Multidisciplinary Discharge Rounds held 12/19/2019. Treatment team members present today include , , Charge Nurse, Nurse, RT, PT and Pharmacy caring for [facility].      Other care providers present:    Mobility Goal:

## 2019-12-20 VITALS
WEIGHT: 216.5 LBS | DIASTOLIC BLOOD PRESSURE: 48 MMHG | SYSTOLIC BLOOD PRESSURE: 147 MMHG | HEIGHT: 65 IN | BODY MASS INDEX: 36.07 KG/M2 | HEART RATE: 70 BPM | TEMPERATURE: 98 F | OXYGEN SATURATION: 96 % | RESPIRATION RATE: 18 BRPM

## 2019-12-20 PROCEDURE — 99232 SBSQ HOSP IP/OBS MODERATE 35: CPT | Performed by: INTERNAL MEDICINE

## 2019-12-20 RX ORDER — LEVETIRACETAM 500 MG/1
500 TABLET ORAL 2 TIMES DAILY
Qty: 60 TABLET | Refills: 0 | Status: SHIPPED | OUTPATIENT
Start: 2019-12-20

## 2019-12-20 RX ORDER — HEPARIN SODIUM 5000 [USP'U]/ML
5000 INJECTION, SOLUTION INTRAVENOUS; SUBCUTANEOUS EVERY 12 HOURS
Qty: 60 SYRINGE | Refills: 0 | Status: SHIPPED | OUTPATIENT
Start: 2019-12-20

## 2019-12-20 RX ORDER — IPRATROPIUM BROMIDE AND ALBUTEROL SULFATE 2.5; .5 MG/3ML; MG/3ML
3 SOLUTION RESPIRATORY (INHALATION)
Status: DISCONTINUED | OUTPATIENT
Start: 2019-12-20 | End: 2019-12-20

## 2019-12-20 NOTE — CM/SW NOTE
SW received a call from state guardian Sophy Yost after hours on 12.20.19. SW called again and left a voice mail asking for a call back to discuss hospice and code status on 12.20.19. at 11:  AM        ADDENDUM 12.20.19 11:15 AM    SW received a call rubio

## 2019-12-20 NOTE — PROGRESS NOTES
Multidisciplinary Discharge Rounds held 12/20/2019. Treatment team members present today include , , Charge Nurse, Nurse, RT, PT and Pharmacy caring for [facility].      Other care providers present:    Mobility Goal:

## 2019-12-20 NOTE — PROGRESS NOTES
[facility]                INFECTIOUS DISEASE PROGRESS NOTE    Galileo Shepherd Patient Status:  Inpatient    10/30/1955 MRN [id number]   [facility] 5NW-A Attending Farideh Martinez MD   Hosp Day # 9 PCP Josseline Fox MD     Ant Collection Time: 12/11/19  9:11 AM   Result Value Ref Range    Blood Culture Result No Growth 5 Days N/A           Problem list reviewed:  Patient Active Problem List:     Anemia     Leukocytosis     Azotemia     Metabolic acidosis     Toxic metabolic ence dialysis [facility])     Goals of care, counseling/discussion     Candidiasis     Acute on chronic congestive heart failure, unspecified heart failure type ([facility]     Lower extremity edema     Hypokalemia     Stage 4 chronic kidney disease (HCC)     Hypervolemia

## 2019-12-20 NOTE — PROGRESS NOTES
[facility]  Progress Note    Galileo Shepherd Patient Status:  Inpatient    10/30/1955 MRN [id number]   [facility] 5NW-A Attending Farideh Martinez MD   [facility] Day # 9 PCP Josseline Fox MD     Subjective:  Galileo Shepherd is a(n) 12/19/19  0706   ABGPHT 7.39   GCQDVC6H 40   HVKQZ4N 75*   ABGHCO3 23.4   ABGBE -1.5   TEMP 98.3   NITA Positive   SITE Left Radial   DEV Room Air   THGB 9.4*       Invalid input(s): CKTOTAL, TROPONINI, TROPONINT, CKMBINDEX    Cultures:  +c diff    Radiol UNIT/ML flextouch 5 Units, 5 Units, Subcutaneous, Nightly  Umeclidinium Bromide (INCRUSE ELLIPTA) 62.5 MCG/INH inhaler 1 puff, 1 puff, Inhalation, Daily  cholestyramine light (PREVALITE) powder packet 4 g, 4 g, Oral, Daily  traZODone HCl (DESYREL) tab 50 m

## 2019-12-20 NOTE — CM/SW NOTE
Spoke with ELLE Prince at [facility] and she stated that they can take her back on hospice and full code as long as they have the equipment she needs and parameters for care/treatment. Updated Shirlene.

## 2019-12-20 NOTE — PROGRESS NOTES
[facility]  Progress Note    Homero Flores Patient Status:  Inpatient    10/30/1955 MRN [id number]   [facility] 5NW-A Attending Courtney Lemus MD   [facility] Day # 9 PCP Stefan Kim MD         SUBJECTIVE:  Subjective:  Vanda Sanchez 12/19/19  0646 12/20/19  0702   WBC 6.5 5.9 6.8 7.7   HGB 8.5* 7.4* 7.8* 8.1*   MCV 97.9 96.8 97.0 97.8   .0 218.0 226.0 211.0   BAND 4 1  --  1       Recent Labs   Lab 12/16/19  0618 12/17/19  8542 12/18/19  0630 12/19/19  0646 12/20/19  0702   NA unremarkable. Visualized portions of orbits are unremarkable. IMPRESSION: Unremarkable CT head.    Dictated by: Missy Grace MD on 12/04/2019 at 13:36     Approved by: Missy Grace MD on 12/04/2019 at 13:38          Us Bladder Only (WMY=95426) Chest Ap Portable  (cpt=71045)    Result Date: 12/12/2019  PROCEDURE:  XR CHEST AP PORTABLE  (CPT=71045)  TECHNIQUE:  AP chest radiograph was obtained. COMPARISON:  [facility] , XR, XR CHEST AP PORTABLE  (CPT=71045), 12/11/2019, 7:08.   [facility] , XR, XR CHEST A Mark Currie MD on 12/11/2019 at 7:25     Approved by: Sandie Morales MD on 12/11/2019 at 7:26          Xr Chest Ap Portable  (cpt=71045)    Result Date: 12/3/2019  PROCEDURE:  XR CHEST AP PORTABLE  (CPT=71045)  TECHNIQUE:  AP chest radiograph was obtained.   CO Nightly   • Umeclidinium Bromide  1 puff Inhalation Daily   • cholestyramine light  4 g Oral Daily   • traZODone HCl  50 mg Oral Nightly   • Insulin Aspart Pen  1-5 Units Subcutaneous TID PC   • Heparin Sodium (Porcine)  5,000 Units Subcutaneous Q12H Palliative care encounter     Counseling regarding advance care planning and goals of care     Acute renal failure (ARF) ([facility] [address]     Acute kidney injury ([facility] [address]     Chronic renal failure, stage 4 (severe) (HCC)     Weakness generalized     Anemia in stage 5 ch based on all mds 's recommendation. Oleg Ballesteros stated he is comfortable with having hospice support for Ja Burnham although is not willing to change her code status.   Per PC apn-After discussing with Hospice/ [place] and Kenneth/ guardian while Ja Burnham is hospi

## 2019-12-20 NOTE — CM/SW NOTE
Left message for state guardian to determine if he continues to want pt to go back with hospice . provided call back number.

## 2019-12-20 NOTE — PLAN OF CARE
Problem: Patient/Family Goals  Goal: Patient/Family Long Term Goal  Description  Patient's Long Term Goal: discharge back to [facility]    Interventions:  - IV abx, palliative care, wean O2 as needed, consults  - See additional Care Plan goals for specific i function  Description  INTERVENTIONS:  - Assess bowel function  - Maintain adequate hydration with IV or PO as ordered and tolerated  - Evaluate effectiveness of GI medications  - Encourage mobilization and activity  - Obtain nutritional consult as needed for pressure ulcer development  - Assess and document skin integrity  - Assess and document dressing/incision, wound bed, drain sites and surrounding tissue  - Implement wound care per orders  - Initiate isolation precautions as appropriate  - Initiate Pre chopped food with honey thick liquid. Pressure  Ulcer wound stage 2 in scacral area and dressing changed . ABG scheduled at AM. WCSTEPHEN. Pt  updated with plan of care.

## 2019-12-20 NOTE — PROGRESS NOTES
[facility]  Nephrology Progress Note    Dorota Murillo Patient Status:  Inpatient    10/30/1955 MRN [id number]   [facility] 5NW-A Attending Alysha Wayne MD   Hosp Day # 9 PCP Pameal Marques MD       SUBJECTIVE:  Plan for d/c to Subcutaneous, Nightly  Umeclidinium Bromide (INCRUSE ELLIPTA) 62.5 MCG/INH inhaler 1 puff, 1 puff, Inhalation, Daily  cholestyramine light (PREVALITE) powder packet 4 g, 4 g, Oral, Daily  traZODone HCl (DESYREL) tab 50 mg, 50 mg, Oral, Nightly  Insulin Asp 12/19/19  0646 12/20/19  0702    143 139   K 3.6 3.7 4.3    111 109   CO2 20.0* 24.0 22.0   BUN 53* 52* 52*   CREATSERUM 3.77* 3.71* 3.77*   CA 7.5* 7.7* 7.8*   MG 1.7 2.0  --        Recent Labs     12/20/19  0702   ALT 6*   AST 4*   ALB 2.0*

## 2019-12-21 NOTE — PROGRESS NOTES
NURSING DISCHARGE NOTE    Discharged Nursing home via Ambulance. Accompanied by Support staff  Belongings Taken by patient/family. Discharge paperwork and prescriptions sent with ambulance. PIV removed. Report given to [place] at [place].  Pt denies a

## 2019-12-27 NOTE — CDS QUERY
Clarification – Pulmonary Edema  CLINICAL DOCUMENTATION CLARIFICATION FORM  Clinical information (provided below) includes documentation of Pulmonary Edema.  For accurate ICD-10-CM code assignment to reflect severity of illness and risk of mortality,    P

## 2021-08-27 NOTE — PROGRESS NOTES
[facility] Pharmacy Note:  Renal Dose Adjustment for Metoclopramide (REGLAN)    Елена Peters has been prescribed Metoclopramide (REGLAN) 10 mg every 8 hours as needed for N/V.     Estimated Creatinine Clearance: 12.5 mL/min (A) (based on SCr of 3.39 mg/dL ( within functional limits

## 2022-09-27 NOTE — PROGRESS NOTES
72236 [facility] Neurology Progress Note    Nelson Sparks Patient Status:  Inpatient    10/30/1955 MRN [id number]   [facility] 3NW-A Attending Lionel Tran MD   Hosp Day # 4 PCP [facility]     CC: LIVIA    Subjective:  Rosas Love Situation:   Outreach for medication reconciliation.    Key Assessments:   Patient reports that his diarrhea has improved.   Patient denies any other health questions or concerns.    Actions Taken:   RNCM complete medication reconciliation with patient, there was no medication concerns found.   Encouraged patient to call RNCM or PCP care team with any non-emergent health questions or concerns.     Program plan:   Complete PHQ2.     Next follow up:   One-two weeks.       See hyperlinks within encounter for full documentation.     encephalopathy from sepsis/UTI vs combination  · Correct underlying metabolic issues/infectious process  · Avoid CNS depressants, as able  · ID following   · Psych following  · Celexa increased due to severe depression  · Recommends psychotherapy  · Hx of

## 2023-02-08 NOTE — CONSULTS
496[address] [facility] Neurology Initial Consultation    Nelson Bridger Patient Status:  Inpatient    10/30/1955 MRN [id number]   [facility] 2NE-A Attending Janette Pineda MD   Hosp Day # 1 PCP Cliff Reyna MD     [facility] [place] [place] You can access the FollowMyHealth Patient Portal offered by [facility] by registering at the following website: [email or web address]. By joining [employer]’s FollowMyHealth portal, you will also be able to view your health information using other applications (apps) compatible with our system. that she does not drink alcohol or use drugs. ALLERGIES:    Lisinopril              UNKNOWN, OTHER (SEE COMMENTS)    Comment:hyperkalemia    MEDICATIONS:  Insulin Lispro 100 UNIT/ML Subcutaneous Solution, Inject 2-8 Units into the skin TID & HS.  If bloo Take 1,000 mg by mouth daily. , Disp: , Rfl: , 10/24/2019 at Unknown time  HYDROcodone-acetaminophen 5-325 MG Oral Tab, Take 1 tablet by mouth every 6 (six) hours as needed for Pain., Disp: , Rfl: , Unknown at Unknown time  Fludrocortisone Acetate 0.1 MG Or Cream, Apply topically daily. , Disp: , Rfl: , Unknown at Unknown time  insulin detemir 100 UNIT/ML Subcutaneous Solution Pen-injector, Inject 5 Units into the skin nightly., Disp: 3 pen, Rfl: 0, 10/24/2019 at Unknown time  Glycopyrrolate (Kaleb Dillon) PRN  Umeclidinium Bromide (INCRUSE ELLIPTA) 62.5 MCG/INH inhaler 1 puff, 1 puff, Inhalation, Daily  traZODone HCl (DESYREL) tab 50 mg, 50 mg, Oral, Nightly  sodium bicarbonate tab 650 mg, 650 mg, Oral, BID  risperiDONE (RISPERDAL) tab 0.5 mg, 0.5 mg, Oral, tracks around the room but not to command, VFF to confrontation; face appears symmetric, sensation intact, tongue and palate midline, SCM intact; otherwise, 2-12 intact  Motor: Did follow command to lift arms, but with only minimal effort; when lifted up, You can access the FollowMyHealth Patient Portal offered by [facility] by registering at the following website: [email or web address]. By joining [facility]’s FollowMyHealth portal, you will also be able to view your health information using other applications (apps) compatible with our system. You can access the FollowMyHealth Patient Portal offered by [facility] by registering at the following website: [email or web address]. By joining [employer]’s FollowMyHealth portal, you will also be able to view your health information using other applications (apps) compatible with our system.

## 2024-02-06 NOTE — PLAN OF CARE
Problem: Impaired Swallowing  Goal: Minimize aspiration risk  Description  Interventions:  - Patient should be alert and upright for all feedings (90 degrees preferred)  - Offer food and liquids at a slow rate  - No straws  - Encourage small bites of martha Improved

## 2024-09-27 NOTE — BH PROGRESS NOTE
Patient given referrals to f/u with a psychiatrist and psychotherapist per Dr. De Yepez.
27-Sep-2024 20:53

## (undated) NOTE — IP AVS SNAPSHOT
[address]            (For Outpatient Use Only) Initial Admit Date: 10/24/2019   Inpt/Obs Admit Date: Inpt: 10/28/19 / Obs: 10/25/19   Discharge Date:    Autumn John:  [de-identified]   MRN: [de-identified]   CSN: [id number]   CEID: [id number] Subscriber ID:  Pt Rel to Subscriber:    Hospital Account Financial Class: Medicare    October 30, 2019

## (undated) NOTE — ED AVS SNAPSHOT
Maria Dolores Paulson   MRN: [id number]    Department:  [facility] Emergency Department   Date of Visit:  5/31/2019           Disclosure     Insurance plans vary and the physician(s) referred by the ER may not be covered by your plan.  Please contact tell this physician (or your personal doctor if your instructions are to return to your personal doctor) about any new or lasting problems. The primary care or specialist physician will see patients referred from the [facility] Emergency Department.  Lew Saxena

## (undated) NOTE — IP AVS SNAPSHOT
Patient Demographics     Address  [address] [place] [address] [place] [address] Phone  [phone] [facility])  [phone] (Mobile) *Preferred*      Emergency Contact(s)     Name Relation Home Work [address] [phone]    Reimngton Sinclair Stop taking on:  November 3, 2019   Patricia Gutierrez MD         Citalopram Hydrobromide 20 MG Tabs  Commonly known as:  CeleXA  Next dose due: Tomorrow 10/31      Take 20 mg by mouth daily.           diphenhydrAMINE HCl 25 MG Tabs  Commonly known as:  DI Next dose due: Tonight 10/30      Take 200 mg by mouth 2 (two) times daily. levetiracetam 1000 MG Tabs  Commonly known as:  KEPPRA  Next dose due: Tomorrow 10/31      Take 1,000 mg by mouth daily.           nystatin 079072 UNIT/GM Crea  Commonly [id number] amLODIPine Besylate (NORVASC) tab 5 mg 10/30/19 0810 Given      [id number] baclofen (LIORESAL) tab 20 mg 10/29/19 1717 Given      [id number] baclofen (LIORESAL) tab 20 mg 10/29/19 2047 Given      [id number] baclofen (LIORESAL) tab 20 mg 10/30/19 08 Order ID Medication Name Action Time Action Reason Comments    [id number] influenza vaccine split quad (FLULAVAL) ages 7 months to 59 years inj 0.5ml 10/30/19 1442 Given            RIGHT UPPER ARM     Order ID Medication Name Action Time Action Reason Comm Performed by [facility] [address], [address] [address] [phone]  www. Ashley Bynum MD, Lab.  Director            MANUAL DIFFERENTIAL [[id number]] (Abnormal)  Resulted: 10/30/19 0707, Result status: Final result   Ordering provider: Glucose 96 70 - 99 mg/dL [facility]   Sodium 138 136 - 145 mmol/L — [facility]   Potassium 5.1 3.5 - 5.1 mmol/L [facility]   Chloride 111 98 - 112 mmol/L — [facility]   CO2 19.0 21.0 - 32.0 mmol/L  [facility]   Anion Gap 8 0 - 18 mmol/L [facility] Ammonia 22 11 - 32 umol/L — [facility]            Testing Performed By     Lab - Abbreviation Name Director Address Valid Date Range    1957 [facility] [address] [facility],[address], [place] [address] [facility] [address] 11/14/14 0354 - Present    139 - E  10/30/1955 MRN [id number]   [facility] 5NW-A Attending Farideh Martinez MD   Hosp Day # 0 PCP Josseline Fox MD     Date:  10/25/2019  Date of Admission:  10/24/2019    History provided by:patient/ ER MD/NOTES/ NH NOTES  Chief Complaint: Lisinopril              UNKNOWN, OTHER (SEE COMMENTS)    Comment:hyperkalemia  insulin glargine 100 UNIT/ML Subcutaneous Solution, Inject into the skin nightly.   Fluticasone-Salmeterol (AIRDUO RESPICLICK 825/93) 714-94 MCG/ACT Inhalation Aerosol Powder, Br Ketoconazole 2 % External Shampoo, Apply topically every third day. To scalp for seborrheic dermatitis  nystatin 547797 UNIT/GM External Cream, Apply 1 Application topically 2 (two) times daily.  To groin, under breasts, jerald-area, and abd. folds  HYDROcodo BUN 78 (H) 10/25/2019     (H) 10/25/2019    K 5.3 (H) 10/25/2019     (H) 10/25/2019    CO2 21.0 10/25/2019    GLU 67 (L) 10/25/2019    CA 8.7 10/25/2019    ALB 2.3 (L) 10/25/2019    ALKPHO 79 10/25/2019    BILT 0.2 10/25/2019    TP 6.4 10/25/2 by Technologist)  Patient presents with increasing altered mental status and lethargy per patient's family. Patient has a history of chronic kidney disease. FINDINGS:  Some of the images are degraded by patient motion.  VENTRICLES/SULCI:  Ventricles and plate and screw devices. CONCLUSION:  No acute intrathoracic process is identified.     Dictated by: Rupal Nuñez MD on 10/24/2019 at 21:56     Approved by: Rupal Nuñez MD on 10/24/2019 at 21:57           No results found for this visit on Acute kidney injury ([facility] [address]     Chronic renal failure, stage 4 (severe) (HCC)     Weakness generalized     Anemia, unspecified type     Palliative care by specialist     Goals of care, counseling/discussion     Candidiasis        Urinary tract infection w 'word salad' when asked some question. This morning around 0520 per chart: Patient awake taking longer to answer question, unable to answer some questions. Is able to answer yes/ no question. Which is different from previous interactions with the patient. insulin detemir (LEVEMIR) 100 UNIT/ML flextouch 5 Units, 5 Units, Subcutaneous, Nightly  diphenhydrAMINE HCl (BENADRYL) injection 25 mg, 25 mg, Intravenous, Q6H PRN  hydrALAzine HCl (APRESOLINE) injection 10 mg, 10 mg, Intravenous, Q6H PRN  Insulin Aspart Frequency: Never    Family History   Problem Relation Age of Onset   • Cancer Neg       Physical Examination:  /43 (BP Location: Right arm)   Pulse 72   Temp 98 °F (36.7 °C) (Oral)   Resp 18   Wt 194 lb 11.2 oz (88.3 kg)   SpO2 99%   BMI 33.42 kg Essential hypertension    Altered mental status, unspecified altered mental status type    CKD (chronic kidney disease) stage 5, GFR less than 15 ml/min (HCC)    Candidiasis    CT head unremarkable  Plan:  Cont current keppra 1 gm daily  Check keppra lev History of seizure     Cognitive impairment     Acute delirium     Severe episode of recurrent major depressive disorder, without psychotic features ([address]     UTI (urinary tract infection), bacterial     Visual hallucination     Essential hypertension 96%   BMI 30.74 kg/m²[HA. 2]     General Appearance:    Alert, cooperative, no distress, appears stated age   Head:    Normocephalic, without obvious abnormality, atraumatic   Eyes:    PERRL, conjunctiva/corneas clear, EOM's intact, fundi     benign, both e Take 25 mg by mouth every 6 (six) hours as needed for Itching. Citalopram Hydrobromide 20 MG Oral Tab  Take 20 mg by mouth daily. insulin detemir 100 UNIT/ML Subcutaneous Solution Pen-injector  Inject 5 Units into the skin nightly.   Qty: 3 pen Refill breasts, jerald-area, and abd. folds    HYDROcodone-acetaminophen 5-325 MG Oral Tab  Take 1 tablet by mouth every 6 (six) hours as needed for Pain. acetaminophen 325 MG Oral Tab  Take 650 mg by mouth every 6 (six) hours as needed for Pain.     bisacodyl (D Medication Administration Recommendations: One pill at a time; Whole in puree  Treatment Plan/Recommendations: Dysphagia therapy    HISTORY   Background/Objective Information:    Problem List  Principal Problem:    Urinary tract infection without hematuria, Patient Positioned: Upright;Midline;DESTINY chair(tends to lean to right, but easily repositioned). Patient Viewed: Lateral.  Patient Alertness: Fully alert. Consistencies Presented: Thin liquids; Hard solid to assess oropharyngeal swallow function and assess sequential swallows by cup and straw. She was encouraged to take smaller sips at a slower rate to encourage aspiration precaution observation.               GOALS  Goal #1 The patient will tolerate regular consistency and thin liquids without overt signs o She did not demonstrate any intolerance of po this morning per nursing. She was able to self present thin liquids and demonstrated no overt signs of aspiration throughout. Current diet is appropriate. Continue aspiration precautions.     Diet Recommendat

## (undated) NOTE — IP AVS SNAPSHOT
Patient Demographics     Address  [address] [place] [address] Phone  [phone] [facility])  [phone] (Mobile) *Preferred*      Emergency Contact(s)     Name Relation Home Work [address] [place] [address]     Mariya Brumfield Take 25 mg by mouth every 6 (six) hours as needed for Itching. docusate sodium 100 MG Caps  Commonly known as:  COLACE      Take 100 mg by mouth 2 (two) times daily.           DULCOLAX 10 MG Supp  Generic drug:  bisacodyl      Place 10 mg recta sodium bicarbonate 650 MG Tabs      Take 1 tablet (650 mg total) by mouth 2 (two) times daily. Irene Canales MD         TAB-A-RAHEEM Tabs      Take 1 tablet by mouth daily.           triamcinolone acetonide 0.1 % Crea  Commonly known as:  KENALOG      Appl [id number] levETIRAcetam (KEPPRA) tab 1,000 mg 08/09/19 0914 Given      [id number] multivitamin (ADULT) tab 1 tablet 08/09/19 0914 Given      [id number] nystatin (MYCOSTATIN) 807665 UNIT/GM cream 1 Application 50/65/98 9614 Given      [id number] risperiDONE ( BUN 91 7 - 18 mg/dL [facility]   Creatinine 4.50 0.55 - 1.02 mg/dL [facility]   BUN/CREA Ratio 20.2 10.0 - 20.0 [facility]   Calcium, Total 8.1 8.5 - 10.1 mg/dL  [facility]   Calculated Osmolality 319 275 - 295 mOsm/kg [facility]   GFR, Non-African Microbiology Results (All)     None         H&P - H&P Note      H&P filed by Marcus Atkins MD at 8/7/2019  1:29 PM / Draft: Not Electronically Signed  Version 1 of 1    Author:  Marcus Atkins MD Service:  Internal Medicine Author Type:  Physician FAMILY HISTORY:  Noncontributory. SOCIAL HISTORY:  No smoking, alcohol, drug abuse. CODE STATUS:  Full. REVIEW OF SYSTEMS:  Positive for above, otherwise negative. PHYSICAL EXAMINATION:    GENERAL:  General condition fair, awake, alert x2. Consults signed by Christopher Padilla MD at 8/7/2019  2:34 PM     Author:  Christopher Padilla MD Service:  Nephrology Author Type:  Physician    Filed:  8/7/2019  2:34 PM Date of Service:  8/7/2019  2:29 PM Status:  Addendum    :  Christopher Padilla MD (Physi • Unsteady gait     lack of coordination w/ abnormal posture    • UTI (urinary tract infection)      Past Surgical History:   Procedure Laterality Date   • BACK SURGERY      c5-c7 herniated disc   • HC INCISION AND DRAINAGE PERIRECTAL ABSCESS       Family Oral, Q6H PRN  •  Labetalol HCl (NORMODYNE) tab 200 mg, 200 mg, Oral, BID  •  levETIRAcetam (KEPPRA) tab 1,000 mg, 1,000 mg, Oral, Daily  •  multivitamin (ADULT) tab 1 tablet, 1 tablet, Oral, Daily  •  nystatin (MYCOSTATIN) 547620 UNIT/GM cream 1 Applicati CA 8.2 08/06/2019    ALB 1.9 08/06/2019    ALKPHO 68 08/06/2019    BILT 0.2 08/06/2019    TP 5.7 08/06/2019    AST <3 08/06/2019    ALT 7 08/06/2019    PGLU 116 08/07/2019       Imaging: All imaging studies reviewed.       Thank you for allowing me to par Benign essential HTN     Acute renal failure superimposed on chronic kidney disease (HCC)     Acute metabolic encephalopathy     Acute on chronic renal failure (HCC)     Acute renal failure superimposed on chronic kidney disease, unspecified CKD stage, General Appearance:    Alert, cooperative, no distress, appears stated age   Head:    Normocephalic,  atraumatic   Neck:   Supple, symmetrical, trachea midline,        thyroid:      no JVD   Lungs:     Clear to auscultation bilaterally, respirations unlabo • nystatin  1 Application Topical BID   • Pimecrolimus   Topical Daily   • risperiDONE  0.5 mg Oral BID   • sodium bicarbonate  650 mg Oral BID      ipratropium-albuterol, ondansetron HCl, acetaminophen, bisacodyl, diphenhydrAMINE HCl, HYDROcodone-acetamin insulin detemir 100 UNIT/ML Subcutaneous Solution Pen-injector  Inject 5 Units into the skin nightly.   Qty: 3 pen Refills: 0      CONTINUE these medications which have NOT CHANGED    Fluticasone-Salmeterol (AIRDUO RESPICLICK 228/52) 278-82 MCG/ACT Inhalati Glycopyrrolate (SEEBRI NEOHALER) 15.6 MCG Inhalation Cap  Inhale 1 capsule into the lungs 2 (two) times daily. Ketoconazole 2 % External Shampoo  Apply topically every third day.  To scalp for seborrheic dermatitis    HYDROcodone-acetaminophen 5-325 MG O Pt seen this AM for bedside swallow evaluation. Pt admitted to hospital due to abnormal labs. Pt diagnosed with failure to thrive given decreased PO intake and noncompliance with dialysis.  MD noted consideration of feeding tube given inability to maintain Past Medical History[LO.1]  Past Medical History:   Diagnosis Date   • Altered mental status     A&OX2 PER NORMAL    • Candidiasis    • Cervical disc disease with myelopathy    • Chronic pain    • CKD (chronic kidney disease)    • Constipation    • Convuls Videofluoroscopic Swallow Study is required to rule-out silent aspiration. )[LO.1]    Esophageal Phase of Swallow: No complaints consistent with possible esophageal involvement[LO.2]      FOLLOW UP[LO.1]  Treatment Plan/Recommendations: No further inpatient

## (undated) NOTE — LETTER
Alexandra Pereira [address] [address]  [place], [address] [facility]    Consent for Operation  Date: __________________                                Time: _______________    1.  I authorize the performance upon Galileo Shepherd the following operation: Jhonatan Howard videotape. [facility] will not be responsible for storage or maintenance of this tape. 6. For the purpose of advancing medical education, I consent to the admittance of observers to the Operating Room.   7. I authorize the use of any specimen, organs, ti When the patient is a minor or mentally incompetent to give consent:  Signature of person authorized to consent for patient: ___________________________   Relationship to patient: ____________________________________________________    Signature of Witness these medicines may increase my risk of anesthetic complications. iv. If I am allergic to anything or have had a reaction to anesthesia before. 3. I understand how the anesthesia medicine will help me (benefits).   4. I understand that with any type of an patient’s representative) and answered their questions. The patient or their representative has agreed to have anesthesia services.     _____________________________________________________________________________  Witness        Date   Time  I have gregory

## (undated) NOTE — IP AVS SNAPSHOT
Patient Demographics     Address  Alexandra Hector [facility] [address] Phone  [phone] [facility])  [phone] (Mobile) *Preferred*      Emergency Contact(s)     Name Relation Home Work [address] [place] [address]     [address] [place] [address] Take 200 mg by mouth 2 (two) times daily. levetiracetam 1000 MG Tabs  Commonly known as:  KEPPRA      Take 1 tablet (1,000 mg total) by mouth daily.    Lilliam Briones MD         nystatin [id number] UNIT/GM Crea  Commonly known as:  MYCOSTATIN [id number] levETIRAcetam (KEPPRA) tab 1,000 mg 03/21/19 0759 Given      [id number] sodium bicarbonate tab 650 mg 03/21/19 1021 Given            LEFT UPPER ARM     Order ID Medication Name Action Time Action Reason Comments    [id number] Insulin Aspart Pen (N Blood — 03/21/19 0708          Components    Component Value Reference Range Flag Lab   Glucose 123 70 - 99 mg/dL [facility]   Sodium 141 136 - 145 mmol/L — [facility]   Potassium 4.9 3.5 - 5.1 mmol/L [facility]   Chloride 111 98 - 107 mmol/L Abelardo Jimenez :  Shruthi Santos MD (Physician)         [facility]  History and Physical[AS.1]     Anni Bourne[AS.2] Patient Status:  Emergency    10/30/1955 MRN [id number]   Location [address] [place] [address] Attending Shara Grace Zinc Oxide 10 % External Ointment Apply 1 Application topically 3 (three) times daily. Disp:  Rfl:    AmLODIPine Besylate 5 MG Oral Tab Take 10 mg by mouth daily.  Disp:  Rfl:    baclofen 20 MG Oral Tab Take 1 tablet (20 mg total) by mouth 3 (three) times Abdomen: Soft, nontender, nondistended. Positive bowel sounds. No rebound, guarding or organomegaly. Neurologic: No focal neurological deficits. CNII-XII grossly intact. Musculoskeletal: Moves all extremities. Extremities: No edema or cyanosis.   Integu Filed:  3/19/2019 10:27 PM Date of Service:  3/19/2019  6:02 PM Status:  Addendum    :  Randy Raymond MD (Physician)    Related Notes:  Original Note by Randy Raymond MD (Physician) filed at 3/19/2019  6:24 PM     Consult Orders    1.  IP Consult t • HC INCISION AND DRAINAGE PERIRECTAL ABSCESS       Family History   Family history unknown: Yes      reports that she has been smoking. she has never used smokeless tobacco. She reports that she does not drink alcohol or use drugs.     Allergies:    Lisin Review of Systems:  See HPI; A total of 12 systems reviewed and otherwise unremarkable. Physical Exam:  Vital signs: Blood pressure 141/60, pulse 60, temperature 97.6 °F (36.4 °C), temperature source Oral, resp.  rate 18, height 64\", weight 165 lb 2 oz, appears to be back to her usual baseline mentation now  4. Seizure d/o   5. DM/HTN      Care discussed w/ patient/her  and primary RN    Thank you for allowing me to participate in this patient's care.   Please feel free to call me with any questions dependent for all ADL's, dependent for mobility with use of total lift for transfers. Will dc from our services as pt is not appropriate for skilled IPOT at this time. [KP.1]     Attribution Key    KP. 1 - Addie Baig OT on 3/19/2019  9:19 AM without overt signs or symptoms of aspiration with 100 % accuracy over 1 session(s).  Met   Goal #2 The patient/family/caregiver will demonstrate understanding and implementation of aspiration precautions and swallow strategies independently over 1 session(

## (undated) NOTE — IP AVS SNAPSHOT
[address]            (For Outpatient Use Only) Initial Admit Date: 4/5/2019   Inpt/Obs Admit Date: Inpt: 4/5/19 / Obs: N/A   Discharge Date:    Brittny Krunal:  [de-identified]   MRN: [de-identified]   CSN: [id number]        ENCOUNTER  Patient C Hospital Account Financial Class: Medicare    April 9, 2019

## (undated) NOTE — IP AVS SNAPSHOT
[address]            (For Outpatient Use Only) Initial Admit Date: 12/22/2018   Inpt/Obs Admit Date: Inpt: 12/22/18 / Obs: N/A   Discharge Date:    Arsalan Argueta:  [de-identified]   MRN: [de-identified]   CSN: [id number]        [facility] Subscriber ID:  Pt Rel to Subscriber:    Hospital Account Financial Class: Medicare    December 24, 2018

## (undated) NOTE — ED AVS SNAPSHOT
Joy Cutler   MRN: [id number]    Department:  [facility] Emergency Department   Date of Visit:  9/9/2019           Disclosure     Insurance plans vary and the physician(s) referred by the ER may not be covered by your plan.  Please contact tell this physician (or your personal doctor if your instructions are to return to your personal doctor) about any new or lasting problems. The primary care or specialist physician will see patients referred from the [facility] Emergency Department.  Duong Piña

## (undated) NOTE — IP AVS SNAPSHOT
[address]            (For Outpatient Use Only) Initial Admit Date: 12/11/2019   Inpt/Obs Admit Date: Inpt: 12/11/19 / Obs: N/A   Discharge Date:    Carley Elida:  [de-identified]   MRN: [de-identified]   CSN: [id number]   CEID: [id number] Subscriber ID:  Pt Rel to Subscriber:    Hospital Account Financial Class: Medicare    December 20, 2019

## (undated) NOTE — IP AVS SNAPSHOT
Patient Demographics     Address  [address]andra Hector [facility] [address] Phone  [phone] [facility])  [phone] (Mobile) *Preferred*      Emergency Contact(s)     Name Relation Home Work [address] [place] [address]     [address] [place] [address] Commonly known as:  COLACE      Take 100 mg by mouth 2 (two) times daily. DULCOLAX 10 MG Supp  Generic drug:  bisacodyl      Place 10 mg rectally daily as needed.           Fludrocortisone Acetate 0.1 MG Tabs  Commonly known as:  FLORINEF  Start ta torsemide 20 MG Tabs           [id number] - MAR ACTION REPORT  (last 24 hrs)    ** SITE UNKNOWN **     Order ID Medication Name Action Time Action Reason Comments    [id number] Fludrocortisone Acetate (FLORINEF) tab 0.1 mg 04/08/19 1601 Given      [id number] 04/08/19 2224 Given      [id number] Heparin Sodium (Porcine) 5000 UNIT/ML injection 5,000 Units 04/09/19 0938 Given              Recent Vital Signs       Most Recent Value   Vitals  140/58 Filed at 04/09/2019 1238   Pulse  56 Filed at 04/09/2019 1238   Resp Ordering provider:  Janes Magallon MD  04/08/19 6636 Resulting lab:  [facility]    Specimen Information    Type Source Collected On   Blood — 04/08/19 1534          Components    Component Value Reference Range Flag Lab   Potassium 6.0 3.5 - 5.1 mmol/L Location [address] [place] [address] Attending Shonda Coyle MD   Hosp Day # 0 PCP [facility]     Chief Complaint: lethargy    History of Present Illness: Madison Kohli is a 66year old female with a past medical history of COPD, DM,  C 2 (two) times daily. Disp:  Rfl:    sodium bicarbonate 650 MG Oral Tab Take 1 tablet (650 mg total) by mouth 2 (two) times daily. Disp: 60 tablet Rfl: 11   risperiDONE 0.5 MG Oral Tab Take 0.5 mg by mouth 2 (two) times daily.  Disp:  Rfl:    levetiracetam 1 Cardiovascular: S1, S2. Regular rate and rhythm. No murmurs, no rubs or gallops. Equal pulses. Chest and Back: No tenderness or deformity. Abdomen: Soft, nontender, nondistended. Positive bowel sounds. No rebound, guarding or organomegaly.   Neurologic: H&P signed by Gibson Winkler MD at 4/5/2019  6:13 PM  Version 1 of 2    Author:  Gibson Winkler MD Service:  — Author Type:  Physician    Filed:  4/5/2019  6:13 PM Date of Service:  4/5/2019  5:54 PM Status:  Signed    :  Gibson Winkler MD ([address] [place] [address] Street No current facility-administered medications on file prior to encounter. Current Outpatient Medications on File Prior to Encounter:  Sulfamethoxazole-TMP -160 MG Oral Tab per tablet Take 1 tablet by mouth 2 (two) times daily.  Disp:  Rfl:    Heparin Pertinent positives and negatives noted in the HPI. Physical Exam:    /76   Pulse 78   Temp 98.7 °F (37.1 °C) (Oral)   Resp 16   Wt 165 lb 2 oz (74.9 kg)   SpO2 96%   BMI 28.34 kg/m²   General: No acute distress. Alert and oriented x 2.   HEENT: No Dispo: MS improving. IVF. Anti-fungal meds. Given hx of SZ disorder will repeat EEG    Quality:  · DVT Prophylaxis: heparin  · CODE status: full  · Chatman: no    Plan of care discussed with ED physician    Linell Mcburney, MD  4/5/2019[FA. 1] Procedure Laterality Date   • BACK SURGERY      c5-c7 herniated disc   • HC INCISION AND DRAINAGE PERIRECTAL ABSCESS       Family History   Family history unknown: Yes      reports that she has been smoking.   she has never used smokeless tobacco. She repor Polyethylene Glycol 3350 Oral Powd Pack Take 17 g by mouth daily.        Review of Systems:  Pt cannot provide details      Physical Exam:   /76   Pulse 78   Temp 98.7 °F (37.1 °C) (Oral)   Resp 16   Wt 165 lb 2 oz   SpO2 96%   BMI 28.34 kg/m²   Temp HGB  8.1*   MCV  93.5   PLT  323.0       Recent Labs   Lab  04/05/19   1303   NA  147*   K  6.0*   CL  119*   CO2  21.0   BUN  68*   CREATSERUM  3.75*   CA  9.0   GLU  98       Recent Labs   Lab  04/05/19   1303   ALT  15   AST  5*   ALB  1.9*       No res is not appropriate for skilled PT services, pt at baseline level, RN made aware & is in agreement.[AZ.1]  DC PT services at this time[AZ.2]; orders completed & acknowledged[AZ.3]. [AZ.2]     Attribution Garcia    AZ. 1 - Akila Byrnes, PT on 4/7/2019  9:12

## (undated) NOTE — IP AVS SNAPSHOT
[address]            (For Outpatient Use Only) Initial Admit Date: 8/6/2019   Inpt/Obs Admit Date: Inpt: N/A / Obs: 08/06/19   Discharge Date:    Fouzia Olmos:  [de-identified]   MRN: [de-identified]   CSN: [id number]   CEID: [id number] Subscriber ID:  Pt Rel to Subscriber:    Hospital Account Financial Class: Medicare    August 9, 2019

## (undated) NOTE — IP AVS SNAPSHOT
[address]            (For Outpatient Use Only) Initial Admit Date: 6/2/2019   Inpt/Obs Admit Date: Inpt: N/A / Obs: 06/02/19   Discharge Date:    Hospital Acct:  [de-identified]   MRN: [de-identified]   CSN: [id number]   CEID: [id number] Subscriber ID:  Pt Rel to Subscriber:    Hospital Account Financial Class: Medicare    June 4, 2019

## (undated) NOTE — IP AVS SNAPSHOT
[address]            (For Outpatient Use Only) Initial Admit Date: 12/3/2019   Inpt/Obs Admit Date: Inpt: 12/3/19 / Obs: N/A   Discharge Date:    Tianna Cristobal:  [de-identified]   MRN: [de-identified]   CSN: [id number]   CEID: [id number] Subscriber ID:  Pt Rel to Subscriber:    Hospital Account Financial Class: Medicare    December 10, 2019

## (undated) NOTE — ED AVS SNAPSHOT
Galileo Shepherd   MRN: [id number]    Department:  [facility] Emergency Department   Date of Visit:  7/31/2019           Disclosure     Insurance plans vary and the physician(s) referred by the ER may not be covered by your plan.  Please contact tell this physician (or your personal doctor if your instructions are to return to your personal doctor) about any new or lasting problems. The primary care or specialist physician will see patients referred from the [facility] Emergency Department.  Arthor Bernheim

## (undated) NOTE — IP AVS SNAPSHOT
[address]            (For Outpatient Use Only) Initial Admit Date: 12/7/2018   Inpt/Obs Admit Date: Inpt: 12/7/18 / Obs: N/A   Discharge Date:    Hospital Acct:  [de-identified]   MRN: [de-identified]   CSN: [id number]        ENCOUNTER  Patient Subscriber ID:  Pt Rel to Subscriber:    Hospital Account Financial Class: Medicare    December 13, 2018

## (undated) NOTE — IP AVS SNAPSHOT
Patient Demographics     Address  [address] [place] [address] [address] [address] Phone  [phone] [facility])  [phone] (Mobile) *Preferred*      Emergency Contact(s)     Name Relation Home Work [facility] [place] Spouse [phone] glipiZIDE 5 MG Tabs  Commonly known as:  GLUCOTROL  Next dose due:  12/24- 9 PM      Take 5 mg by mouth 2 (two) times daily before meals.           Labetalol HCl 200 MG Tabs  Commonly known as:  Benay Kales  Next dose due:  12/24- 9 PM      Take 200 mg by lazara Given      [id number] aspirin EC EC tab 325 mg (Or Linked Group #1) 12/24/18 0857 Given      [id number] baclofen (LIORESAL) tab 20 mg 12/23/18 2133 Given      [id number] baclofen (LIORESAL) tab 20 mg 12/24/18 0857 Given      [id number] baclofen (LIORESAL) tab Specimen Information    Type Source Collected On   Blood — 12/24/18 1440          Components    Component Value Reference Range Flag Lab   Glucose 205 70 - 99 mg/dL [facility]   Sodium 146 136 - 144 mmol/L [facility]   Potassium 3.8 3.6 - 5.1 mmol/L — Blood Culture FREQ X 2 [[id number]] Collected:  12/22/18 0351    Order Status:  Completed Lab Status:  Preliminary result Updated:  12/24/18 0500    Specimen:  Blood,peripheral      Blood Culture Result No Growth 2 Days    Blood Culture FREQ X 2 [[id number]  10/30/1955 MRN [id number]   Location [address] [facility] Attending Kary Solano MD   Hosp Day # 0 PCP [facility]     Chief Complaint: AMS    History of Present Illness: Dhara Duarte is a 66year old female with hx of hyperte AmLODIPine Besylate 5 MG Oral Tab Take 10 mg by mouth daily. Disp:  Rfl:    baclofen 20 MG Oral Tab Take 1 tablet (20 mg total) by mouth 3 (three) times daily.  Disp: 90 tablet Rfl: 0   HYDROcodone-acetaminophen 5-325 MG Oral Tab Take 1 tablet by mouth ever Chest and Back: No tenderness or deformity. Abdomen: Soft, nontender, nondistended. Positive bowel sounds. No rebound, guarding or organomegaly. Neurologic: No focal neurological deficits. CNII-XII grossly intact.   Musculoskeletal: Moves all extremities Filed:  12/23/2018  8:48 AM Date of Service:  12/23/2018  8:32 AM Status:  Signed    :  Jesús Acharya MD (Physician)     Consult Orders    1.  IP Consult to Neurology Once [[id number]] ordered by Yesica Saldivar MD at 12/22/18 Dari Arreola Metoclopramide HCl (REGLAN) injection 5 mg 5 mg Intravenous Q8H PRN   levETIRAcetam (KEPPRA) 500 mg in sodium chloride 0.9 % 100 mL IVPB 500 mg Intravenous Nightly   levETIRAcetam (KEPPRA) 250 mg in sodium chloride 0.9 % 100 mL IVPB 250 mg Intravenous Maicol Physical Examination:[YZ.1]  BP (!) 173/68 (BP Location: Left arm)   Pulse 70   Temp 97.8 °F (36.6 °C) (Axillary)   Resp 18   Wt 165 lb 3.2 oz   SpO2 95%   BMI 29.26 kg/m²[YZ.2]   Lungs: clear to auscultation   CV: S1, S2 +  Abdomen: soft, non tender, no m Altered mental status, unspecified altered mental status type  Active Problems:    Toxic metabolic encephalopathy    Diabetes (HCC)    CKD (chronic kidney disease) stage 3, GFR 30-59 ml/min ([facility])    History of seizure    UTI (urinary tract infection), ba :  Abril Treadwell, SLP (SPEECH-LANGUAGE PATHOLOGIST)       SPEECH DAILY NOTE - INPATIENT    ASSESSMENT & PLAN   ASSESSMENT  Pt seen for meal assess/dysphagia therapy to monitor po tolerance of recommended diet and ensure adherence to aspiration p Goal #4      VFSS if csa persist with thin or worsening resp/pulm                   status    FOLLOW UP  Follow Up Needed: Yes  SLP Follow-up Date: 12/26/18  Number of Visits to Meet Established Goals: (2-3)    Session: 1    If you have any questions, paulino

## (undated) NOTE — ED AVS SNAPSHOT
Dhara Duarte   MRN: [id number]    Department:  [facility] Emergency Department   Date of Visit:  7/20/2019           Disclosure     Insurance plans vary and the physician(s) referred by the ER may not be covered by your plan.  Please contact tell this physician (or your personal doctor if your instructions are to return to your personal doctor) about any new or lasting problems. The primary care or specialist physician will see patients referred from the [facility] Emergency Department.  Nelson Chiu

## (undated) NOTE — IP AVS SNAPSHOT
Patient Demographics     Address  [address] [place] [address] Phone  [phone] [facility])  [phone] (Mobile) *Preferred*      Emergency Contact(s)     Name Relation Home Work [address] [place] [address]     Kit Child betamethasone valerate 0.1 % Crea  Commonly known as:  VALISONE      Apply 1 Application topically daily. To bilateral ankles          calcium acetate 667 MG Caps  Commonly known as:  PHOSLO      Take 1 capsule by mouth 2 (two) times daily.           Citalo Take 1 tablet (650 mg total) by mouth 2 (two) times daily. Kerline Olivera MD         Sodium Polystyrene Sulfonate 15 GM/60ML Susp  Commonly known as:  KAYEXALATE      Take 15 g by mouth daily. TAB-A-RAHEEM Tabs      Take 1 tablet by mouth daily. [id number] risperiDONE (RISPERDAL) tab 0.5 mg 06/18/19 2107 Given      [id number] risperiDONE (RISPERDAL) tab 0.5 mg 06/19/19 0831 Given      [id number] selenium sulfide (SELSUN) 2.5 % shampoo 06/19/19 0832 Given      [id number] sodium bicarbonate tab 650 mg Result status: Final result   Ordering provider:  Edis Velazco MD  06/18/19 2300 [facility]ing lab[facility]   Narrative: The following orders were created for panel order CBC WITH DIFFERENTIAL WITH PLATELET.   Procedure                               Abn Coronavirus Hku1 PCR: Negative     Coronavirus Nl63 PCR: Negative     Coronavirus Oc43 PCR: Negative     Metapneumovirus PCR: Negative     Rhinovirus/Entero PCR: Positive     Influenza A PCR: Negative     Influenza B PCR: Negative     Parainfluenza 1 PCR • Cervical disc disease with myelopathy    • Chronic pain    • CKD (chronic kidney disease)    • Constipation    • Convulsions (HCC)    • COPD (chronic obstructive pulmonary disease) (HCC)    • Depression    • Diabetes (HCC)    • Encephalopathy    • Essent daily.   Disp:  Rfl:    Sodium Polystyrene Sulfonate 15 GM/60ML Oral Suspension Take 15 g by mouth daily. Disp:  Rfl:    HYDROcodone-acetaminophen 5-325 MG Oral Tab Take 1 tablet by mouth every 6 (six) hours as needed for Pain.  Disp:  Rfl:    Fludrocorti Neck: No lymphadenopathy. No JVD. No carotid bruits. Respiratory: Coarse BS b/l  Cardiovascular: S1, S2. Regular rate and rhythm. No murmurs, rubs or gallops. Equal pulses. Chest and Back: No tenderness or deformity.   Abdomen: Soft, nontender, nondisten Author:  Lv Hylton MD Service:  Pulmonology Author Type:  Physician    Filed:  6/17/2019 11:59 AM Date of Service:  6/17/2019 11:26 AM Status:  Signed    :  Lv Hylton MD (Physician)     Consult Orders    1.  IP consult to [facility] c5-c7 herniated disc   • HC INCISION AND DRAINAGE PERIRECTAL ABSCESS       Family History   Problem Relation Age of Onset   • Cancer Neg       reports that she has been smoking.   She has never used smokeless tobacco. She reports that she does not drink al •  ondansetron HCl (ZOFRAN) injection 4 mg, 4 mg, Intravenous, Q6H PRN  •  Metoclopramide HCl (REGLAN) injection 5 mg, 5 mg, Intravenous, Q8H PRN  •  glucose (DEX4) oral liquid 15 g, 15 g, Oral, Q15 Min PRN **OR** Glucose-Vitamin C (DEX-4) 4-6 GM-MG chewab 06/16/19 1640 147/52 98.6 °F (37 °C) Oral 74 18 94 %   06/16/19 1348 133/37 — — 81 — —   06/16/19 1209 (!) 171/55 — — 89 18 93 %   06/16/19 1200 — 99.9 °F (37.7 °C) Oral — — —       Wt Readings from Last 6 Encounters:  06/15/19 : 167 lb 11.2 oz (76.1 kg) Thank you for including us in the care of this patient. We will follow with you. Meredith Isabel  6/17/2019  11:26 AM[OS.1]      Electronically signed by Elisabeth Omalley MD on 6/17/2019 11:59 AM   Attribution Key    OS. 1 - Elisabeth Omalley KP.1 - Kirby Ward OT on 6/18/2019  7:52 AM                        Video Swallow Study Note - Video Swallow Study Notes      Video Swallow Study Note signed by DEXTER Kaur at 6/18/2019  4:34 PM  Version 1 of 1    Author:  Chiquis Lofton • Seborrheic dermatitis of scalp    • Seizure disorder (HCC)     unspecified convusions    • Unspecified behavioral syndromes associated with physiological disturbances and physical factors    • Unsteady gait     lack of coordination w/ abnormal posture Strategy(ies) Implemented (Thin Liquids): Slow rate;Small bites and sips  Effectiveness: No  NECTAR THICK LIQUIDS  Method of Presentation: Teaspoon;Cup  Oral Phase of Swallow (VFSS - Nectar Thick Liquids):  Impaired  Bolus Retrieval (VFSS - Carroll Thick Liq Pharyngeal phase initiated at the level of the valleculae following a 2-3 second delay resulting in pooling in the valleculae before the swallow.      Patient with partial approximation of the arytenoid to the epiglottic swallow, partial anterior hyoid excu Pager# [phone][CJ.1]    Russ Elizabeth, DEXTER   Attribution Key    CJ.1 - Russ Elizabeth, SLP on 6/18/2019  9:58 AM  CJ.2 - Russ Elizabeth, DEXTER on 6/18/2019  4:26 PM                        SLP Note - SLP Notes      SLP Note signed by Russ Elizabeth, Compensatory Strategies Recommended: Small bites and sips;Multiple swallows; Slow rate  Aspiration Precautions: Upright position; Slow rate;Small bites and sips; No straw  Medication Administration Recommendations: Whole in puree  Treatment Plan/Recommendatio for BSSE with regular with thin liquids recommended. [CJ.2]   Imaging Results:[CJ.1]   CXR:  1. Findings are suggestive of bronchitis/reactive airways disease.   There is more prominent right perihilar airspace opacity which may represent developing pneumoni CJ.1 - DEXTER Garcia on 6/17/2019  3:50 PM  CJ.2 - DEXTER Garcia on 6/17/2019  4:14 PM  CJ.3 - DEXTER Garcia on 6/17/2019  3:51 PM                     Immunizations     Name Date      INFLUENZA defer-12/24/18     Deferral:

## (undated) NOTE — ED AVS SNAPSHOT
Joy Cutler   MRN: [id number]    Department:  [facility] Emergency Department   Date of Visit:  7/11/2019           Disclosure     Insurance plans vary and the physician(s) referred by the ER may not be covered by your plan.  Please contact tell this physician (or your personal doctor if your instructions are to return to your personal doctor) about any new or lasting problems. The primary care or specialist physician will see patients referred from the [facility] Emergency Department.  Severo Pastures

## (undated) NOTE — IP AVS SNAPSHOT
Patient Demographics     Address  [address] [place] [address] [address] [address] Phone  [phone] [facility])  [phone] (Work)  [phone] [facility]      Emergency Contact(s)     Name [address] [facility] Work Mobile    savannah rodarte [address]  [phone] Needs 2 sets of blood cultures sent 7 to 10 days after she completes abx  Stop taking on:  12/10/2018   Holley Ceja MD         Citalopram Hydrobromide 20 MG Tabs  Commonly known as:  CeleXA      Take 20 mg by mouth daily.           docusate sodium 100 MG [id number] acetaminophen (TYLENOL) tab 650 mg 12/04/18 0411 Given      [id number] acetaminophen (TYLENOL) tab 650 mg 12/04/18 0914 Given      [id number] escitalopram (LEXAPRO) tablet 10 mg 12/04/18 0914 Given      [id number] hydrALAzine HCl (APRESOLINE) injec Order Status:  Completed Lab Status:  Final result Updated:  12/01/18 1400    Specimen:  Blood,peripheral      Blood Culture Result No Growth 5 Days    Blood Culture FREQ X 2 [[id number]]  (Abnormal) Collected:  11/23/18 2043    Order Status:  Completed La It is possible there is sepsis related to her extensive hidradenitis process. [DW.1]     Electronically signed by Jay Cutler MD on 11/24/2018  7:41 PM   Attribution Garcia    DW. 1 - Jay Cutler MD on 11/24/2018  7:40 PM                        Cons  César Hoguejuantaty to be her Power of [place]. [RH.1] Appreciate spiritual care help.  agreed to sign the POA paperwork. She does NOT have a guardian.  There was talk in the past about[RH.2]  Florence Ridge from [facility] [phone] nehemias[RH.1]michelle[RH.2] he Social and Developmental History:[RH.1] . No children. 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 She stopped working in 1997 when she had the cervical myelopathy and couldn't walk anymore.  Her  is he •  levETIRAcetam (KEPPRA) 500 mg in sodium chloride 0.9 % 100 mL IVPB, 500 mg, Intravenous, Q12H  •  Sodium Polystyrene Sulfonate (KAYEXALATE) 15 GM/60ML suspension 15 g, 15 g, Oral, Once  •  glucose (DEX4) oral liquid 15 g, 15 g, Oral, Q15 Min PRN **OR** Component Value Date    CREATSERUM 1.76 11/26/2018    BUN 34 11/26/2018     11/26/2018    K 4.3 11/26/2018     11/26/2018    CO2 19.0 11/26/2018    GLU 64 11/26/2018    CA 8.3 11/26/2018    PGLU 106 11/26/2018       STUDIES:[RH.1]    [RH.3]

## (undated) NOTE — IP AVS SNAPSHOT
Patient Demographics     Address  [address] [place] [address] Phone  [phone] [facility])  [phone] (Mobile) *Preferred*      Emergency Contact(s)     Name Relation Home Work [address] [facility] [employer] [address] [phone]    Unique Gonzales Next dose due:  TONIGHT      Take 100 mg by mouth 2 (two) times daily. DULCOLAX 10 MG Supp  Generic drug:  bisacodyl      Place 10 mg rectally daily as needed.           ferrous sulfate 325 (65 FE) MG Tbec  Next dose due:  TONIGHT      Take 325 mg Commonly known as:  RISPERDAL  Next dose due:  TONIGHT      Take 0.5 mg by mouth 2 (two) times daily. SEEBRI NEOHALER 15.6 MCG Caps  Generic drug:  Glycopyrrolate  Next dose due:  TONIGHT      Inhale 1 capsule into the lungs 2 (two) times daily. [id number] Miconazole Nitrate 2 % powder 12/09/19 2154 Given      [id number] Miconazole Nitrate 2 % powder 12/10/19 0920 Given      [id number] Umeclidinium Bromide (INCRUSE ELLIPTA) 62.5 MCG/INH inhaler 1 puff 12/10/19 0921 Given      [id number] amLODIPine Be [id number] Insulin Aspart Pen (NOVOLOG) 100 UNIT/ML flexpen 1-10 Units 12/09/19 2157 Given      [id number] Insulin Aspart Pen (NOVOLOG) 100 UNIT/ML flexpen 1-10 Units 12/10/19 0920 Given      [id number] Insulin Aspart Pen (NOVOLOG) 100 UNIT/ML flexpen 1-10 U GFR, -American 10 >=60  Orpha Ruffini            MAGNESIUM [[id number]] (Normal)  Resulted: 12/10/19 0653, Result status: Final result   Ordering provider:  Efren Saldivar MD  12/09/19 8780 Resulting lab:  [facility]    Specimen Information    Type Sour Acinetobacter baumannii complex     Not Specified    Cefepime >=64  Resistant [1]     Ceftazidime >16  Resistant    Ciprofloxacin >=4  Resistant    Gentamicin <=1  Sensitive    Levofloxacin >=8  Resistant    Meropenem >8  Resistant    Piperacillin + Tazo no complaints at this time and is hemodynamically stable. Her creatinine is elevated but not far from her previous admission. It appears she is approaching end-stage renal disease and is not a candidate for hemodialysis.   ER, patient with a chest x-ray w If blood glucose is 251-300: give 4 units  If blood glucose is 301-350: give 6 units  If blood glucose is 351-400: give 8 units  Call provider if BG >400 or <60, Disp: , Rfl:   Umeclidinium Bromide 62.5 MCG/INH Inhalation Aerosol Powder, Breath Activated, 2 (two) times daily. , Disp: 60 tablet, Rfl: 11  risperiDONE 0.5 MG Oral Tab, Take 0.5 mg by mouth 2 (two) times daily. , Disp: , Rfl:   AmLODIPine Besylate 5 MG Oral Tab, Take 5 mg by mouth daily.   , Disp: , Rfl:   baclofen 20 MG Oral Tab, Take 1 tablet (20 gallops. Equal pulses. Chest and Back: No tenderness or deformity. Abdomen: Soft, nontender, nondistended. Positive bowel sounds. No rebound, guarding or organomegaly. Neurologic: No focal neurological deficits. CNII-XII grossly intact.   Musculoskelet Attribution Garcia    DK. 1 - Ricardo Sewell MD on 12/4/2019 12:22 AM                        Consults - MD Consult Notes      Consults signed by Jessika Castro DO at 12/8/2019  6:02 PM     Author:  Jessika Castro DO Service:  Urology Author Type:  P lack of coordination w/ abnormal posture    • UTI (urinary tract infection)      Past Surgical History:   Procedure Laterality Date   • BACK SURGERY      c5-c7 herniated disc   • HC INCISION AND DRAINAGE PERIRECTAL ABSCESS       Family History   Problem R •  docusate sodium (COLACE) cap 100 mg, 100 mg, Oral, BID  •  ferrous sulfate EC tab 325 mg, 325 mg, Oral, BID with meals  •  Fludrocortisone Acetate (FLORINEF) tab 0.1 mg, 0.1 mg, Oral, Daily  •  Fluticasone Furoate-Vilanterol (BREO ELLIPTA) 200-25 MCG/IN Abdomen: soft, obese, edematous, ND, no masses, no rebound, no guarding, no rigidity  : No CVAT.      Laboratory Data:[CJ.1]  Lab Results   Component Value Date    CREATSERUM 4.81 12/08/2019    BUN 66 12/08/2019     12/08/2019    K 4.3 12/08/2019 CKD (chronic kidney disease) stage 5, GFR less than 15 ml/min [facility])     Palliative care encounter     Counseling regarding advance care planning and goals of care     Acute renal failure (ARF) ([facility] [address]     Acute kidney injury ([facility] [address]     Chronic renal failur Location [facility] [address] Attending Ace Blackburn MD   [address] Day # 7 PCP Adair Mendenhall MD     Date of Admission: 12/3/2019    Date of Discharge: 12/10/2019    Admitting Diagnosis: Hypernatremia [E87.0]  Acute cystitis without hematuria [N30.00]  Ac CKD (chronic kidney disease) stage 5, GFR less than 15 ml/min [facility])     Palliative care encounter     Counseling regarding advance care planning and goals of care     Acute renal failure (ARF) ([facility] [address]     Acute kidney injury ([facility] [address]     Chronic renal failur OR HEAD ([id number]), 10/29/2019, 10:56. INDICATIONS:  new onset dysphagia, rule out cva  TECHNIQUE:  Noncontrast CT scanning is performed through the brain. Dose reduction techniques were used.  Dose information is transmitted to the [facility] ([address] [place], [place] [address]vd time, grey scale, and duplex ultrasound was used to evaluate the lower extremity venous system. B-mode two-dimensional images of the vascular structures, Doppler spectral analysis, and color flow. Doppler imaging were performed.   The following veins were pt seen and examined earlier. Awake and seemed comfortable. No fevers.  No new issues     OBJECTIVE:     Intake/Output:     Intake/Output Summary (Last 24 hours) at 12/10/2019 1107  Last data filed at 12/9/2019 2355      Gross per 24 hour   Intake 820 ml which includes the Dose Index   Registry.     PATIENT STATED HISTORY: (As transcribed by Technologist)  New obset dysphagia.  Rule out CVA.     FINDINGS:  No evidence of hemorrhage or extra-axial fluid collection.     Lucencies in the deep periventricular w escitalopram (LEXAPRO) tablet 10 mg, 10 mg, Oral, Daily  docusate sodium (COLACE) cap 100 mg, 100 mg, Oral, BID  ferrous sulfate EC tab 325 mg, 325 mg, Oral, BID with meals  Fludrocortisone Acetate (FLORINEF) tab 0.1 mg, 0.1 mg, Oral, Daily  Fluticasone Fu 1. Bilateral pedal edema, acute kidney injury on chronic kidney disease stage V  1. Nephrology on consult  2. Progressive kidney failure due to diabetes mellitus and hypertension  3. IV diuretics per nephrology  2. Anemia of chronic kidney disease  1.  Meghan Soares Umeclidinium Bromide 62.5 MCG/INH Inhalation Aerosol Powder, Breath Activated  Inhale 1 puff into the lungs daily. Cholestyramine 4 g Oral Powd Pack  Take 4 g by mouth. traZODone HCl 50 MG Oral Tab  Take 50 mg by mouth nightly.     insulin glargine 10 Take 100 mg by mouth 2 (two) times daily. bisacodyl (DULCOLAX) 10 MG Rectal Suppos  Place 10 mg rectally daily as needed. Labetalol HCl 200 MG Oral Tab  Take 200 mg by mouth 2 (two) times daily.     diphenhydrAMINE HCl 25 MG Oral Tab  Take 25 mg by Wt:  (201lb) HR: 69bpm Loc:  [facility]        Gndr: F          BSA: 1.98m^2 Sonographer: Aisha Thornton Ordering:    Ayesha Howard Consulting:  Dc Barragan Referring:   Tina Diaz ---------------------------------------------------------------- - * Left ventricle: The cavity size was normal. Wall thickness was mildly increased. Systolic function was vigorous. The estimated ejection fraction was 65-70%.  Doppler parameters are consistent with abnormal left ventricular relaxation - grade 1 diastoli - Measurements  Left ventricle                          Value          11/26/2018 Reference  LV ID, ED, PLAX                         5.2   cm       5.1        3.9 - 5.3  LV ID, ES, PLAX                         3.2   cm       2.8        ---------  LV PW thi S              2.8   cm       ---------- ---------   Left atrium                             Value          11/26/2018 Reference  LA ID, A-P, ES                     (H)  5.4   cm       ---------- 2.7 - 3.8  LA ID/bsa, A-P                     (H)  2.7   cm/ No notes of this type exist for this encounter.         SLP Note - SLP Notes      SLP Note signed by Ben John at 12/10/2019 10:12 AM  Version 1 of 1    Author:  Ben John Service:  Rehab Author Type:  Speech and Language Pathologist    Filed: implementation of aspiration precautions and swallow strategies independently over 2-3 session(s).     Met   Goal #3 The patient will tolerate trial upgrade of regular consistency and thin liquids without overt signs or symptoms of aspiration with 100 % ac

## (undated) NOTE — IP AVS SNAPSHOT
[address]            (For Outpatient Use Only) Initial Admit Date: 6/15/2019   Inpt/Obs Admit Date: Inpt: 6/15/19 / Obs: N/A   Discharge Date:    Lorin Proper:  [de-identified]   MRN: [de-identified]   CSN: [id number]   CEID: [id number] Subscriber ID:  Pt Rel to Subscriber:    Hospital Account Financial Class: Medicare    June 19, 2019

## (undated) NOTE — IP AVS SNAPSHOT
Patient Demographics     Address  [address] [facility] [address] Phone  [phone] [facility])  [phone] (Mobile) *Preferred*      Emergency Contact(s)     Name Relation Home Work [address] [place] [address]     [address] [place] [address] diphenhydrAMINE HCl 25 MG Caps  Commonly known as:  BENADRYL      Take 25 mg by mouth every 6 (six) hours as needed for Itching. docusate sodium 100 MG Caps  Commonly known as:  COLACE      Take 100 mg by mouth 2 (two) times daily. TAB-A-RAHEEM Tabs      Take 1 tablet by mouth daily.                 Where to Get Your Medications      These medications were sent to [facility] ([facility]) [address], Chandana Rosado, [address] [place] [address] Order ID Medication Name Action Time Action Reason Comments    [id number] Heparin Sodium (Porcine) 5000 UNIT/ML injection 5,000 Units 06/03/19 2017 Given            LEFT UPPER ABDOMEN     Order ID Medication Name Action Time Action Reason Comments    [id number] Calcium, Total 8.7 8.5 - 10.1 mg/dL [facility]   Calculated Osmolality 324 275 - 295 mOsm/kg [facility]   GFR, Non- 15 >=60  [facility]   GFR, -American 17 >=60  [facility]            CBC WITH DIFFERENTIAL WITH PLATELET [[id number] Trimethoprim/Sulfa <=20  Sensitive                        H&P - H&P Note      H&P signed by Anatoliy Sanchez MD at 6/2/2019 10:06 PM  Version 1 of 1    Author:  Anatoliy Sanchez MD Service:  — Author Type:  Physician    Filed:  6/2/2019 10:06 PM Date of Ser Past Surgical History:   Procedure Laterality Date   • BACK SURGERY      c5-c7 herniated disc   • HC INCISION AND DRAINAGE PERIRECTAL ABSCESS         Social History:  reports that she has been smoking.   She has never used smokeless tobacco. She reports ruth daily. Disp: 60 tablet Rfl: 0   AmLODIPine Besylate 5 MG Oral Tab Take 5 mg by mouth daily. Disp:  Rfl:    baclofen 20 MG Oral Tab Take 1 tablet (20 mg total) by mouth 3 (three) times daily.  Disp: 90 tablet Rfl: 0   calcium acetate 667 MG Oral Cap Take 1 Integument:[FA.1] severe candiasisi[FA. 2]  Psychiatric: Appropriate mood and affect.       Diagnostic Data:      Labs:  Recent Labs   Lab 06/02/19  1730   WBC 11.7*   HGB 9.4*   MCV 92.9   .0       Recent Labs   Lab 06/02/19  1730   GLU 63*   BUN 65* Author:  Heidi Novak MD Service:  Neurology Author Type:  Physician    Filed:  6/3/2019  5:14 PM Date of Service:  6/3/2019  3:36 PM Status:  Signed    :  Heidi Novak MD (Physician)     Consult Orders    1.  IP Consult to Neurology Once [27 • HC INCISION AND DRAINAGE PERIRECTAL ABSCESS         FAMILY HISTORY:  family history is not on file. SOCIAL HISTORY:   reports that she has been smoking. She has never used smokeless tobacco. She reports that she does not drink alcohol or use drugs. Neck: supple, full range of motion; no carotid bruits[KB. 2]      NEUROLOGICAL:   Limited exam due to mental status. [KB.1]    Mental status: awake, alert, eyes open;[KB.2] Patient[KB. 1] verbal and able to state name but does not give age - is able to respon EEG done and most consistent with encephalopathy - no seizures noted     Plan:[KB.2]  Seizure precautions  Continue Keppra 1g daily, consider dosing at night as patient had issues with lethargy in the past (Keppra was decreased to 250mg qam and 500mg qhs d

## (undated) NOTE — IP AVS SNAPSHOT
Patient Demographics     Address  [address] [place] [address] Phone  [phone] [facility])  [phone] (Mobile) *Preferred*      Emergency Contact(s)     Name Relation Home Work [address] [phone]    Cj Membreno Commonly known as:  COLACE  Next dose due: Tonight, 9 pm      Take 100 mg by mouth 2 (two) times daily. DULCOLAX 10 MG Supp  Generic drug:  bisacodyl      Place 10 mg rectally daily as needed.           epoetin keeley-epbx [id number] UNIT/ML Soln  Common Apply topically every third day. To scalp for seborrheic dermatitis          Labetalol HCl 200 MG Tabs  Commonly known as:  King Bridget  Next dose due: Tonight, 9 pm      Take 200 mg by mouth 2 (two) times daily.           levETIRAcetam 500 MG Tabs  Commonl Next dose due: Tomorrow, 12/21      Inhale 1 puff into the lungs daily. vancomycin HCl 50 MG/ML Solr  Commonly known as:  FIRVANQ  Next dose due: Tonight, 8 pm      Take 2.5 mL (125 mg total) by mouth every 6 (six) hours for 10 days.   Stop takin [id number] calcium acetate (PHOSLO) cap 667 mg 12/20/19 0834 Given      [id number] cholestyramine light (PREVALITE) powder packet 4 g 12/20/19 0833 Given      [id number] diphenhydrAMINE (BENADRYL) cap/tab 25 mg 12/20/19 0543 Given      [id number] docusate sod [id number] sodium bicarbonate tab 1,300 mg 12/20/19 1615 Given      [id number] traZODone HCl (DESYREL) tab 50 mg 12/19/19 2045 Given      [id number] vancomycin HCl [facility]) 50 MG/ML oral solution 125 mg 12/19/19 2045 Given      [id number] vancomycin HCl (FIR CBC WITH DIFFERENTIAL WITH PLATELET [[id number]]  Resulted: 12/20/19 0825, Result status: Final result   Ordering provider:  Steve Nicole MD  12/19/19 8424 Resulting lab:  [facility]   Narrative:   The following orders were created for panel order [facility]    Specimen Information    Type Source Collected On   Blood — 12/20/19 0702          Components    Component Value Reference Range Flag Lab   Glucose 165 70 - 99 mg/dL [facility]   Sodium 139 136 - 145 mmol/L — [facility]   Potassium 4.3 3.5 - Order Status:  Completed Lab Status:  Final result Updated:  12/11/19 1815    Specimen:  Stool      C.  Difficile Toxin B Gene Positive         H&P - H&P Note      H&P signed by Linda Al MD at 12/11/2019 11:47 AM  Version 1 of 1    Author:  Manny Desai • Depression    • Diabetes ([facility] [address]    • Encephalopathy    • Essential hypertension    • Hyperkalemia    • Renal disorder    • Seborrheic dermatitis of scalp    • Seizure disorder (HCC)     unspecified convusions    • Unspecified behavioral syndromes associat folic acid 1 MG Oral Tab, Take 1 mg by mouth daily. ferrous sulfate 325 (65 FE) MG Oral Tab EC, Take 325 mg by mouth 2 (two) times daily with meals.     Glycopyrrolate (SEEBRI NEOHALER) 15.6 MCG Inhalation Cap, Inhale 1 capsule into the lungs 2 (two) ely Blood pressure 126/42, pulse 60, temperature 99 °F (37.2 °C), temperature source Temporal, resp. rate 14, height 165.1 cm (5' 5\"), weight 204 lb 9.4 oz (92.8 kg), SpO2 99 %, not currently breastfeeding.        OBJECTIVE:  Temp:  [99 °F (37.2 °C)] 99 °F (37 represent atelectasis/scarring or developing infiltrates. Clinical correlation recommended.      Dictated by: Kaykay Palomares MD on 12/11/2019 at 7:25     Approved by: Kaykay Palomares MD on 12/11/2019 at 7:26          Ekg 12-lead    Result Date: 12/11/2019 TECHNIQUE:  Transabdominal gray scale ultrasound imaging of the bladder was performed. Routine technique was utilized.    PATIENT STATED HISTORY: (As transcribed by Technologist)     MEASUREMENTS:              Prevoid Bladder Volume:  414 cc   FINDINGS: the right diaphragm. Cardiomegaly with normal pulmonary vascularity. Patchy opacity in the mid and lower right lung could represent atelectasis/scarring or developing infiltrates. Clinical correlation recommended.   No pneumothorax or significant pleural AHSAN (acute kidney injury) ([facility]     History of seizure     Cognitive impairment     Acute delirium     Severe episode of recurrent major depressive disorder, without psychotic features ([facility]     UTI (urinary tract infection), bacterial     Visual halluc 3. IV diuretics per nephrology  2. Anemia of chronic kidney disease  1. Follow CBC  2. Procrit per nephrology   3. Iron studies  3. Diabetes mellitus type 2 with CKD  1. Hyperglycemia protocol  2. Follow Accu-Cheks  4. Seizure disorder  1.  IV Keppra while HPI:[YZ.1] Donna Bourne[YZ. 3 is a  60year old female, with an extensive PMH including COPD, hx of spinal cord injury, DM, HTN, CKD (not a candidate for hemodialysis), wheel-chair bound at baseline, and epilepsy, who we have seen in the past for ep cholestyramine light (PREVALITE) powder packet 4 g, 4 g, Oral, Daily  traZODone HCl (DESYREL) tab 50 mg, 50 mg, Oral, Nightly  torsemide (DEMADEX) tab 40 mg, 40 mg, Oral, Daily  Insulin Aspart Pen (NOVOLOG) 100 UNIT/ML flexpen 1-5 Units, 1-5 Units, [place] Procedure Laterality Date   • BACK SURGERY      c5-c7 herniated disc   • HC INCISION AND DRAINAGE PERIRECTAL ABSCESS[YZ.2]       Social History:[YZ.1]  Social History    Tobacco Use      Smoking status: Former Smoker        Packs/day: 1.00        Types: Ci CA 8.0* 7.9* 8.5   ALB  --  2.1*  --     141 148*   K 4.8 5.0 4.6   * 115* 122*   CO2 17.0* 18.0* 16.0*   ALKPHO  --  93  --    AST  --  16  --    ALT  --  26  --    BILT  --  0.2  --    TP  --  5.6*  --[YZ. 2]           Assessment:  Toxic metab Hyperkalemia     Encephalopathy in sepsis     Sepsis (HCC)     Diabetes (HCC)     CKD (chronic kidney disease) stage 3, GFR 30-59 ml/min (HCC)     AHSAN (acute [facility] injury) ([facility]     History of seizure     Cognitive impairment     Acute delirium     Sev PROCEDURE:  CT BRAIN OR HEAD (68517)  COMPARISON:  [facility] , CT, CT BRAIN OR HEAD (02453), 12/04/2019, 13:15. INDICATIONS:  ams  TECHNIQUE:  Noncontrast CT scanning is performed through the brain. Dose reduction techniques were used.  Dose information is tr are appropriate for the patient's age. No mass effect. Visualized portions of paranasal sinuses are unremarkable. Visualized portions of mastoid air cells are unremarkable. Visualized portions of orbits are unremarkable.   IMPRESSION: Unremarkable CT he CONCLUSION:  No DVT. Subcutaneous edema left arm.     Dictated by: Fabiano Olivas MD on 12/18/2019 at 12:00     Approved by: Fabiano Olivas MD on 12/18/2019 at 12:00          Us Venous Doppler Leg Bilat - Diag Img (cpt=93970)    Result Date: 12/3/2 speech pathologist. Multiple consistencies of barium were administered including nectar by spoon and cup, honey by spoon and cup, pudding by spoon, and cracker with barium. There is both laryngeal penetration and aspiration with nectar by cup.   PLEASE SEE lobe pleural parenchymal opacity without new focal consolidation. No pneumothorax. Osseous structures are stable. CONCLUSION:  1.  Slight interval decrease in size of left lower lobe pleural parenchymal opacity when compared to 12/16/2019 chest radio HISTORY: (As transcribed by Technologist)  Patient offered no additional history at this time.     FINDINGS:  There are patchy airspace opacities within the left lung diffusely as well as within the right middle lobe/lower lobe region which appear new or in PROCEDURE:  XR CHEST AP PORTABLE  (CPT=71045)  TECHNIQUE:  AP chest radiograph was obtained. COMPARISON:  [facility] , XR, XR CHEST AP PORTABLE  (CPT=71045), 12/03/2019, 15:48.   INDICATIONS:  AMS  PATIENT STATED HISTORY: (As transcribed by Technologist)  The Collection Time: 12/11/19  9:11 AM   Result Value Ref Range    Blood Culture Result No Growth 5 Days N/A     Reason for Admission: see below    Physical Exam: see below    Hospital Course: see below  SUBJECTIVE:  Subjective:  Елена Peters is a(n) 6 7037 12/18/19  1220 12/18/19  1615 12/18/19  2210 12/19/19  0748   PGLU 251* 306* 278* 229* 157*         No results for input(s): URINE, CULTI, BLDSMR in the last 168 hours.     Recent Results         Hospital Encounter on 12/11/19   1.  BLOOD CULTURE     S (As transcribed by Technologist)     MEASUREMENTS:              Prevoid Bladder Volume:  414 cc   FINDINGS:   BLADDER:  Normal.        CONCLUSION:  Normal appearing bladder. Prevoid volume 414 cc.     Dictated by: Catie Li MD on 12/08/2019 at 9:5 There is stable mild cardiomegaly with slight vascular redistribution without interstitial edema.   Worsening mixed interstitial alveolar infiltrate the lower 1/2 of the right lung with some new patchy infiltrate in the left lung base laterally and right fay cardiomegaly, similar to the previous study with suggestion of mild pulmonary vascular congestion. Mild pulmonary edema can also not be excluded with mild diffuse pulmonary vascular prominence. Stable mild elevation of the right hemidiaphragm.   Associate Toxic metabolic encephalopathy     Hyperglycemia     Sepsis due to urinary tract infection (HCC)     Hyperkalemia     Encephalopathy in sepsis     Sepsis (HCC)     Diabetes (HCC)     CKD (chronic kidney disease) stage 3, GFR 30-59 ml/min (HCC)     AHSAN ( Stage 4 chronic kidney disease (HCC)     Hypervolemia     Acidosis     Principal Problem:    Nosocomial pneumonia  Active Problems:    Altered mental status, unspecified altered mental status type    Anemia in stage 5 chronic kidney disease, not on  Per sw-   At this time [facility]. If it is needed at dc a hospice order will need to be placed.  Confirm dc plan w/state guardian prior to dc.        See tests ordered,  Available and radiology reviewed  All consultant notes revie triamcinolone acetonide 0.1 % External Cream  Apply topically daily. insulin detemir 100 UNIT/ML Subcutaneous Solution Pen-injector  Inject 5 Units into the skin nightly. Qty: 3 pen Refills: 0    folic acid 1 MG Oral Tab  Take 1 mg by mouth daily. bisacodyl (DULCOLAX) 10 MG Rectal Suppos  Place 10 mg rectally daily as needed. Follow up Visits: Follow-up with all MD's as per their recommendation/ refer to chart for details. 3 days    Lennox Ahr  12/19/2019  10:24 AM[SK.1]    Electronical echocardiography for ventricular function evaluation and assessment of valvular function. Image quality was good. The study was technically limited due to poor patient compliance.  ---------------------------------------------------------------------------- Aortic valve:   Trileaflet; mildly to moderately calcified leaflets. Doppler:  Transvalvular velocity was minimally increased. There was no stenosis. No significant regurgitation. Valve area (VTI): 2.46cm^2. Indexed valve area (VTI): 1.24cm^2/m^2.  Valv 18             16         ---------   LVOT                                    Value          11/26/2018 Reference  LVOT area                               3.46  cm^2     ---------- ---------  LVOT ID                                 2.1   cm       --------- volume/bsa, ES, 1-p A2C              49    ml/m^2   61         ---------   Mitral valve                            Value          11/26/2018 Reference  Mitral E-wave peak velocity             1.33  m/sec    1.23       ---------  Mitral A-wave peak velocity SLP Follow-up Date: 12/18/19  Compensatory Strategies Recommended: No straws; Liquids via spoon; Slow rate; Alternate consistencies;Small bites and sips;Multiple swallows(1:1 feed assist, fully upright and alert for all po)  Aspiration Precautions: Upright po Approved by: Aidan Agosto MD on 12/16/2019 at 13:41       Reason for Referral: RN dysphagia screen; Altered diet consistency  Assess for safety to take po    Family/Patient Goals:  Patient requesting food and drink     ASSESSMENT   DYSPHAGIA ASSESSMENT  Test Strategy(ies) Implemented (Honey Thick Liquids): No straws; Liquids via spoon; Slow rate;Small bites and sips;Multiple swallows  Effectiveness: Yes  PUREE  Oral Phase of Swallow (VFSS - Puree): Impaired  Bolus Retrieval (VFSS - Puree):  Intact  Bilabial Seal Given her fluctuating mental status, recommend proceeding cautiously with mechanical soft chopped diet consistency and honey thick liquids by tsp only and observing strict aspiration precautions including 1:1 feed and patient to be fully upright and alert Filed:  12/19/2019  1:19 PM Date of Service:  12/19/2019  1:16 PM Status:  Signed    :  Janee Sparks (Speech and Language Pathologist)       SPEECH DAILY NOTE - INPATIENT    ASSESSMENT & PLAN   ASSESSMENT  Pt seen for dysphagia tx to assess tole by  VFSS including Slow rate, Small bites, Small sips, Multiple swallows, Alternate liquids/solids, No straws, Upright 90 degrees, Liquids via tsp amount only, Eliminate distractions, Feed patient with max assistance 100 % of the time across 2 sessions.  M - positioning, O2, oral care, hygiene.    - PRN BP MEDS AS ORDERED  - See additional Care Plan goals for specific interventions